# Patient Record
Sex: FEMALE | Race: WHITE | NOT HISPANIC OR LATINO | Employment: FULL TIME | URBAN - METROPOLITAN AREA
[De-identification: names, ages, dates, MRNs, and addresses within clinical notes are randomized per-mention and may not be internally consistent; named-entity substitution may affect disease eponyms.]

---

## 2018-09-28 ENCOUNTER — HOSPITAL ENCOUNTER (EMERGENCY)
Facility: HOSPITAL | Age: 63
Discharge: HOME | End: 2018-09-28
Attending: FAMILY MEDICINE
Payer: OTHER MISCELLANEOUS

## 2018-09-28 VITALS
OXYGEN SATURATION: 98 % | HEIGHT: 65 IN | HEART RATE: 90 BPM | BODY MASS INDEX: 31.32 KG/M2 | TEMPERATURE: 98 F | SYSTOLIC BLOOD PRESSURE: 158 MMHG | WEIGHT: 188 LBS | RESPIRATION RATE: 20 BRPM | DIASTOLIC BLOOD PRESSURE: 88 MMHG
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
## 2018-09-28 PROCEDURE — 70450 CT HEAD/BRAIN W/O DYE: CPT | Mod: TC

## 2018-09-28 PROCEDURE — 70450 CT HEAD/BRAIN W/O DYE: CPT | Mod: 26,,, | Performed by: RADIOLOGY

## 2018-09-28 PROCEDURE — 73590 X-RAY EXAM OF LOWER LEG: CPT | Mod: 26,LT,, | Performed by: RADIOLOGY

## 2018-09-28 PROCEDURE — 70486 CT MAXILLOFACIAL W/O DYE: CPT | Mod: TC

## 2018-09-28 PROCEDURE — 73590 X-RAY EXAM OF LOWER LEG: CPT | Mod: TC,FY,LT

## 2018-09-28 PROCEDURE — 99284 EMERGENCY DEPT VISIT MOD MDM: CPT | Mod: 25

## 2018-09-28 PROCEDURE — 70486 CT MAXILLOFACIAL W/O DYE: CPT | Mod: 26,,, | Performed by: RADIOLOGY

## 2018-09-28 RX ORDER — AMLODIPINE BESYLATE 5 MG/1
5 TABLET ORAL DAILY
COMMUNITY
End: 2020-10-23 | Stop reason: SDUPTHER

## 2018-09-28 RX ORDER — LOSARTAN POTASSIUM 50 MG/1
50 TABLET ORAL DAILY
COMMUNITY
End: 2020-10-23 | Stop reason: SDUPTHER

## 2018-09-28 RX ORDER — ATORVASTATIN CALCIUM 40 MG/1
40 TABLET, FILM COATED ORAL DAILY
COMMUNITY
End: 2020-10-23 | Stop reason: SDUPTHER

## 2018-09-29 NOTE — DISCHARGE INSTRUCTIONS
Home to rest, elevate extremities.  Return to ER if condition changes or worsens.  Ibuprofen as needed for pain or inflammation.  May use ice to sore areas for the 1st 24 hr then switch to heat as needed.  Follow up with the primary care provider in 2-3 days if no improvement in symptoms.

## 2018-09-29 NOTE — ED PROVIDER NOTES
Encounter Date: 2018       History     Chief Complaint   Patient presents with    Fall     fell at hotel in [facility] this AM. Both knees hurts and face     Pt c/o bilateral knee pain and facial pain after falling from a standing position.  Pt denies LOC          Review of patient's allergies indicates:  No Known Allergies  Past Medical History:   Diagnosis Date    Hyperlipidemia     Hypertension      Past Surgical History:   Procedure Laterality Date     SECTION       History reviewed. No pertinent family history.  Social History     Tobacco Use    Smoking status: Never Smoker   Substance Use Topics    Alcohol use: Yes     Comment: occ    Drug use: No     Review of Systems   Musculoskeletal:        Bilateral knee pain, facial pain   All other systems reviewed and are negative.      Physical Exam     Initial Vitals [18]   BP Pulse Resp Temp SpO2   (!) 158/88 90 20 98.3 °F (36.8 °C) 98 %      MAP       --         Physical Exam    Nursing note and vitals reviewed.  Constitutional: She appears well-developed and well-nourished.   HENT:   Head: Normocephalic.   Eyes: Conjunctivae and EOM are normal. Pupils are equal, round, and reactive to light.   Neck: Normal range of motion. Neck supple.   Cardiovascular: Normal rate.   Pulmonary/Chest: Breath sounds normal.   Abdominal: Soft. Bowel sounds are normal.   Musculoskeletal: Normal range of motion. She exhibits tenderness.   Tenderness noted to bilateral knees, chin and nose.  No echymosis or swelling noted on exam   Neurological: She is alert. She has normal strength and normal reflexes.   Skin: Skin is warm and dry. Capillary refill takes 2 to 3 seconds.   Small abrasion noted below the left knee   Psychiatric: She has a normal mood and affect. Her behavior is normal. Judgment and thought content normal.         ED Course   Procedures  Labs Reviewed - No data to display       Imaging Results          CT Head Without Contrast (In  process)                X-Ray Tibia Fibula 2 View Left (In process)                                    ED Course as of Sep 28 2127   Fri Sep 28, 2018   2121 CT head and facial bones normal read per radiology.   [MD]   2125 Pt declines need for pain meds. Results d/w her and significant other. All questions answered.   [MD]      ED Course User Index  [MD] Catia Fritz MD     Clinical Impression:   The encounter diagnosis was Fall.                             Catia Fritz MD  09/28/18 2127

## 2019-01-08 DIAGNOSIS — M25.561 PAIN IN BOTH KNEES, UNSPECIFIED CHRONICITY: Primary | ICD-10-CM

## 2019-01-08 DIAGNOSIS — M25.562 PAIN IN BOTH KNEES, UNSPECIFIED CHRONICITY: Primary | ICD-10-CM

## 2019-01-11 ENCOUNTER — HOSPITAL ENCOUNTER (OUTPATIENT)
Dept: RADIOLOGY | Facility: HOSPITAL | Age: 64
Discharge: HOME | End: 2019-01-11
Attending: ORTHOPAEDIC SURGERY
Payer: OTHER MISCELLANEOUS

## 2019-01-11 ENCOUNTER — OFFICE VISIT (OUTPATIENT)
Dept: ORTHOPEDICS | Facility: CLINIC | Age: 64
End: 2019-01-11
Payer: OTHER MISCELLANEOUS

## 2019-01-11 VITALS
WEIGHT: 188.06 LBS | HEART RATE: 89 BPM | SYSTOLIC BLOOD PRESSURE: 126 MMHG | DIASTOLIC BLOOD PRESSURE: 83 MMHG | HEIGHT: 65 IN | BODY MASS INDEX: 31.33 KG/M2

## 2019-01-11 DIAGNOSIS — M70.51 PES ANSERINUS BURSITIS OF BOTH KNEES: ICD-10-CM

## 2019-01-11 DIAGNOSIS — M22.2X2 PATELLOFEMORAL PAIN SYNDROME OF BOTH KNEES: ICD-10-CM

## 2019-01-11 DIAGNOSIS — M70.52 PES ANSERINUS BURSITIS OF BOTH KNEES: ICD-10-CM

## 2019-01-11 DIAGNOSIS — M25.561 PAIN IN BOTH KNEES, UNSPECIFIED CHRONICITY: ICD-10-CM

## 2019-01-11 DIAGNOSIS — M71.21 BAKER CYST, RIGHT: ICD-10-CM

## 2019-01-11 DIAGNOSIS — M22.2X1 PATELLOFEMORAL PAIN SYNDROME OF BOTH KNEES: ICD-10-CM

## 2019-01-11 DIAGNOSIS — M25.562 PAIN IN BOTH KNEES, UNSPECIFIED CHRONICITY: ICD-10-CM

## 2019-01-11 DIAGNOSIS — S83.242A ACUTE MEDIAL MENISCUS TEAR, LEFT, INITIAL ENCOUNTER: ICD-10-CM

## 2019-01-11 DIAGNOSIS — S83.419A SPRAIN OF MEDIAL COLLATERAL LIGAMENT OF KNEE, UNSPECIFIED LATERALITY, INITIAL ENCOUNTER: ICD-10-CM

## 2019-01-11 PROCEDURE — 73562 X-RAY EXAM OF KNEE 3: CPT | Mod: 26,50,, | Performed by: RADIOLOGY

## 2019-01-11 PROCEDURE — 73562 XR KNEE 3 VIEW BILATERAL: ICD-10-PCS | Mod: 26,50,, | Performed by: RADIOLOGY

## 2019-01-11 PROCEDURE — 99999 PR PBB SHADOW E&M-EST. PATIENT-LVL III: CPT | Mod: PBBFAC,,, | Performed by: ORTHOPAEDIC SURGERY

## 2019-01-11 PROCEDURE — 73562 X-RAY EXAM OF KNEE 3: CPT | Mod: 50,TC,PN

## 2019-01-11 PROCEDURE — 99999 PR PBB SHADOW E&M-EST. PATIENT-LVL III: ICD-10-PCS | Mod: PBBFAC,,, | Performed by: ORTHOPAEDIC SURGERY

## 2019-01-11 PROCEDURE — 99204 PR OFFICE/OUTPT VISIT, NEW, LEVL IV, 45-59 MIN: ICD-10-PCS | Mod: S$GLB,,, | Performed by: ORTHOPAEDIC SURGERY

## 2019-01-11 PROCEDURE — 99204 OFFICE O/P NEW MOD 45 MIN: CPT | Mod: S$GLB,,, | Performed by: ORTHOPAEDIC SURGERY

## 2019-01-11 NOTE — PROGRESS NOTES
Subjective:      Patient ID: Keesha Quezada is a 63 y.o. female.    Chief Complaint: Pain and Injury of the Right Knee and Pain and Injury of the Left Knee    Referring Provider: No address on file    HPI:  Ms. Quezada is a 63-year-old female who presented today for evaluation of over 3 months of bilateral knee pain left greater than right which was incurred after she slipped and fell on a floor in a hotel.  She was originally seen in the emergency room at [facility] on 2018.  Going from a seated to a standing position or standing to a seated position increases her symptoms. Once she is moving her pain improves.  Her left knee feels unstable.  She has swelling and giving way but denied locking.  She has taken NSAIDs with help.  She has not worn a brace, done physical therapy, nor had injections.    Past Medical History:   Diagnosis Date    Hyperlipidemia      *  *  *  *  * Hypertension  Diabetes  Asthma  Seasonal allergies  Depression  GERD      Past Surgical History:   Procedure Laterality Date     *  *  SECTION  TUBAL LIGATION  ORAL IMPLANTS         Review of patient's allergies indicates:  No Known Allergies    Social History     Occupational History    [employer] officer for [employer]     Tobacco Use    Smoking status: Never Smoker    Smokeless tobacco: Never Used   Substance and Sexual Activity    Alcohol use: Yes     Comment: occ    Drug use: No    Sexual activity: Yes      Family History   Problem Relation Age of Onset    ALS Mother     Cancer Father     Heart disease Father     Stroke Sister     No Known Problems Brother     No Known Problems Sister     No Known Problems Brother     No Known Problems Son     No Known Problems Daughter        Previous Hospitalizations:  Childbirth.    ROS:   Review of Systems   Constitution: Negative for chills and fever.   HENT: Negative for congestion.    Eyes: Negative for blurred vision.    Cardiovascular: Negative for chest pain.   Respiratory: Negative for cough.    Endocrine: Negative for polydipsia.   Hematologic/Lymphatic: Does not bruise/bleed easily.   Skin: Negative for itching.   Musculoskeletal: Positive for joint pain. Negative for gout.   Gastrointestinal: Positive for heartburn. Negative for constipation and diarrhea.   Genitourinary: Negative for nocturia.   Neurological: Negative for headaches and seizures.   Psychiatric/Behavioral: Negative for substance abuse.   Allergic/Immunologic: Positive for environmental allergies.         Objective:      Physical Exam:   General: AAOx3.  No acute distress  HEENT: Normocephalic, PEARLA EOMI, Good Dentition  Neck: Supple, No JVD  Chest: Symetric, equal excursion on inspiration  Abdomen: Soft NTND  Vascular:  Pulses intact and equal bilaterally.  Capillary refill less than 3 seconds and equal bilaterally  Neurologic:  Pinprick and soft touch intact and equal bilaterally  Integment:  No ecchymosis, no errythema  Extremity:  Knee:  Extension/flexion equal bilaterally 0/128 degrees. Mild effusion left knee. Baker cyst right knee. Mild crepitus with motion both knees.  Mild patellar load/compression both knees.  Negative patella apprehension/relocation both knees.  Increased excursion with valgus stressing both knees left greater than right.  Valgus stressing equal bilaterally with good endpoint.  Lachman's/drawer equal bilaterally with good endpoint.  Positive joint line tenderness both knees.  Indigo positive left knee. [place] positive both knees.  Tender at the anserine insertion both knees.  Swelling at the anserine insertion both knees.  Radiography:  Personally reviewed x-rays of both knees completed on 01/11/2019 which showed patellofemoral arthritis and a fabella bilaterally no fracture or dislocation.      Assessment:       Impression:      1. Sprain of medial collateral ligament, bilateral knee, initial encounter    2. Traumatic pes  anserinus bursitis of both knees    3. Patellofemoral pain syndrome of both knees    4. Acute medial meniscus tear, left, initial encounter    5. Hoang cyst, right          Plan:       1.  Discussed physical examination and radiographic findings with the patient. Keesha understands that she has traumatic pes anserine bursitis and MCL sprain of both of her knees.  She also appears to have a meniscus tear in her left knee. Since she has not completed any conservative management recommend she trial conservative care and then if she fails conservative care then consider surgical intervention.  2.  Offered a steroid injection to both knees and both anserine insertions she declined stating that she was allergic to cortisone.  3.  Hinged meniscus brace both knees.  One was fitted to each knee.  4.  Home exercises to include quadriceps and hamstring strengthening were shown discussed.  5.  Discussed possible referral to physical therapy will hold off for now if she does not improve may consider referral at next visit.  6.  May return to work with work restrictions to include no lifting/pushing/pulling greater than 10 lb.  No work on ladders or scaffolding.  7.  Voltaren 1% gel, apply to affected area twice daily, dispense 100 g, refill 5.  8.  Mobic 15 mg, 1 p.o. q.day, dispense 30, refill 0.  9.  Follow up in approximately 6 weeks for re-evaluation.  Expect to be able to return the patient to full unrestricted activities at that time.

## 2019-01-11 NOTE — LETTER
January 11, 2019      No Recipients           [facility]  [address] [place] [address]  Phone: [phone]  Fax: [phone]          Patient: Keesha Quezada   MR Number: [id number]   YOB: 1955   Date of Visit: 1/11/2019       Dear :    Thank you for referring Keesha Quezada to me for evaluation. Attached you will find relevant portions of my assessment and plan of care.    If you have questions, please do not hesitate to call me. I look forward to following Keesha Quezada along with you.    Sincerely,    Jayme Lucas, DO    Enclosure  CC:  No Recipients    If you would like to receive this communication electronically, please contact [email or web address] or [phone] to request more information on [facility] Link access.    For providers and/or their staff who would like to refer a patient to [facility], please contact us through our one-stop-shop provider referral line, [facility], at [phone].    If you feel you have received this communication in error or would no longer like to receive these types of communications, please e-mail [email or web address]

## 2019-02-15 ENCOUNTER — OFFICE VISIT (OUTPATIENT)
Dept: ORTHOPEDICS | Facility: CLINIC | Age: 64
End: 2019-02-15
Payer: COMMERCIAL

## 2019-02-15 VITALS — BODY MASS INDEX: 31.33 KG/M2 | HEIGHT: 65 IN | WEIGHT: 188.06 LBS

## 2019-02-15 DIAGNOSIS — M70.52 PES ANSERINUS BURSITIS OF BOTH KNEES: ICD-10-CM

## 2019-02-15 DIAGNOSIS — M22.2X2 PATELLOFEMORAL PAIN SYNDROME OF BOTH KNEES: ICD-10-CM

## 2019-02-15 DIAGNOSIS — M25.562 PATELLOFEMORAL JOINT PAIN, LEFT: ICD-10-CM

## 2019-02-15 DIAGNOSIS — S83.412D SPRAIN OF MEDIAL COLLATERAL LIGAMENT OF LEFT KNEE, SUBSEQUENT ENCOUNTER: Primary | ICD-10-CM

## 2019-02-15 DIAGNOSIS — M22.2X1 PATELLOFEMORAL PAIN SYNDROME OF BOTH KNEES: ICD-10-CM

## 2019-02-15 DIAGNOSIS — S83.419A SPRAIN OF MEDIAL COLLATERAL LIGAMENT OF KNEE, UNSPECIFIED LATERALITY, INITIAL ENCOUNTER: ICD-10-CM

## 2019-02-15 DIAGNOSIS — M71.21 BAKER CYST, RIGHT: ICD-10-CM

## 2019-02-15 DIAGNOSIS — M70.51 PES ANSERINUS BURSITIS OF BOTH KNEES: ICD-10-CM

## 2019-02-15 DIAGNOSIS — M17.11 PRIMARY OSTEOARTHRITIS OF RIGHT KNEE: Primary | ICD-10-CM

## 2019-02-15 DIAGNOSIS — M17.12 PRIMARY OSTEOARTHRITIS OF LEFT KNEE: ICD-10-CM

## 2019-02-15 DIAGNOSIS — M71.21 BAKER'S CYST OF KNEE, RIGHT: ICD-10-CM

## 2019-02-15 DIAGNOSIS — S83.242D ACUTE MEDIAL MENISCUS TEAR, LEFT, SUBSEQUENT ENCOUNTER: ICD-10-CM

## 2019-02-15 DIAGNOSIS — S83.419A SPRAIN OF MEDIAL COLLATERAL LIGAMENT OF KNEE, UNSPECIFIED LATERALITY, INITIAL ENCOUNTER: Primary | ICD-10-CM

## 2019-02-15 PROCEDURE — 99999 PR PBB SHADOW E&M-EST. PATIENT-LVL II: ICD-10-PCS | Mod: PBBFAC,,, | Performed by: ORTHOPAEDIC SURGERY

## 2019-02-15 PROCEDURE — 99999 PR PBB SHADOW E&M-EST. PATIENT-LVL II: CPT | Mod: PBBFAC,,, | Performed by: ORTHOPAEDIC SURGERY

## 2019-02-15 PROCEDURE — 99213 OFFICE O/P EST LOW 20 MIN: CPT | Mod: S$GLB,,, | Performed by: ORTHOPAEDIC SURGERY

## 2019-02-15 PROCEDURE — 99213 PR OFFICE/OUTPT VISIT, EST, LEVL III, 20-29 MIN: ICD-10-PCS | Mod: S$GLB,,, | Performed by: ORTHOPAEDIC SURGERY

## 2019-02-15 NOTE — PROGRESS NOTES
Subjective:      Patient ID: Keesha Quezada is a 63 y.o. female.    Chief Complaint: Pain and Injury of the Left Knee and Pain and Injury of the Right Knee    HPI:  Ms. Quezada returns today to re-evaluate both of her knees.  She stated her right knee is doing well, but her left knee is still painful.  She has been wearing the braces but states that they are somewhat loose and fall down her legs.  The patient was shown how to adjust her braces.  She also stated that she has been driving in her braces and they give her problems when she drives.  She stated that her place of employment requires her to wear then when she is driving.  Several days ago when she was driving for several hours she developed some heel tenderness.    ROS:  No new diagnosis/ surgery/ prescriptions since last office visit on  01/11/2019.  Constitution: Negative for chills and fever.   HENT: Negative for congestion.    Eyes: Negative for blurred vision.   Cardiovascular: Negative for chest pain.   Respiratory: Negative for cough.    Endocrine: Negative for polydipsia.   Hematologic/Lymphatic: Does not bruise/bleed easily.   Skin: Negative for itching.   Musculoskeletal: Positive for joint pain. Negative for gout.   Gastrointestinal: Positive for heartburn. Negative for constipation and diarrhea.   Genitourinary: Negative for nocturia.   Neurological: Negative for headaches and seizures.   Psychiatric/Behavioral: Negative for substance abuse.   Allergic/Immunologic: Positive for environmental allergies.       Objective:      Physical Exam:   General: AAOx3.  No acute distress  Vascular:  Pulses intact and equal bilaterally.  Capillary refill less than 3 seconds and equal bilaterally  Neurologic:  Pinprick and soft touch intact and equal bilaterally  Integment:  No ecchymosis, no errythema  Extremity:   Knee:  Extension/flexion equal bilaterally 0/128 degrees. Mild effusion left knee. Baker cyst right knee. Mild crepitus with motion both knees.   Mild patellar load/compression both knees.  Negative patella apprehension/relocation both knees.  Increased excursion with valgus stressing both knees left greater than right.  Valgus stressing equal bilaterally with good endpoint.  Lachman's/drawer equal bilaterally with good endpoint.  Positive joint line tenderness  Left knee.  Indigo positive left knee. [place] positive left knee,  Tender at the anserine insertion left knee.  Swelling at the anserine insertion left knee.  Radiography:    No new x-rays done today put        Assessment:       Impression:   1. MCL sprain left knee  2.  Resolved MCL sprain right knee.    3. Patellofemoral pain left knee.    4.Medial meniscus tear left knee.  5.  Resolved patellofemoral pain right knee.    6.  Baker cyst right knee.      Plan:       1.  Discussed physical examination with the patient.  She understands it is advantageous that she has resolved her right knee pain, but since her left knee pain has not resolved she needs to trial other modalities.  She understands she should continue with her knee braces but she does not need to wear when she is driving or seated.  Only when she is ambulating or exercises.  Since she cannot have steroid injections she can be referred for physical therapy she also may want to consider viscosupplementation.  Recommend she trial viscosupplementation to her knees since she has an allergy to cortisone.  She should continue her current home exercise program.  2. Voltaren 1% gel, apply to affected area twice daily, dispense 100 g, refill 5.  3.  Mobic 15 mg, 1 p.o. Q.day, dispense 30, refill 5.  4.  Referred to physical therapy.  5.  Continue with home exercises previously shown discussed.  6.  Continue with knee braces when ambulating or exercising, do not need to wear them when driving.  7.  Submit for preauthorization for viscosupplementation to her knees.  8. [facility] portal use was discussed with the patient and information was given.   The patient was encouraged to use the [facility] portal for all future encounters.    9.. Follow up in approximately 6 weeks.  Will get a x-ray of her left heel if she is still symptomatic at her next follow-up.

## 2019-03-07 ENCOUNTER — CLINICAL SUPPORT (OUTPATIENT)
Dept: REHABILITATION | Facility: HOSPITAL | Age: 64
End: 2019-03-07
Attending: ORTHOPAEDIC SURGERY
Payer: COMMERCIAL

## 2019-03-07 DIAGNOSIS — S83.242A ACUTE MEDIAL MENISCUS TEAR, LEFT, INITIAL ENCOUNTER: ICD-10-CM

## 2019-03-07 DIAGNOSIS — M22.2X1 PATELLOFEMORAL PAIN SYNDROME OF BOTH KNEES: Primary | ICD-10-CM

## 2019-03-07 DIAGNOSIS — M22.2X2 PATELLOFEMORAL PAIN SYNDROME OF BOTH KNEES: Primary | ICD-10-CM

## 2019-03-07 PROCEDURE — 97161 PT EVAL LOW COMPLEX 20 MIN: CPT | Mod: PN
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
## 2019-03-25 DIAGNOSIS — M79.672 LEFT FOOT PAIN: Primary | ICD-10-CM

## 2019-03-29 ENCOUNTER — OFFICE VISIT (OUTPATIENT)
Dept: ORTHOPEDICS | Facility: CLINIC | Age: 64
End: 2019-03-29
Payer: COMMERCIAL

## 2019-03-29 ENCOUNTER — HOSPITAL ENCOUNTER (OUTPATIENT)
Dept: RADIOLOGY | Facility: HOSPITAL | Age: 64
Discharge: HOME | End: 2019-03-29
Attending: ORTHOPAEDIC SURGERY
Payer: COMMERCIAL

## 2019-03-29 VITALS — BODY MASS INDEX: 31.33 KG/M2 | WEIGHT: 188.06 LBS | HEIGHT: 65 IN

## 2019-03-29 DIAGNOSIS — M76.62 TENDONITIS, ACHILLES, LEFT: ICD-10-CM

## 2019-03-29 DIAGNOSIS — M23.52 CHRONIC INSTABILITY OF LEFT KNEE: ICD-10-CM

## 2019-03-29 DIAGNOSIS — M71.20 BAKER CYST, UNSPECIFIED LATERALITY: ICD-10-CM

## 2019-03-29 DIAGNOSIS — S83.242D ACUTE TEAR MEDIAL MENISCUS, LEFT, SUBSEQUENT ENCOUNTER: Primary | ICD-10-CM

## 2019-03-29 DIAGNOSIS — M79.672 LEFT FOOT PAIN: ICD-10-CM

## 2019-03-29 DIAGNOSIS — S83.412D SPRAIN OF MEDIAL COLLATERAL LIGAMENT OF LEFT KNEE, SUBSEQUENT ENCOUNTER: ICD-10-CM

## 2019-03-29 DIAGNOSIS — M17.12 PRIMARY OSTEOARTHRITIS OF LEFT KNEE: Primary | ICD-10-CM

## 2019-03-29 PROCEDURE — 73630 X-RAY EXAM OF FOOT: CPT | Mod: 26,LT,, | Performed by: RADIOLOGY

## 2019-03-29 PROCEDURE — 73630 XR FOOT COMPLETE 3 VIEW LEFT: ICD-10-PCS | Mod: 26,LT,, | Performed by: RADIOLOGY

## 2019-03-29 PROCEDURE — 99999 PR PBB SHADOW E&M-EST. PATIENT-LVL II: ICD-10-PCS | Mod: PBBFAC,,, | Performed by: ORTHOPAEDIC SURGERY

## 2019-03-29 PROCEDURE — 99214 OFFICE O/P EST MOD 30 MIN: CPT | Mod: S$GLB,,, | Performed by: ORTHOPAEDIC SURGERY

## 2019-03-29 PROCEDURE — 99999 PR PBB SHADOW E&M-EST. PATIENT-LVL II: CPT | Mod: PBBFAC,,, | Performed by: ORTHOPAEDIC SURGERY

## 2019-03-29 PROCEDURE — 99214 PR OFFICE/OUTPT VISIT, EST, LEVL IV, 30-39 MIN: ICD-10-PCS | Mod: S$GLB,,, | Performed by: ORTHOPAEDIC SURGERY

## 2019-03-29 PROCEDURE — 73630 X-RAY EXAM OF FOOT: CPT | Mod: TC,PN,LT

## 2019-03-29 RX ORDER — MELOXICAM 7.5 MG/1
7.5 TABLET ORAL DAILY
COMMUNITY
End: 2020-10-23

## 2019-03-29 NOTE — PROGRESS NOTES
Subjective:      Patient ID: Keesha Quezada is a 63 y.o. female.    Chief Complaint: Pain of the Right Knee; Pain and Swelling of the Left Knee; and Pain of the Left Ankle      HPI: Ms. Quezada returns today for re-evaluation of both of her knees and pain in the posterior aspect of her left heel.  She stated that her right knee is now asymptomatic.  Her left knee is still giving her issues although it has improved is still causes her pain. She began physical therapy and the taped her leg which improved her symptoms especially when she was driving.  She stated that she has pain in her Achilles and some swelling at the posterolateral aspect of her left ankle. Her left knee still cause her pain with extended ambulation.  She has been wearing her braces.    ROS:  No new diagnosis/surgery/prescriptions since last office visit on 02/15/2019.  Constitution: Negative for chills and fever.   HENT: Negative for congestion.    Eyes: Negative for blurred vision.   Cardiovascular: Negative for chest pain.   Respiratory: Negative for cough.    Endocrine: Negative for polydipsia.   Hematologic/Lymphatic: Does not bruise/bleed easily.   Skin: Negative for itching.   Musculoskeletal: Positive for joint pain. Negative for gout.   Gastrointestinal: Positive for heartburn. Negative for constipation and diarrhea.   Genitourinary: Negative for nocturia.   Neurological: Negative for headaches and seizures.   Psychiatric/Behavioral: Negative for substance abuse.   Allergic/Immunologic: Positive for environmental allergies.       Objective:      Physical Exam:   General: AAOx3.  No acute distress  Vascular:  Pulses intact and equal bilaterally.  Capillary refill less than 3 seconds and equal bilaterally  Neurologic:  Pinprick and soft touch intact and equal bilaterally  Integment:  No ecchymosis, no errythema  Extremity:  Knee:  Extension/flexion equal bilaterally 0/123 degrees. Mild effusion left knee. Crepitus with motion both knees.  Baker  cyst both knees.  Negative patellar load/compression both knees.  Negative patella apprehension/relocation both knees.  Varus/valgus stressing equal bilaterally with endpoint.  Lachman's/drawer equal bilaterally with endpoint.  Mild tenderness over the MCL, left knee. Positive medial joint line tenderness left knee. Indigo negative both knees.  Joslyn mildly positive left knee. Nontender with palpation anserine insertion both knees.  No swelling at the anserine insertion both knees.                     Ankle:  Dorsiflexion/plantar flexion equal bilaterally 22/31 degrees. Patient states she has tenderness with motion left ankle. Mild swelling posterolateral left ankle.  Tender with palpation swelling left ankle post lateral swelling. Achilles tendon palpable throughout full distribution equal bilaterally.  Mild tenderness with palpation Achilles tendon left heel.  Nontender at the ATFL bilaterally. Nontender at the deltoid ligament bilaterally. Drawer with good endpoint equal bilaterally.  Radiography:  Personally reviewed x-rays of the left foot completed on 03/29/2019 which showed an inferior calcaneal heel spur no fracture dislocation.        Assessment:       Impression:   1.  Resolving MCL sprain left knee  2.  Resolved MCL sprain right knee.    3. Medial meniscus tear left knee.  4.  Resolved patellofemoral pain right knee.    5.  Baker cyst both knee.      Plan:       1.  Discussed physical examination and radiographic findings with the patient. Keesha understands that she has Achilles tendinitis of her left heel and her symptoms are resolving in her knee. Discussed with the patient she should continue with conservative management such as physical therapy.  Offered discussed with the patient that it would be advantageous for her to have viscosupplementation to her left knee to see if it will improve since she cannot use steroids.  2.  Submit for preauthorization for viscosupplementation to the left  knee.  3.  Continue with knee braces, but the patient can discontinue the right knee brace as tolerated.  She no longer needs to drive with the right knee brace in place.  4.  Continue with home exercises as previously discussed.  5.  Continue with physical therapy as ordered.  6.  Workman's comp restriction form was completed placing the patient at light duty with lifting no greater than 20 lb and no carrying greater than 10 lb.  She also can stop wearing the brace on her right knee.  7.  [facility] portal was discussed with the patient and information was given.  The patient was encouraged to use the portal for future encounters.  8..  Follow up in approximately 4-6 weeks for re-evaluation or when viscosupplementation is available for injection

## 2019-03-29 NOTE — LETTER
March 29, 2019      [facility] [place]  [address] [place] [address]  Phone: [phone]  Fax: [phone]       Patient: Keesha Quezada   YOB: 1955  Date of Visit: 03/29/2019    To Whom It May Concern:    Dionisio Quezada  was at [facility] on 03/29/2019.She may return to work on 04/01/2019. She is no longer required to wear the brace on her Right Knee. Please retain her current restrictions: no lifting of anything greater than 20 pounds. If you have any questions or concerns, or if I can be of further assistance, please do not hesitate to contact me.    Sincerely,    Rosalind Preciado MA

## 2019-04-29 ENCOUNTER — TELEPHONE (OUTPATIENT)
Dept: ORTHOPEDICS | Facility: CLINIC | Age: 64
End: 2019-04-29

## 2019-04-29 NOTE — TELEPHONE ENCOUNTER
Patient called to schedule an appointment with Dr Lucas   
Patient went to [place] office today to speak to staff while clinic was being completed in [place], [place]. Patient requested for Dr. Lucas's office to call her at phone numbers [phone] or [phone].     Returned call to patient. Appointment made and patient voiced understanding of appointment date, time, and location.   
36 yo  at 38w2d by LMP c/w first trim sonogram, CHTN on labetalol 100mg BID, with severe BPs, r/o superimposed preeclampsia, decreased fetal movement, breech presentation, h/o C/S x 2, for repeat C/S (#3)    - Admit to L&D  - Admission labs  - IV fluids  - Cross for 2U PRBC  - Cont toco and efm  - NPO  - Pain management PRN  - PELs  - VS [id number]      Dr. Batista aware

## 2019-04-29 NOTE — TELEPHONE ENCOUNTER
Patient called to schedule an appointment with Dr Lucas patient became upset because she said she was waiting on a call from the office and she didn't receive a call. Patient said she will call somewhere else.

## 2019-05-13 ENCOUNTER — OFFICE VISIT (OUTPATIENT)
Dept: ORTHOPEDICS | Facility: CLINIC | Age: 64
End: 2019-05-13
Payer: COMMERCIAL

## 2019-05-13 VITALS — WEIGHT: 188.06 LBS | BODY MASS INDEX: 31.33 KG/M2 | HEIGHT: 65 IN

## 2019-05-13 DIAGNOSIS — M70.52 PES ANSERINUS BURSITIS OF BOTH KNEES: ICD-10-CM

## 2019-05-13 DIAGNOSIS — M71.20 BAKER CYST, UNSPECIFIED LATERALITY: ICD-10-CM

## 2019-05-13 DIAGNOSIS — S83.242D ACUTE MEDIAL MENISCUS TEAR, LEFT, SUBSEQUENT ENCOUNTER: ICD-10-CM

## 2019-05-13 DIAGNOSIS — M67.972 ACHILLES TENDON DISORDER, LEFT: ICD-10-CM

## 2019-05-13 DIAGNOSIS — M22.42 CHONDROMALACIA OF LEFT PATELLOFEMORAL JOINT: ICD-10-CM

## 2019-05-13 DIAGNOSIS — M17.12 PRIMARY OSTEOARTHRITIS OF LEFT KNEE: ICD-10-CM

## 2019-05-13 DIAGNOSIS — M23.52 CHRONIC INSTABILITY OF LEFT KNEE: ICD-10-CM

## 2019-05-13 DIAGNOSIS — S83.412A SPRAIN OF MEDIAL COLLATERAL LIGAMENT OF LEFT KNEE, INITIAL ENCOUNTER: Primary | ICD-10-CM

## 2019-05-13 DIAGNOSIS — S83.242A ACUTE MEDIAL MENISCAL TEAR, LEFT, INITIAL ENCOUNTER: ICD-10-CM

## 2019-05-13 DIAGNOSIS — M70.51 PES ANSERINUS BURSITIS OF BOTH KNEES: ICD-10-CM

## 2019-05-13 DIAGNOSIS — M71.21 BAKER CYST, RIGHT: ICD-10-CM

## 2019-05-13 DIAGNOSIS — M76.62 ACHILLES TENDINITIS OF LEFT LOWER EXTREMITY: Primary | ICD-10-CM

## 2019-05-13 PROCEDURE — 99213 PR OFFICE/OUTPT VISIT, EST, LEVL III, 20-29 MIN: ICD-10-PCS | Mod: 25,S$GLB,, | Performed by: ORTHOPAEDIC SURGERY

## 2019-05-13 PROCEDURE — 20610 LARGE JOINT ASPIRATION/INJECTION: L KNEE: ICD-10-PCS | Mod: LT,S$GLB,, | Performed by: ORTHOPAEDIC SURGERY

## 2019-05-13 PROCEDURE — 20610 DRAIN/INJ JOINT/BURSA W/O US: CPT | Mod: LT,S$GLB,, | Performed by: ORTHOPAEDIC SURGERY

## 2019-05-13 PROCEDURE — 99999 PR PBB SHADOW E&M-EST. PATIENT-LVL II: ICD-10-PCS | Mod: PBBFAC,,, | Performed by: ORTHOPAEDIC SURGERY

## 2019-05-13 PROCEDURE — 99213 OFFICE O/P EST LOW 20 MIN: CPT | Mod: 25,S$GLB,, | Performed by: ORTHOPAEDIC SURGERY

## 2019-05-13 PROCEDURE — 99999 PR PBB SHADOW E&M-EST. PATIENT-LVL II: CPT | Mod: PBBFAC,,, | Performed by: ORTHOPAEDIC SURGERY

## 2019-05-13 RX ORDER — DICLOFENAC SODIUM 10 MG/G
GEL TOPICAL
Refills: 5 | COMMUNITY
Start: 2019-03-14

## 2019-05-13 NOTE — PROGRESS NOTES
Subjective:      Patient ID: Keesha Quezada is a 63 y.o. female.    Chief Complaint: Injections (Synvisc-One Left Knee)      HPI: Ms. Quezada returned today with complaints of persistent pain in her left heel and to proceed with the Synvisc One injection to her left knee. She has been wearing a brace on her knee but it will not stay up she states she needs a new brace as it helps her immensely but she is having problems keeping it on her knee. She is also having heel pain which is not improving she is notice a little swelling in her heel.  She is not wearing a brace on her heel.    ROS:  No new diagnosis/surgery/prescriptions since last office visit on 03/29/2019.  Constitution: Negative for chills and fever.   HENT: Negative for congestion.    Eyes: Negative for blurred vision.   Cardiovascular: Negative for chest pain.   Respiratory: Negative for cough.    Endocrine: Negative for polydipsia.   Hematologic/Lymphatic: Does not bruise/bleed easily.   Skin: Negative for itching.   Musculoskeletal: Positive for joint pain. Negative for gout.   Gastrointestinal: Positive for heartburn. Negative for constipation and diarrhea.   Genitourinary: Negative for nocturia.   Neurological: Negative for headaches and seizures.   Psychiatric/Behavioral: Negative for substance abuse.   Allergic/Immunologic: Positive for environmental allergies.       Objective:      Physical Exam:   General: AAOx3.  No acute distress  Vascular:  Pulses intact and equal bilaterally.  Capillary refill less than 3 seconds and equal bilaterally  Neurologic:  Pinprick and soft touch intact and equal bilaterally  Integment:  No ecchymosis, no errythema  Extremity: Knee:  Extension/flexion equal bilaterally 0/123 degrees. Mild effusion left knee. Crepitus with motion both knees.  Baker cyst both knees.  Negative patellar load/compression both knees.  Negative patella apprehension/relocation both knees.  Varus/valgus stressing equal bilaterally with endpoint.   Lachman's/drawer equal bilaterally with endpoint. Positive medial joint line tenderness left knee. Indigo negative both knees.  Joslyn mildly positive left knee. Nontender with palpation anserine insertion both knees.  No swelling at the anserine insertion both knees.                     Ankle:  Dorsiflexion/plantar flexion equal bilaterally 22/31 degrees. Patient states she has tenderness with motion left ankle. Mild swelling posterolateral left ankle.  Tender with palpation swelling left ankle post lateral swelling. Achilles tendon palpable throughout full distribution equal bilaterally.  Mild tenderness with palpation Achilles tendon left heel.  Nontender at the ATFL bilaterally. Nontender at the deltoid ligament bilaterally. Drawer with good endpoint equal bilaterally. Mild swelling at the Achilles tendon left foot.  Radiography:  No new x-rays done today.      Assessment:       Impression:   1.  Achilles tendinitis, left heel.  2.  Resolving medial meniscus tear, left knee.  3.  Patellofemoral chondromalacia, left knee.  4.  Baker cyst bilateral knees.      Plan:       1.  Discussed physical examination with the patient. Keesha understands that she still has meniscus issues and patellofemoral issues.  She also has Achilles tendinitis in order to help try to resolve some of her Achilles tendinitis she can be placed in a Cam walker.  She should also continue wearing her brace on her knee, but will arrange for her to get a new brace which hopefully fit better.  She should also do physical therapy on both her knee and her heel.  2.  Offered Synvisc One injection to the left knee, she elected to proceed..  3.  Hinged meniscus brace left knee the patient was given referral to have 1 fitted to her.  4.  Refer to physical therapy for knee and he will treatment.  5.  Patient's workman's comp form was completed putting her at sedentary work abilities.  6.  Home exercises were reinforced.  7.  Take NSAIDs as tolerated  allowed by PCM.  8.  [facility] portal was discussed with the patient and information was given.  The patient was encouraged to use the portal for future encounters.  9.  Follow up 6 weeks.

## 2019-05-13 NOTE — PROCEDURES
Large Joint Aspiration/Injection: L knee  Date/Time: 5/13/2019 8:51 AM  Performed by: Jayme Lucas DO  Authorized by: Jayme Lucas, DO     Consent Done?:  Yes (Verbal)  Indications:  Pain, joint swelling and diagnostic evaluation  Procedure site marked: Yes    Timeout: Prior to procedure the correct patient, procedure, and site was verified      Location:  Knee  Site:  L knee  Prep: Patient was prepped and draped in usual sterile fashion    Ultrasonic Guidance for needle placement: No  Needle size:  22 G  Approach:  Anterolateral  Medications:  48 mg hylan g-f 20 48 mg/6 mL  Patient tolerance:  Patient tolerated the procedure well with no immediate complications

## 2019-05-28 ENCOUNTER — CLINICAL SUPPORT (OUTPATIENT)
Dept: REHABILITATION | Facility: HOSPITAL | Age: 64
End: 2019-05-28
Attending: ORTHOPAEDIC SURGERY
Payer: COMMERCIAL

## 2019-05-28 DIAGNOSIS — M22.2X2 PATELLOFEMORAL PAIN SYNDROME OF BOTH KNEES: Primary | ICD-10-CM

## 2019-05-28 DIAGNOSIS — M22.2X1 PATELLOFEMORAL PAIN SYNDROME OF BOTH KNEES: Primary | ICD-10-CM

## 2019-05-28 PROCEDURE — 97110 THERAPEUTIC EXERCISES: CPT | Mod: PN

## 2019-05-28 NOTE — PROGRESS NOTES
Physical Therapy Daily Note     Name: Keesha Quezada  Clinic Number: [id number]  Diagnosis:   Encounter Diagnosis   Name Primary?    Patellofemoral pain syndrome of both knees Yes     Physician: Jayme Lucas,   Precautions: Standard  Visit #: 2 of 12  PTA Visit #: 1  Time In: 8:00  Time Out: 9:10    Subjective     Pt reports: Saw MD two weeks ago, issued walking boot for left foot, injected left knee with non-steroidal anti-inflammatory  Pain Scale: Keesha rates pain on a scale of 0-10 to be 8 currently.    Objective     Keesha received individual therapeutic exercises to develop strength, endurance and ROM for 45 minutes including:  Nustep level 3 x 15 mins  Toe Taps x 2 mins  Side Steps x 2 mins  Standing Hip Ext/Abd x 15 with Red TB  Achilles Stretch on wedge 3 x 1 min  HS Curls x 20 with blue TB  Bridges x 15  Ball Squeezes x 3 mins  QS x 3 mins  SLR 10 x 2  Hip Abd 10 x 2       Keesha received the following manual therapy techniques:  were applied to the:  for  minutes including:       The patient received the following direct contact modalities after being cleared for contraindications:     The patient received the following supervised modalities after being cleared for contradictions:     Written Home Exercises Provided:   Pt demo good understanding of the education provided. Keesha demonstrated good return demonstration of activities.     Education provided re:  Keesha verbalized good understanding of education provided.   No spiritual or educational barriers to learning provided    Assessment     Patient tolerated exercises well; no increased symptoms noted; progress as tolerated to improve strength and mobility and decrease pain  This is a 63 y.o. female referred to outpatient physical therapy and presents with a medical diagnosis of bilateral knee pain, left knee meniscal injury due to fall and demonstrates limitations as described in the problem list. Pt  prognosis is Good. Pt will continue to benefit from skilled outpatient physical therapy to address the deficits listed in the problem list, provide pt/family education and to maximize pt's level of independence in the home and community environment.     LONG TERM GOALS:  In 6 weeks, pt. Will:     1. Be independent with HEP and SX management   2. Demonstrate Full painfree AROM in bilateral knees and ankles  3. Demonstrate 5/5 strength in BLE's  4. Demonstrate normal gait pattern at community distances as well as up and down steps  5. Subjective pain no more than 2/10 in BLE's with return to full activity  6. LEFS improved from 32% limitation to <10% limitation.     Plan     Continue with established Plan of Care towards PT goals.    Therapist: Jonathan Favre, PTA  5/28/2019

## 2019-05-31 ENCOUNTER — CLINICAL SUPPORT (OUTPATIENT)
Dept: REHABILITATION | Facility: HOSPITAL | Age: 64
End: 2019-05-31
Attending: ORTHOPAEDIC SURGERY
Payer: COMMERCIAL

## 2019-05-31 DIAGNOSIS — M22.2X1 PATELLOFEMORAL PAIN SYNDROME OF BOTH KNEES: Primary | ICD-10-CM

## 2019-05-31 DIAGNOSIS — M22.2X2 PATELLOFEMORAL PAIN SYNDROME OF BOTH KNEES: Primary | ICD-10-CM

## 2019-05-31 PROCEDURE — 97110 THERAPEUTIC EXERCISES: CPT | Mod: PN

## 2019-05-31 PROCEDURE — 97140 MANUAL THERAPY 1/> REGIONS: CPT | Mod: PN

## 2019-05-31 NOTE — PROGRESS NOTES
Physical Therapy Daily Note     Name: Keesha Quezada  Clinic Number: [id number]  Diagnosis:   Encounter Diagnosis   Name Primary?    Patellofemoral pain syndrome of both knees Yes     Physician: Jayme Lucas,   Precautions: Standard  Visit #: 3 of 12  PTA Visit #: 2  Time In: 8:00 AM  Time Out: 9:20 AM    Subjective     Pt reports: Increased pain in left achilles today  Pain Scale: Keesha rates pain on a scale of 0-10 to be 8 left knee; left achilles 8 currently.    Objective     Keesha received individual therapeutic exercises to develop strength, endurance and ROM for 45 minutes including:  Nustep level 3 x 15 mins  Toe Taps x 2 mins  Side Steps x 2 mins  Standing Hip Ext/Abd x 15 with Red TB  Achilles Stretch on wedge 3 x 1 min  HS Curls x 20 with blue TB  Bridges x 15  Ball Squeezes x 3 mins  QS x 3 mins  SLR 10 x 2  Hip Abd 10 x 2       Keesha received the following manual therapy techniques: STM x 10 mins to left achilles; K Tape to left achilles       The patient received the following direct contact modalities after being cleared for contraindications:     The patient received the following supervised modalities after being cleared for contradictions:     Written Home Exercises Provided:   Pt demo good understanding of the education provided. Keesha demonstrated good return demonstration of activities.     Education provided re:  Keesha verbalized good understanding of education provided.   No spiritual or educational barriers to learning provided    Assessment     Patient tolerated exercises well; no increased symptoms noted; progress as tolerated to improve strength and mobility and decrease pain  This is a 63 y.o. female referred to outpatient physical therapy and presents with a medical diagnosis of bilateral knee pain, left knee meniscal injury due to fall and demonstrates limitations as described in the problem list. Pt prognosis is Good. Pt will continue  to benefit from skilled outpatient physical therapy to address the deficits listed in the problem list, provide pt/family education and to maximize pt's level of independence in the home and community environment.     LONG TERM GOALS:  In 6 weeks, pt. Will:     1. Be independent with HEP and SX management   2. Demonstrate Full painfree AROM in bilateral knees and ankles  3. Demonstrate 5/5 strength in BLE's  4. Demonstrate normal gait pattern at community distances as well as up and down steps  5. Subjective pain no more than 2/10 in BLE's with return to full activity  6. LEFS improved from 32% limitation to <10% limitation.     Plan     Continue with established Plan of Care towards PT goals.    Therapist: Jonathan Favre, PTA  5/31/2019

## 2019-06-03 ENCOUNTER — CLINICAL SUPPORT (OUTPATIENT)
Dept: REHABILITATION | Facility: HOSPITAL | Age: 64
End: 2019-06-03
Attending: ORTHOPAEDIC SURGERY
Payer: COMMERCIAL

## 2019-06-03 DIAGNOSIS — S83.242A ACUTE MEDIAL MENISCUS TEAR, LEFT, INITIAL ENCOUNTER: ICD-10-CM

## 2019-06-03 DIAGNOSIS — M76.62 TENDONITIS, ACHILLES, LEFT: Primary | ICD-10-CM

## 2019-06-03 PROCEDURE — 97140 MANUAL THERAPY 1/> REGIONS: CPT | Mod: PN

## 2019-06-03 PROCEDURE — 97110 THERAPEUTIC EXERCISES: CPT | Mod: PN

## 2019-06-03 NOTE — PROGRESS NOTES
Keesha was initially evaluated back in March 2019.  Delay in getting patient scheduled for regular treatments due to  authorization as well as patient's schedule.  During the 2 months, Keesha has had increased pain in her left achilles - went back to see Dr. Lucas - he put her in a boot in order to rest the achilles.  Keesha finds the boot is not really that helpful; She has tried to wear a higher shoe on the Right in order to compensate for the high boot - she stated that this has made her right heel hurt.  Keesha has also received a steroid injection in her left knee - she was also given a brace - soft-hinged brace that does not fit well at all.  She told me that MD ordered a custom brace but it is not in yet.     Re-Assessment:   Left ankle dorsiflexion has actually improved from neutral to +2 since eval in March; she continues to have tenderness distall achilles at attachment on calcaneous - medial and lateral aspects; also noted some heel pain.     ROM:  - updated 5/31/2019   Knee   Right     Left   Pain/Dysfunction with Movement     AROM PROM MMT AROM PROM MMT     Flexion  135 135 4+/5 130 131 4+/5  Pain at end range flexion left   Extension    0    0 5/5  0    0  5/5 Decreased eccentric control of Left quad noted.          PROM Right Left Comment   DF: +2 degrees To Neutral only  Improved to +2 Pain with stretch- achilles at calcaneous   PF: 60 degrees 60 degrees     Eversion: 14 degrees 13 degrees     Inversion: 18 degrees 18 degrees     1st MTP Ext: WFL degrees WFL degrees     1st MTP Flex: WFL degrees WFL degrees        Strength: Bilateral Hip Strength:  5/5;                    Bilateral Ankle/foot:  5/5    RX:  STM to left achilles and plantar fascia (right and left) K-taping to Left achilles - I-strip placed from plantar surface Met Heads along plantar fascia proximally to upper calf; Lift-off pieces applied to arch; above and below calcaneous attachment of achilles.   I-Strip to Right PF with left-off to  proximal and distal PF.     Continue with Skilled Physical Therapy Intervention 2x/week - LE strengthening, Stretching of plantar fascia and achilles; eccentric achilles strengthening.

## 2019-06-03 NOTE — PROGRESS NOTES
Physical Therapy Daily Note     Name: Keesha Quezada  Clinic Number: [id number]  Diagnosis:   Encounter Diagnoses   Name Primary?    Tendonitis, Achilles, left Yes    Acute medial meniscus tear, left, initial encounter      Physician: Jayme Lucas,   Precautions: Standard  Visit #: 4 of 12  PTA Visit #: 2  Time In: 8:00 AM  Time Out: 9: 20 AM   Certification period:  5/28/2019 to 8/31/2019     Subjective     Pt reports: Increased pain in left achilles today; Keesha stated that she really tried to rest over the weekend; when she left here on Friday, she put her boot on her Left  Foot to drive to work - stated that she had a lot of pain in her foot/ankle all the way to work. Got to work and it seemed to get better thru the day. Rested over the weekend and she stated her left ankle and knee felt ok.   Pain Scale: Keesha rates pain on a scale of 0-10 to be 8 left knee; left achilles 8 currently.    Objective     Keesha received individual therapeutic exercises to develop strength, endurance and ROM for 45 minutes including:  Nustep level 3 x 15 mins  Toe Taps x 2 mins  Side Steps x 2 mins  Standing Hip Ext/Abd x 15 with Red TB  Achilles Stretch on wedge 3 x 1 min  HS Curls x 20 with blue TB  Bridges x 15  Ball Squeezes x 3 mins  QS x 3 mins  SLR 10 x 2 - 1#   Hip Abd 10 x 2 - 1#   Clams/Reverse x 15       Keesha received the following manual therapy techniques: IASTM to left achilles into calf mm - extremely tender to tissue mobilization medial aspect of gastroc/soleus noted; ankle mobilization into dorsiflexion performed; with passive stretching;   K-tape to achilles on left with 3 lift-off pieces along achilles.        The patient received the following direct contact modalities after being cleared for contraindications:     The patient received the following supervised modalities after being cleared for contradictions:     Written Home Exercises Provided:   Neil jewell  good understanding of the education provided. Keesha demonstrated good return demonstration of activities.     Education provided re:  Keesha verbalized good understanding of education provided.   No spiritual or educational barriers to learning provided    Assessment     Patient tolerated exercises well; She demonstrates fair activation of distal quads - general decline in muscle tone noted from lack of exercise; She is still wearing a knee brace on her Left knee - it fits poorly and does not really offer much support.  She stated that she was to get a custom brace, but no one has called her yet. no increased symptoms noted; progress as tolerated to improve strength and mobility and decrease pain  This is a 63 y.o. female referred to outpatient physical therapy and presents with a medical diagnosis of bilateral knee pain, left knee meniscal injury due to fall and demonstrates limitations as described in the problem list. Pt prognosis is Good. Pt will continue to benefit from skilled outpatient physical therapy to address the deficits listed in the problem list, provide pt/family education and to maximize pt's level of independence in the home and community environment.     LONG TERM GOALS: :   All long term goals continue to remain appropriate as patient did not start her therapy until 5/28/2019.  Evaluation performed on 3/7/2019.    In 6 weeks, pt. Will:     1. Be independent with HEP and SX management   2. Demonstrate Full painfree AROM in bilateral knees and ankles  3. Demonstrate 5/5 strength in BLE's  4. Demonstrate normal gait pattern at community distances as well as up and down steps  5. Subjective pain no more than 2/10 in BLE's with return to full activity  6. LEFS improved from 32% limitation to <10% limitation.     Plan     Continue with established Plan of Care towards PT goals.    Therapist: Mone Quezada, PT  6/3/2019

## 2019-06-05 ENCOUNTER — CLINICAL SUPPORT (OUTPATIENT)
Dept: REHABILITATION | Facility: HOSPITAL | Age: 64
End: 2019-06-05
Attending: ORTHOPAEDIC SURGERY
Payer: COMMERCIAL

## 2019-06-05 DIAGNOSIS — M22.2X2 PATELLOFEMORAL PAIN SYNDROME OF BOTH KNEES: Primary | ICD-10-CM

## 2019-06-05 DIAGNOSIS — M22.2X1 PATELLOFEMORAL PAIN SYNDROME OF BOTH KNEES: Primary | ICD-10-CM

## 2019-06-05 PROCEDURE — 97110 THERAPEUTIC EXERCISES: CPT | Mod: PN

## 2019-06-05 NOTE — PROGRESS NOTES
Physical Therapy Daily Note     Name: Keesha Quezada  Clinic Number: [id number]  Diagnosis:   Encounter Diagnosis   Name Primary?    Patellofemoral pain syndrome of both knees Yes     Physician: Jayme Lucas,   Precautions: Standard  Visit #: 5 of 12  PTA Visit #: 1  Time In: 7:55 AM  Time Out: 9:05 AM   Certification period:  5/28/2019 to 8/31/2019     Subjective     Pt reports: Increased left knee and achilles pain today.  Pain Scale: Keesha rates pain on a scale of 0-10 to be 8 left knee; left achilles 8 currently.    Objective     Keesha received individual therapeutic exercises to develop strength, endurance and ROM for 45 minutes including:  Nustep level 3 x 15 mins  Toe Taps x 2 mins  Side Steps x 3 mins  Standing Hip Ext/Abd x 20 with Red TB  Achilles Stretch on wedge 3 x 1 min  HS Curls x 20 with blue TB  Bridges x 15  Ball Squeezes x 3 mins  QS x 3 mins  SLR 10 x 2 - 1#   Hip Abd 10 x 2 - 1#   Clams/Reverse x 15     DNP- patient declined secondary to increased tenderness  Keesha received the following manual therapy techniques: IASTM to left achilles into calf mm - extremely tender to tissue mobilization medial aspect of gastroc/soleus noted; ankle mobilization into dorsiflexion performed; with passive stretching;   K-tape to achilles on left with 3 lift-off pieces along achilles.        The patient received the following direct contact modalities after being cleared for contraindications:     The patient received the following supervised modalities after being cleared for contradictions:     Written Home Exercises Provided:   Pt demo good understanding of the education provided. Keesha demonstrated good return demonstration of activities.     Education provided re:  Keesha verbalized good understanding of education provided.   No spiritual or educational barriers to learning provided    Assessment     Patient tolerated exercises well; She demonstrates fair  activation of distal quads - general decline in muscle tone noted from lack of exercise; She is still wearing a knee brace on her Left knee - it fits poorly and does not really offer much support.  She stated that she was to get a custom brace, but no one has called her yet. no increased symptoms noted; progress as tolerated to improve strength and mobility and decrease pain  This is a 63 y.o. female referred to outpatient physical therapy and presents with a medical diagnosis of bilateral knee pain, left knee meniscal injury due to fall and demonstrates limitations as described in the problem list. Pt prognosis is Good. Pt will continue to benefit from skilled outpatient physical therapy to address the deficits listed in the problem list, provide pt/family education and to maximize pt's level of independence in the home and community environment.     LONG TERM GOALS: :   All long term goals continue to remain appropriate as patient did not start her therapy until 5/28/2019.  Evaluation performed on 3/7/2019.    In 6 weeks, pt. Will:     1. Be independent with HEP and SX management   2. Demonstrate Full painfree AROM in bilateral knees and ankles  3. Demonstrate 5/5 strength in BLE's  4. Demonstrate normal gait pattern at community distances as well as up and down steps  5. Subjective pain no more than 2/10 in BLE's with return to full activity  6. LEFS improved from 32% limitation to <10% limitation.     Plan     Continue with established Plan of Care towards PT goals.    Therapist: Jonathan Favre, PTA  6/5/2019

## 2019-06-11 ENCOUNTER — CLINICAL SUPPORT (OUTPATIENT)
Dept: REHABILITATION | Facility: HOSPITAL | Age: 64
End: 2019-06-11
Payer: COMMERCIAL

## 2019-06-11 DIAGNOSIS — M22.2X2 PATELLOFEMORAL PAIN SYNDROME OF BOTH KNEES: Primary | ICD-10-CM

## 2019-06-11 DIAGNOSIS — M22.2X1 PATELLOFEMORAL PAIN SYNDROME OF BOTH KNEES: Primary | ICD-10-CM

## 2019-06-11 PROCEDURE — 97110 THERAPEUTIC EXERCISES: CPT | Mod: PN

## 2019-06-11 NOTE — PROGRESS NOTES
"                                                    Physical Therapy Daily Note     Name: Keesha Quezada  Clinic Number: [id number]  Diagnosis:   Encounter Diagnosis   Name Primary?    Patellofemoral pain syndrome of both knees Yes     Physician: Jayme Lucas,   Precautions: Standard  Visit #: 6 of 12  PTA Visit #: 2  Time In: 7:55 AM  Time Out: 8:55 AM   Certification period:  5/28/2019 to 8/31/2019     Subjective     Pt reports: "I am hurting today. Those four days at the [employer] really got me. Went from the tournament to the shower to [employer] parties. Only time I got to rest was when I went to sleep. Somebody is going to meet me here to measure for my knee brace."   Pain Scale: Keesha rates pain on a scale of 0-10 to be 9 left knee; left achilles 6 currently.    Objective     Keesha received individual therapeutic exercises to develop strength, endurance and ROM for 45 minutes including:  Nustep level 3 x 15 mins  Toe Taps x 2 mins  Side Steps x 3 mins  Standing Hip Ext/Abd x 20 with Red TB  Achilles Stretch on wedge 3 x 1 min  HS Curls x 30 with blue TB  Bridges x 15  Ball Squeezes x 3 mins  QS x 3 mins  SLR 10 x 2 - 1#   Hip Abd 10 x 2 - 1#   Clams/Reverse x 15     DNP- patient declined secondary to increased tenderness  Keesha received the following manual therapy techniques: IASTM to left achilles into calf mm - extremely tender to tissue mobilization medial aspect of gastroc/soleus noted; ankle mobilization into dorsiflexion performed; with passive stretching;   K-tape to achilles on left with 3 lift-off pieces along achilles.        The patient received the following direct contact modalities after being cleared for contraindications:     The patient received the following supervised modalities after being cleared for contradictions:     Written Home Exercises Provided:   Pt demo good understanding of the education provided. Keesha demonstrated good return demonstration of activities.     Education " provided re:  Keesha verbalized good understanding of education provided.   No spiritual or educational barriers to learning provided    Assessment     Patient tolerated exercises well; She demonstrates fair activation of distal quads - general decline in muscle tone noted from lack of exercise; She is still wearing a knee brace on her Left knee - it fits poorly and does not really offer much support.  She stated that she was to get a custom brace, but no one has called her yet. no increased symptoms noted; progress as tolerated to improve strength and mobility and decrease pain  This is a 63 y.o. female referred to outpatient physical therapy and presents with a medical diagnosis of bilateral knee pain, left knee meniscal injury due to fall and demonstrates limitations as described in the problem list. Pt prognosis is Good. Pt will continue to benefit from skilled outpatient physical therapy to address the deficits listed in the problem list, provide pt/family education and to maximize pt's level of independence in the home and community environment.     LONG TERM GOALS: :   All long term goals continue to remain appropriate as patient did not start her therapy until 5/28/2019.  Evaluation performed on 3/7/2019.    In 6 weeks, pt. Will:     1. Be independent with HEP and SX management   2. Demonstrate Full painfree AROM in bilateral knees and ankles  3. Demonstrate 5/5 strength in BLE's  4. Demonstrate normal gait pattern at community distances as well as up and down steps  5. Subjective pain no more than 2/10 in BLE's with return to full activity  6. LEFS improved from 32% limitation to <10% limitation.     Plan     Continue with established Plan of Care towards PT goals.    Therapist: Jonathan Favre, PTA  6/11/2019

## 2019-06-14 ENCOUNTER — CLINICAL SUPPORT (OUTPATIENT)
Dept: REHABILITATION | Facility: HOSPITAL | Age: 64
End: 2019-06-14
Payer: COMMERCIAL

## 2019-06-14 DIAGNOSIS — M22.2X2 PATELLOFEMORAL PAIN SYNDROME OF BOTH KNEES: Primary | ICD-10-CM

## 2019-06-14 DIAGNOSIS — M22.2X1 PATELLOFEMORAL PAIN SYNDROME OF BOTH KNEES: Primary | ICD-10-CM

## 2019-06-14 PROCEDURE — 97110 THERAPEUTIC EXERCISES: CPT | Mod: PN

## 2019-06-14 NOTE — PROGRESS NOTES
"                                                    Physical Therapy Daily Note     Name: Keesha Quezada  Clinic Number: [id number]  Diagnosis:   Encounter Diagnosis   Name Primary?    Patellofemoral pain syndrome of both knees Yes     Physician: Jayme Lucas,   Precautions: Standard  Visit #: 7 of 12  PTA Visit #: 3  Time In: 7:55 AM  Time Out: 9:15 AM   Certification period:  5/28/2019 to 8/31/2019     Subjective     Pt reports: "My achilles is better, but my knee is not."    Pain Scale: Keesha rates pain on a scale of 0-10 to be 8 left knee; left achilles 6 currently.    Objective     Keesha received individual therapeutic exercises to develop strength, endurance and ROM for 45 minutes including:  Nustep level 3 x 15 mins  Toe Taps x 2 mins  Side Steps x 3 mins  Standing Hip Ext/Abd x 20 with Red TB  Achilles Stretch on wedge 3 x 1 min  HS Curls x 30 with blue TB  Bridges x 20  Ball Squeezes x 3 mins  QS x 3 mins  SLR 10 x 2 - 1#   Hip Abd 10 x 2 - 1#   Clams/Reverse x 15     DNP-   Keesha received the following manual therapy techniques: IASTM to left achilles into calf mm - extremely tender to tissue mobilization medial aspect of gastroc/soleus noted; ankle mobilization into dorsiflexion performed; with passive stretching;   K-tape to achilles on left with 3 lift-off pieces along achilles.        The patient received the following direct contact modalities after being cleared for contraindications:     The patient received the following supervised modalities after being cleared for contradictions:     Written Home Exercises Provided:   Pt demo good understanding of the education provided. Keesha demonstrated good return demonstration of activities.     Education provided re:  Keesha verbalized good understanding of education provided.   No spiritual or educational barriers to learning provided    Assessment     Patient tolerated exercises well; She demonstrates fair activation of distal quads - general decline in " muscle tone noted from lack of exercise; She is still wearing a knee brace on her Left knee - it fits poorly and does not really offer much support.  She stated that she was to get a custom brace, but no one has called her yet. no increased symptoms noted; progress as tolerated to improve strength and mobility and decrease pain  This is a 63 y.o. female referred to outpatient physical therapy and presents with a medical diagnosis of bilateral knee pain, left knee meniscal injury due to fall and demonstrates limitations as described in the problem list. Pt prognosis is Good. Pt will continue to benefit from skilled outpatient physical therapy to address the deficits listed in the problem list, provide pt/family education and to maximize pt's level of independence in the home and community environment.     LONG TERM GOALS: :   All long term goals continue to remain appropriate as patient did not start her therapy until 5/28/2019.  Evaluation performed on 3/7/2019.    In 6 weeks, pt. Will:     1. Be independent with HEP and SX management   2. Demonstrate Full painfree AROM in bilateral knees and ankles  3. Demonstrate 5/5 strength in BLE's  4. Demonstrate normal gait pattern at community distances as well as up and down steps  5. Subjective pain no more than 2/10 in BLE's with return to full activity  6. LEFS improved from 32% limitation to <10% limitation.     Plan     Continue with established Plan of Care towards PT goals.    Therapist: Jonathan Favre, PTA  6/14/2019

## 2019-06-17 ENCOUNTER — CLINICAL SUPPORT (OUTPATIENT)
Dept: REHABILITATION | Facility: HOSPITAL | Age: 64
End: 2019-06-17
Payer: COMMERCIAL

## 2019-06-17 DIAGNOSIS — M22.2X2 PATELLOFEMORAL PAIN SYNDROME OF BOTH KNEES: Primary | ICD-10-CM

## 2019-06-17 DIAGNOSIS — M22.2X1 PATELLOFEMORAL PAIN SYNDROME OF BOTH KNEES: Primary | ICD-10-CM

## 2019-06-17 PROCEDURE — 97110 THERAPEUTIC EXERCISES: CPT | Mod: PN

## 2019-06-17 NOTE — PROGRESS NOTES
"                                                    Physical Therapy Daily Note     Name: Keesha Quezada  Clinic Number: [id number]  Diagnosis:   Encounter Diagnosis   Name Primary?    Patellofemoral pain syndrome of both knees Yes     Physician: Jayme Lucas,   Precautions: Standard  Visit #: 8 of 12  PTA Visit #: 4  Time In: 7:55 AM  Time Out: 9:00 AM   Certification period:  5/28/2019 to 8/31/2019     Subjective     Pt reports: "My achilles is better, but my knee is not."    Pain Scale: Keesha rates pain on a scale of 0-10 to be 4 left knee; left achilles 3 currently.    Objective     Keesha received individual therapeutic exercises to develop strength, endurance and ROM for 45 minutes including:  Recumbent Bike level 3 x 20 mins  Toe Taps x 3 mins  Side Steps x 3 mins  Standing Hip Ext/Abd x 20 with Red TB  Achilles Stretch on wedge 3 x 1 min  HS Curls x 30 with blue TB  Bridges x 20  Ball Squeezes x 3 mins  QS x 3 mins  SLR 10 x 2 - 1#   Hip Abd 10 x 2 - 1#   Clams/Reverse x 15     DNP-   Keesha received the following manual therapy techniques: IASTM to left achilles into calf mm - extremely tender to tissue mobilization medial aspect of gastroc/soleus noted; ankle mobilization into dorsiflexion performed; with passive stretching;   K-tape to achilles on left with 3 lift-off pieces along achilles.        The patient received the following direct contact modalities after being cleared for contraindications:     The patient received the following supervised modalities after being cleared for contradictions:     Written Home Exercises Provided:   Pt demo good understanding of the education provided. Keesha demonstrated good return demonstration of activities.     Education provided re:  Keesha verbalized good understanding of education provided.   No spiritual or educational barriers to learning provided    Assessment     Patient tolerated exercises well; She demonstrates fair activation of distal quads - general " decline in muscle tone noted from lack of exercise; She is still wearing a knee brace on her Left knee - it fits poorly and does not really offer much support.  She stated that she was to get a custom brace, but no one has called her yet. no increased symptoms noted; progress as tolerated to improve strength and mobility and decrease pain  This is a 63 y.o. female referred to outpatient physical therapy and presents with a medical diagnosis of bilateral knee pain, left knee meniscal injury due to fall and demonstrates limitations as described in the problem list. Pt prognosis is Good. Pt will continue to benefit from skilled outpatient physical therapy to address the deficits listed in the problem list, provide pt/family education and to maximize pt's level of independence in the home and community environment.     LONG TERM GOALS: :   All long term goals continue to remain appropriate as patient did not start her therapy until 5/28/2019.  Evaluation performed on 3/7/2019.    In 6 weeks, pt. Will:     1. Be independent with HEP and SX management   2. Demonstrate Full painfree AROM in bilateral knees and ankles  3. Demonstrate 5/5 strength in BLE's  4. Demonstrate normal gait pattern at community distances as well as up and down steps  5. Subjective pain no more than 2/10 in BLE's with return to full activity  6. LEFS improved from 32% limitation to <10% limitation.     Plan     Continue with established Plan of Care towards PT goals.    Therapist: Jonathan Favre, PTA  6/17/2019

## 2019-06-20 ENCOUNTER — CLINICAL SUPPORT (OUTPATIENT)
Dept: REHABILITATION | Facility: HOSPITAL | Age: 64
End: 2019-06-20
Payer: COMMERCIAL

## 2019-06-20 DIAGNOSIS — M22.2X2 PATELLOFEMORAL PAIN SYNDROME OF BOTH KNEES: Primary | ICD-10-CM

## 2019-06-20 DIAGNOSIS — M22.2X1 PATELLOFEMORAL PAIN SYNDROME OF BOTH KNEES: Primary | ICD-10-CM

## 2019-06-20 PROCEDURE — 97110 THERAPEUTIC EXERCISES: CPT | Mod: PN

## 2019-06-20 NOTE — PROGRESS NOTES
"                                                    Physical Therapy Daily Note     Name: Keesha Quezada  Clinic Number: [id number]  Diagnosis:   Encounter Diagnosis   Name Primary?    Patellofemoral pain syndrome of both knees Yes     Physician: Jayme Lucas,   Precautions: Standard  Visit #: 9 of 12  PTA Visit #: 5  Time In: 7:55 AM  Time Out: 9:05 AM   Certification period:  5/28/2019 to 8/31/2019     Subjective     Pt reports: "My left knee is really hurting today. It is bruised from the new brace. I am headed to [place] for the golf tournament."   Pain Scale: Keesha rates pain on a scale of 0-10 to be 6 left knee; left achilles 6 currently.    Objective     Keesha received individual therapeutic exercises to develop strength, endurance and ROM for 45 minutes including:  Recumbent Bike level 3 x 16 mins  Toe Taps x 3 mins  Side Steps x 3 mins  Standing Hip Ext/Abd x 20 with Red TB  Achilles Stretch on wedge 3 x 1 min  HS Curls x 30 with blue TB  Bridges x 20  Ball Squeezes x 3 mins  QS x 3 mins  SLR 10 x 2 - 1#   Hip Abd 10 x 2 - 1#   Clams/Reverse x 15     DNP-   Keesha received the following manual therapy techniques: IASTM to left achilles into calf mm - extremely tender to tissue mobilization medial aspect of gastroc/soleus noted; ankle mobilization into dorsiflexion performed; with passive stretching;   K-tape to achilles on left with 3 lift-off pieces along achilles.        The patient received the following direct contact modalities after being cleared for contraindications:     The patient received the following supervised modalities after being cleared for contradictions:     Written Home Exercises Provided:   Pt demo good understanding of the education provided. Keesha demonstrated good return demonstration of activities.     Education provided re:  Keesha verbalized good understanding of education provided.   No spiritual or educational barriers to learning provided    Assessment     Patient tolerated " exercises well despite reporting increased pain in left knee and achilles; She demonstrates fair activation of distal quads - general decline in muscle tone noted from lack of exercise; She is wearing her custom knee brace on her left knee;  no increased symptoms noted; progress as tolerated to improve strength and mobility and decrease pain.  This is a 63 y.o. female referred to outpatient physical therapy and presents with a medical diagnosis of bilateral knee pain, left knee meniscal injury due to fall and demonstrates limitations as described in the problem list. Pt prognosis is Good. Pt will continue to benefit from skilled outpatient physical therapy to address the deficits listed in the problem list, provide pt/family education and to maximize pt's level of independence in the home and community environment.     LONG TERM GOALS: :   All long term goals continue to remain appropriate as patient did not start her therapy until 5/28/2019.  Evaluation performed on 3/7/2019.    In 6 weeks, pt. Will:     1. Be independent with HEP and SX management   2. Demonstrate Full painfree AROM in bilateral knees and ankles  3. Demonstrate 5/5 strength in BLE's  4. Demonstrate normal gait pattern at community distances as well as up and down steps  5. Subjective pain no more than 2/10 in BLE's with return to full activity  6. LEFS improved from 32% limitation to <10% limitation.     Plan     Continue with established Plan of Care towards PT goals.    Therapist: Jonathan Favre, PTA  6/20/2019

## 2019-06-26 ENCOUNTER — CLINICAL SUPPORT (OUTPATIENT)
Dept: REHABILITATION | Facility: HOSPITAL | Age: 64
End: 2019-06-26
Payer: COMMERCIAL

## 2019-06-26 DIAGNOSIS — M71.21 BAKER CYST, RIGHT: ICD-10-CM

## 2019-06-26 DIAGNOSIS — M76.62 TENDONITIS, ACHILLES, LEFT: ICD-10-CM

## 2019-06-26 DIAGNOSIS — M22.2X2 PATELLOFEMORAL PAIN SYNDROME OF BOTH KNEES: Primary | ICD-10-CM

## 2019-06-26 DIAGNOSIS — S83.419A SPRAIN OF MEDIAL COLLATERAL LIGAMENT OF KNEE, UNSPECIFIED LATERALITY, INITIAL ENCOUNTER: ICD-10-CM

## 2019-06-26 DIAGNOSIS — M22.2X1 PATELLOFEMORAL PAIN SYNDROME OF BOTH KNEES: Primary | ICD-10-CM

## 2019-06-26 PROCEDURE — 97110 THERAPEUTIC EXERCISES: CPT | Mod: PN

## 2019-06-26 NOTE — PROGRESS NOTES
"                                                    Physical Therapy Daily Note     Name: Keesha Quezada  Clinic Number: [id number]  Diagnosis:   Encounter Diagnoses   Name Primary?    Tendonitis, Achilles, left     Baker cyst, right     Sprain of medial collateral ligament of knee, unspecified laterality, initial encounter     Patellofemoral pain syndrome of both knees Yes     Physician: Jayme Lucas, DO  Precautions: Standard  Visit #: 10  of 12  PTA Visit #: 5  Time In: 7:30 AM   Time Out: 9:05 AM   Certification period:  5/28/2019 to 8/31/2019     Subjective     Pt reports: I did that golf tournament over the weekend, that was tough. Keesha did not wear her boot for achilles or knee brace over the weekend; stated that she had to walk a good bit - put the boot back on Monday because her calf was hurting a little bit; Today she is c/o "knife stabbing" pain in her left knee - shooting from medial to lateral aspects of knee - stated that she cannot wear the custom brace - digs into her upper thigh and is very uncomfortable.   Pain Scale: Keesha rates pain on a scale of 0-10 to be 6 left knee; left achilles 6 currently.    Objective     Keesha received individual therapeutic exercises to develop strength, endurance and ROM for 45 minutes including:  Recumbent Bike level 3 x 16 mins  Toe Taps x 3 mins  Side Steps x 3 mins  Standing Hip Ext/Abd x 20 with Red TB  Achilles Stretch on wedge 3 x 1 min  HS Curls x 30 with blue TB  Bridges x 20  Ball Squeezes x 3 mins  QS x 3 mins  SLR 10 x 2 - 1#   Hip Abd 10 x 2 - 1#   Clams/Reverse x 15     DNP-   Keesha received the following manual therapy techniques: IASTM to left achilles into calf mm - extremely tender to tissue mobilization medial aspect of gastroc/soleus noted; ankle mobilization into dorsiflexion performed; with passive stretching;   K-tape to achilles on left with 3 lift-off pieces along achilles.        The patient received the following direct contact modalities " after being cleared for contraindications:     The patient received the following supervised modalities after being cleared for contradictions:     Written Home Exercises Provided:   Pt demo good understanding of the education provided. Keesha demonstrated good return demonstration of activities.     Education provided re:  Keesha verbalized good understanding of education provided.   No spiritual or educational barriers to learning provided    Assessment     Patient tolerated exercises well despite reporting increased pain in left knee and achilles; She demonstrates fair activation of distal quads - general decline in muscle tone noted from lack of exercise; She is wearing her custom knee brace on her left knee;  no increased symptoms noted; progress as tolerated to improve strength and mobility and decrease pain.  This is a 63 y.o. female referred to outpatient physical therapy and presents with a medical diagnosis of bilateral knee pain, left knee meniscal injury due to fall and demonstrates limitations as described in the problem list. Pt prognosis is Good. Pt will continue to benefit from skilled outpatient physical therapy to address the deficits listed in the problem list, provide pt/family education and to maximize pt's level of independence in the home and community environment.     LONG TERM GOALS: :   All long term goals continue to remain appropriate as patient did not start her therapy until 5/28/2019.  Evaluation performed on 3/7/2019.    In 6 weeks, pt. Will:     1. Be independent with HEP and SX management   2. Demonstrate Full painfree AROM in bilateral knees and ankles  3. Demonstrate 5/5 strength in BLE's  4. Demonstrate normal gait pattern at community distances as well as up and down steps  5. Subjective pain no more than 2/10 in BLE's with return to full activity  6. LEFS improved from 32% limitation to <10% limitation.     Plan     Continue with established Plan of Care towards PT  goals.    Therapist: Mone Quezada, PT  6/26/2019

## 2019-06-28 ENCOUNTER — OFFICE VISIT (OUTPATIENT)
Dept: ORTHOPEDICS | Facility: CLINIC | Age: 64
End: 2019-06-28
Payer: COMMERCIAL

## 2019-06-28 VITALS — WEIGHT: 188 LBS | BODY MASS INDEX: 31.32 KG/M2 | HEIGHT: 65 IN | RESPIRATION RATE: 18 BRPM

## 2019-06-28 DIAGNOSIS — M25.562 LEFT KNEE PAIN, UNSPECIFIED CHRONICITY: Primary | ICD-10-CM

## 2019-06-28 DIAGNOSIS — M71.20 BAKER CYST, UNSPECIFIED LATERALITY: ICD-10-CM

## 2019-06-28 DIAGNOSIS — M67.972 DISORDER OF LEFT ACHILLES TENDON: ICD-10-CM

## 2019-06-28 DIAGNOSIS — M94.262 CHONDROMALACIA OF KNEE, LEFT: ICD-10-CM

## 2019-06-28 DIAGNOSIS — M23.332 OTHER MENISCUS DERANGEMENTS, OTHER MEDIAL MENISCUS, LEFT KNEE: Primary | ICD-10-CM

## 2019-06-28 PROCEDURE — 99999 PR PBB SHADOW E&M-EST. PATIENT-LVL III: CPT | Mod: PBBFAC,,, | Performed by: ORTHOPAEDIC SURGERY

## 2019-06-28 PROCEDURE — 99999 PR PBB SHADOW E&M-EST. PATIENT-LVL III: ICD-10-PCS | Mod: PBBFAC,,, | Performed by: ORTHOPAEDIC SURGERY

## 2019-06-28 PROCEDURE — 99213 PR OFFICE/OUTPT VISIT, EST, LEVL III, 20-29 MIN: ICD-10-PCS | Mod: S$GLB,,, | Performed by: ORTHOPAEDIC SURGERY

## 2019-06-28 PROCEDURE — 99213 OFFICE O/P EST LOW 20 MIN: CPT | Mod: S$GLB,,, | Performed by: ORTHOPAEDIC SURGERY

## 2019-06-28 NOTE — PROGRESS NOTES
Subjective:      Patient ID: Keesha Quezada is a 63 y.o. female.    Chief Complaint: Pain of the Left Knee and Pain of the Left Foot      HPI:  Ms. Quezada returns today with complaints of persistent pain in her heel and knee. She stated she was doing well untill these past couple weeks where she had to represent golf tournaments.  She stated she has been doing a lot of driving and standing for extended periods which has exacerbated her knee and ankle pain. She has been wearing a Cam walker.  She has also been wearing a knee brace.  When she rests her symptoms improve until she has to go all on extended drives or walking.  She stated she has been going to physical therapy and has improved.    ROS:  No new diagnosis/surgery/prescriptions since last office visit on 05/13/2019.  Constitution: Negative for chills and fever.   HENT: Negative for congestion.    Eyes: Negative for blurred vision.   Cardiovascular: Negative for chest pain.   Respiratory: Negative for cough.    Endocrine: Negative for polydipsia.   Hematologic/Lymphatic: Does not bruise/bleed easily.   Skin: Negative for itching.   Musculoskeletal: Positive for joint pain. Negative for gout.   Gastrointestinal: Positive for heartburn. Negative for constipation and diarrhea.   Genitourinary: Negative for nocturia.   Neurological: Negative for headaches and seizures.   Psychiatric/Behavioral: Negative for substance abuse.   Allergic/Immunologic: Positive for environmental allergies.       Objective:      Physical Exam:   General: AAOx3.  No acute distress  Vascular:  Pulses intact and equal bilaterally.  Capillary refill less than 3 seconds and equal bilaterally  Neurologic:  Pinprick and soft touch intact and equal bilaterally  Integment:  No ecchymosis, no errythema  Extremity: Knee:  Extension/flexion equal bilaterally 0/123 degrees. Mild effusion left knee. Crepitus with motion both knees.  Baker cyst both knees.  Negative patellar load/compression both  knees.  Negative patella apprehension/relocation both knees.  Varus/valgus stressing equal bilaterally with endpoint.  Lachman's/drawer equal bilaterally with endpoint. Positive medial joint line tenderness left knee. Indigo negative both knees.  [place] mildly positive left knee. Nontender with palpation anserine insertion both knees.  No swelling at the anserine insertion both knees.                     Ankle:  Dorsiflexion/plantar flexion equal bilaterally 22/31 degrees. Patient states she has tenderness with motion left ankle. Mild swelling posterolateral left ankle.  Tender with palpation swelling left ankle post lateral swelling. Achilles tendon palpable throughout full distribution equal bilaterally.  Mild tenderness with palpation Achilles tendon left heel.  Nontender at the ATFL bilaterally. Nontender at the deltoid ligament bilaterally. Drawer with good endpoint equal bilaterally. Mild swelling at the Achilles tendon left foot.  Radiography:  No x-ray done today.      Assessment:       Impression:   1.  Medial meniscus tear left knee.  2.  Chondromalacia left knee.  3.  Baker cyst bilateral knees.  4.  Achilles tendinitis left heel.      Plan:       1.  Discussed physical examination and radiographic findings with the patient. Keesha understands that she has been treated for approximately 6 months and is not improving.  At this point she should consider surgical intervention of her knee, but before surgery can be performed she will need an MRI.  She also needs to continue wearing her Cam walker and possibly be placed in a cast.    2.  Refer for an MRI of the left knee.  3.  Final recommendations after MRI is completed.  4.  Offered to place the patient in a cast, she declined.  5.  Continue with knee brace.  6.  Continue with physical therapy.  7.  Mobic 15 mg, 1 p.o. Q day, dispense 30, refill 5.  8.  Continue current work restrictions with sedentary work.  The patient is workman's comp form was  completed.  9.  [facility] portal was discussed with the patient and information was given.  The patient was encouraged to use the portal for future encounters.  10.  Follow up after MRI is completed.  With respect to the patient's Achilles tendon may consider referral to foot and ankle surgery to evaluate the patient and make recommendations in the future.

## 2019-07-01 ENCOUNTER — CLINICAL SUPPORT (OUTPATIENT)
Dept: REHABILITATION | Facility: HOSPITAL | Age: 64
End: 2019-07-01
Payer: COMMERCIAL

## 2019-07-01 DIAGNOSIS — M22.2X2 PATELLOFEMORAL PAIN SYNDROME OF BOTH KNEES: Primary | ICD-10-CM

## 2019-07-01 DIAGNOSIS — M22.2X1 PATELLOFEMORAL PAIN SYNDROME OF BOTH KNEES: Primary | ICD-10-CM

## 2019-07-01 PROCEDURE — 97110 THERAPEUTIC EXERCISES: CPT | Mod: PN

## 2019-07-01 NOTE — PROGRESS NOTES
Physical Therapy Daily Note     Name: Keesha Quezada  Clinic Number: [id number]  Diagnosis:   Encounter Diagnosis   Name Primary?    Patellofemoral pain syndrome of both knees Yes     Physician: Jayme Lucas,   Precautions: Standard  Visit #: 11 of 12  PTA Visit #: 1  Time In: 8:00 AM   Time Out: 9:10 AM   Certification period:  5/28/2019 to 8/31/2019     Subjective     Pt reports: Saw MD Friday, MRI tomorrow and will follow up with him next week. Probably going to do a knee scope.  Pain Scale: Keesha rates pain on a scale of 0-10 to be 3 left knee; left achilles 4 currently.    Objective     Keesha received individual therapeutic exercises to develop strength, endurance and ROM for 45 minutes including:  Nustep level 5 x 15 mins  Toe Taps x 3 mins  Side Steps x 3 mins  Standing Hip Ext/Abd x 20 with Red TB  Achilles Stretch on wedge 3 x 1 min  HS Curls x 30 with blue TB  Fitter: x 20 with 2 blue  Bridges x 20  Ball Squeezes x 3 mins  QS x 3 mins  SLR 10 x 2 - 1#   Hip Abd 10 x 2 - 1#   Clams/Reverse x 15     DNP-   Keesha received the following manual therapy techniques: IASTM to left achilles into calf mm - extremely tender to tissue mobilization medial aspect of gastroc/soleus noted; ankle mobilization into dorsiflexion performed; with passive stretching;   K-tape to achilles on left with 3 lift-off pieces along achilles.        The patient received the following direct contact modalities after being cleared for contraindications:     The patient received the following supervised modalities after being cleared for contradictions:     Written Home Exercises Provided:   Pt demo good understanding of the education provided. Keesha demonstrated good return demonstration of activities.     Education provided re:  Keesha verbalized good understanding of education provided.   No spiritual or educational barriers to learning provided    Assessment     Patient tolerated  exercises well despite reporting increased pain in left knee and achilles; She demonstrates fair activation of distal quads - general decline in muscle tone noted from lack of exercise; She is wearing her custom knee brace on her left knee;  no increased symptoms noted; progress as tolerated to improve strength and mobility and decrease pain.  This is a 63 y.o. female referred to outpatient physical therapy and presents with a medical diagnosis of bilateral knee pain, left knee meniscal injury due to fall and demonstrates limitations as described in the problem list. Pt prognosis is Good. Pt will continue to benefit from skilled outpatient physical therapy to address the deficits listed in the problem list, provide pt/family education and to maximize pt's level of independence in the home and community environment.     LONG TERM GOALS: :   All long term goals continue to remain appropriate as patient did not start her therapy until 5/28/2019.  Evaluation performed on 3/7/2019.    In 6 weeks, pt. Will:     1. Be independent with HEP and SX management   2. Demonstrate Full painfree AROM in bilateral knees and ankles  3. Demonstrate 5/5 strength in BLE's  4. Demonstrate normal gait pattern at community distances as well as up and down steps  5. Subjective pain no more than 2/10 in BLE's with return to full activity  6. LEFS improved from 32% limitation to <10% limitation.     Plan     Continue with established Plan of Care towards PT goals.    Therapist: Jonathan Favre, PTA  7/1/2019

## 2019-07-02 ENCOUNTER — HOSPITAL ENCOUNTER (OUTPATIENT)
Dept: RADIOLOGY | Facility: HOSPITAL | Age: 64
Discharge: HOME | End: 2019-07-02
Attending: ORTHOPAEDIC SURGERY
Payer: COMMERCIAL

## 2019-07-02 DIAGNOSIS — M25.562 LEFT KNEE PAIN, UNSPECIFIED CHRONICITY: ICD-10-CM

## 2019-07-02 PROCEDURE — 73721 MRI JNT OF LWR EXTRE W/O DYE: CPT | Mod: TC,LT

## 2019-07-02 PROCEDURE — 73721 MRI KNEE WITHOUT CONTRAST LEFT: ICD-10-PCS | Mod: 26,LT,, | Performed by: RADIOLOGY

## 2019-07-02 PROCEDURE — 73721 MRI JNT OF LWR EXTRE W/O DYE: CPT | Mod: 26,LT,, | Performed by: RADIOLOGY

## 2019-07-03 ENCOUNTER — CLINICAL SUPPORT (OUTPATIENT)
Dept: REHABILITATION | Facility: HOSPITAL | Age: 64
End: 2019-07-03
Payer: COMMERCIAL

## 2019-07-03 DIAGNOSIS — M22.2X2 PATELLOFEMORAL PAIN SYNDROME OF BOTH KNEES: Primary | ICD-10-CM

## 2019-07-03 DIAGNOSIS — M22.2X1 PATELLOFEMORAL PAIN SYNDROME OF BOTH KNEES: Primary | ICD-10-CM

## 2019-07-03 PROCEDURE — 97110 THERAPEUTIC EXERCISES: CPT | Mod: PN

## 2019-07-12 ENCOUNTER — OFFICE VISIT (OUTPATIENT)
Dept: ORTHOPEDICS | Facility: CLINIC | Age: 64
End: 2019-07-12
Payer: COMMERCIAL

## 2019-07-12 VITALS
HEART RATE: 86 BPM | SYSTOLIC BLOOD PRESSURE: 163 MMHG | WEIGHT: 188.06 LBS | HEIGHT: 65 IN | BODY MASS INDEX: 31.33 KG/M2 | DIASTOLIC BLOOD PRESSURE: 72 MMHG

## 2019-07-12 DIAGNOSIS — M71.22 BAKER CYST, LEFT: ICD-10-CM

## 2019-07-12 DIAGNOSIS — S83.242D OTHER TEAR OF MEDIAL MENISCUS OF LEFT KNEE AS CURRENT INJURY, SUBSEQUENT ENCOUNTER: Primary | ICD-10-CM

## 2019-07-12 DIAGNOSIS — M22.42 CHONDROMALACIA, PATELLA, LEFT: ICD-10-CM

## 2019-07-12 DIAGNOSIS — M76.62 ACHILLES TENDINITIS, LEFT LEG: ICD-10-CM

## 2019-07-12 DIAGNOSIS — M93.262 OSTEOCHONDRITIS DISSECANS OF LEFT KNEE: ICD-10-CM

## 2019-07-12 PROCEDURE — 99213 PR OFFICE/OUTPT VISIT, EST, LEVL III, 20-29 MIN: ICD-10-PCS | Mod: S$GLB,,, | Performed by: ORTHOPAEDIC SURGERY

## 2019-07-12 PROCEDURE — 99213 OFFICE O/P EST LOW 20 MIN: CPT | Mod: S$GLB,,, | Performed by: ORTHOPAEDIC SURGERY

## 2019-07-12 PROCEDURE — 99999 PR PBB SHADOW E&M-EST. PATIENT-LVL III: ICD-10-PCS | Mod: PBBFAC,,, | Performed by: ORTHOPAEDIC SURGERY

## 2019-07-12 PROCEDURE — 99999 PR PBB SHADOW E&M-EST. PATIENT-LVL III: CPT | Mod: PBBFAC,,, | Performed by: ORTHOPAEDIC SURGERY

## 2019-07-12 NOTE — PROGRESS NOTES
Subjective:      Patient ID: Keesha Quezada is a 63 y.o. female.    Chief Complaint: Results (MRI Left Knee Results)      HPI: Ms. Quezada returns today to review the results of an MRI of her left knee. She stated she is still having pain in her knee. She stated she has intermittently wearing her brace.  She stopped wearing her CAM walker because she could not wear it and the brace at the same time. She still has persistent pain in her left heel.    ROS:  No new diagnosis/surgery/prescriptions since last office visit on  Constitution: Negative for chills and fever.   HENT: Negative for congestion.    Eyes: Negative for blurred vision.   Cardiovascular: Negative for chest pain.   Respiratory: Negative for cough.    Endocrine: Negative for polydipsia.   Hematologic/Lymphatic: Does not bruise/bleed easily.   Skin: Negative for itching.   Musculoskeletal: Positive for joint pain. Negative for gout.   Gastrointestinal: Positive for heartburn. Negative for constipation and diarrhea.   Genitourinary: Negative for nocturia.   Neurological: Negative for headaches and seizures.   Psychiatric/Behavioral: Negative for substance abuse.   Allergic/Immunologic: Positive for environmental allergies.       Objective:      Physical Exam:   General: AAOx3.  No acute distress  Vascular:  Pulses intact and equal bilaterally.  Capillary refill less than 3 seconds and equal bilaterally  Neurologic:  Pinprick and soft touch intact and equal bilaterally  Integment:  No ecchymosis, no errythema  Extremity: Knee:  Extension/flexion equal bilaterally 0/123 degrees. Mild effusion left knee. Crepitus with motion both knees.  Baker cyst both knees.  Negative patellar load/compression both knees.  Negative patella apprehension/relocation both knees.  Varus/valgus stressing equal bilaterally with endpoint.  Lachman's/drawer equal bilaterally with endpoint. Positive medial joint line tenderness left knee. Indigo negative both knees.  [place]  mildly positive left knee. Nontender with palpation anserine insertion both knees.  No swelling at the anserine insertion both knees.                     Ankle:  Dorsiflexion/plantar flexion equal bilaterally 22/31 degrees. Patient states she has tenderness with motion left ankle. Mild swelling posterolateral left ankle.  Tender with palpation swelling left ankle post lateral swelling. Achilles tendon palpable throughout full distribution equal bilaterally.  Mild tenderness with palpation Achilles tendon left heel.  Nontender at the ATFL bilaterally. Nontender at the deltoid ligament bilaterally. Drawer with good endpoint equal bilaterally. Mild swelling at the Achilles tendon left foot.  Radiography:  MRI of the left knee completed on 07/02/2019 showed a medial femoral condyle OCD, medial meniscus tear and chondromalacia patella.      Assessment:       Impression:   1.  Medial meniscus tear, left knee.  2.  Medial femoral condyle OCD, left knee.  3.  Baker cyst left knee.  4.  Chondromalacia patella, left knee.  5.  Chronic Achilles tendinitis, left heel.      Plan:       1.  Discussed physical examination and radiographic findings with the patient. Keesha understands that she has a medial meniscus tear in OCD of her left knee. She also understands she has chronic Achilles tendinitis.  Discussed with the patient in detail at this point she has been treated conservatively and she is not improving and recommend she proceed with surgery on her knee to see if she can resolve her pain. She was unsure if she want to proceed with surgery. She also questioned why her heel continues to hurt explained to the patient that she is not wearing her Cam Walker an order to try to ensure that she improves it is recommended she be placed in a cast.  2.  Offered surgery to the patient she stated that workman's comp called her and wanted a 2nd opinion because she is not progressing.  Discussed with the patient that feel it is a good idea  for her to get a 2nd opinion as at this point is recommend she proceed with surgery and if she does not want to proceed with surgery then she can discuss it with a 2nd opinion surgeon.  3.  Offered to place the patient in a cast she declined.  Discussed with the patient that if she were placed in a cast she should improve as her ankle will be immobilized to give her heel a chance to heal she did not want a cast.  4.  Continue with NSAIDs as tolerated allowed by PCM.  5.  At this point since the patient is not improving and is not a medial bowl to proceeding with surgery or cast immobilization recommend she get a 2nd opinion and be referred for 2nd opinion.  A referral was made.  6.  Continue with knee brace.  7.  Since the patient will not have a cast recommend she wear her Cam walker.  8.  Continue with current workman's comp restrictions sedentary work.  9.  [facility] portal was discussed with the patient and information was given.  The patient was encouraged to use the portal for future encounters.  10.  Follow up after 2nd opinion or if the patient decides to proceed with surgery at this point since she does not want surgery and does not want cast immobilization there is nothing further that can be done as she could be return to full work until she is ready to proceed with surgery or casting.

## 2019-08-09 ENCOUNTER — OFFICE VISIT (OUTPATIENT)
Dept: ORTHOPEDICS | Facility: CLINIC | Age: 64
End: 2019-08-09
Payer: COMMERCIAL

## 2019-08-09 VITALS
HEIGHT: 65 IN | BODY MASS INDEX: 31.33 KG/M2 | SYSTOLIC BLOOD PRESSURE: 136 MMHG | RESPIRATION RATE: 19 BRPM | WEIGHT: 188.06 LBS | DIASTOLIC BLOOD PRESSURE: 86 MMHG | HEART RATE: 98 BPM

## 2019-08-09 DIAGNOSIS — M67.972 DISORDER OF LEFT ACHILLES TENDON: ICD-10-CM

## 2019-08-09 DIAGNOSIS — M94.262 CHONDROMALACIA OF KNEE, LEFT: ICD-10-CM

## 2019-08-09 DIAGNOSIS — S83.249A MEDIAL MENISCUS TEAR: ICD-10-CM

## 2019-08-09 DIAGNOSIS — M70.52 PES ANSERINUS BURSITIS OF LEFT KNEE: ICD-10-CM

## 2019-08-09 DIAGNOSIS — M22.2X2 PATELLOFEMORAL PAIN SYNDROME OF LEFT KNEE: ICD-10-CM

## 2019-08-09 DIAGNOSIS — M22.2X1 PATELLOFEMORAL PAIN SYNDROME OF BOTH KNEES: ICD-10-CM

## 2019-08-09 DIAGNOSIS — M71.21 BAKER'S CYST OF KNEE, RIGHT: ICD-10-CM

## 2019-08-09 DIAGNOSIS — S83.242A ACUTE MEDIAL MENISCUS TEAR OF LEFT KNEE, INITIAL ENCOUNTER: Primary | ICD-10-CM

## 2019-08-09 DIAGNOSIS — M93.262 OSTEOCHONDRITIS DISSECANS OF LEFT KNEE: ICD-10-CM

## 2019-08-09 DIAGNOSIS — D16.9 OSTEOCHONDROMA: ICD-10-CM

## 2019-08-09 DIAGNOSIS — Z01.818 PRE-OP EXAM: Primary | ICD-10-CM

## 2019-08-09 DIAGNOSIS — S83.412A SPRAIN OF MEDIAL COLLATERAL LIGAMENT OF LEFT KNEE, INITIAL ENCOUNTER: ICD-10-CM

## 2019-08-09 DIAGNOSIS — Z01.818 PRE-OP EXAM: ICD-10-CM

## 2019-08-09 DIAGNOSIS — M71.21 BAKER CYST, RIGHT: ICD-10-CM

## 2019-08-09 DIAGNOSIS — M70.52 PES ANSERINUS BURSITIS OF BOTH KNEES: ICD-10-CM

## 2019-08-09 DIAGNOSIS — M22.2X2 PATELLOFEMORAL PAIN SYNDROME OF BOTH KNEES: ICD-10-CM

## 2019-08-09 DIAGNOSIS — M71.22 BAKER CYST, LEFT: ICD-10-CM

## 2019-08-09 DIAGNOSIS — M70.51 PES ANSERINUS BURSITIS OF BOTH KNEES: ICD-10-CM

## 2019-08-09 DIAGNOSIS — S83.242D OTHER TEAR OF MEDIAL MENISCUS OF LEFT KNEE AS CURRENT INJURY, SUBSEQUENT ENCOUNTER: Primary | ICD-10-CM

## 2019-08-09 PROCEDURE — 99214 OFFICE O/P EST MOD 30 MIN: CPT | Mod: 57,S$GLB,, | Performed by: ORTHOPAEDIC SURGERY

## 2019-08-09 PROCEDURE — 99999 PR PBB SHADOW E&M-EST. PATIENT-LVL III: ICD-10-PCS | Mod: PBBFAC,,, | Performed by: ORTHOPAEDIC SURGERY

## 2019-08-09 PROCEDURE — 99214 PR OFFICE/OUTPT VISIT, EST, LEVL IV, 30-39 MIN: ICD-10-PCS | Mod: 57,S$GLB,, | Performed by: ORTHOPAEDIC SURGERY

## 2019-08-09 PROCEDURE — 99999 PR PBB SHADOW E&M-EST. PATIENT-LVL III: CPT | Mod: PBBFAC,,, | Performed by: ORTHOPAEDIC SURGERY

## 2019-08-09 RX ORDER — SODIUM CHLORIDE 9 MG/ML
INJECTION, SOLUTION INTRAVENOUS CONTINUOUS
Status: CANCELLED | OUTPATIENT
Start: 2019-08-09

## 2019-08-09 RX ORDER — MUPIROCIN 20 MG/G
OINTMENT TOPICAL
Status: CANCELLED | OUTPATIENT
Start: 2019-08-09

## 2019-08-09 NOTE — PROGRESS NOTES
Chief Complaint: Pain and Injury of the Right Knee and Pain and Injury of the Left Knee     Referring Provider: No address on file     HPI:  Ms. Quezada is a 64-year-old female who returned today for re-evaluation of both her knees and her left heel.  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 It was discussed with the patient at that time proceeding with surgery, but before proceeding with surgery her workman's comp carrier required a 2nd opinion/ISSA.  The patient has had her ISSA and is back for re-evaluation.  She was originally seen on 2019 for approximately 3 months of bilateral knee pain left greater than right which began after she slipped and fell on a floor in a hotel.  She has completed conservative care which included NSAIDs, bracing, physical therapy, and injections and although her right knee resolved with conservative management her left knee has continued to give her issues.   Going from a seated to a standing position or standing to a seated position still increases her symptoms. She feels her left knee is unstable.  She has swelling and giving way but denied locking.   She is still having pain in her left Achilles.  She is still being treated in a Cam walker with her left Achilles and gets intermittent resolution.           Past Medical History:   Diagnosis Date    Hyperlipidemia       *  *  *  *  * Hypertension  Diabetes  Asthma  Seasonal allergies  Depression  GERD              Past Surgical History:   Procedure Laterality Date     *  *  SECTION  TUBAL LIGATION  ORAL IMPLANTS             Review of patient's allergies indicates:  No Known Allergies     Social History      Occupational History    [employer] officer for [employer]      Tobacco Use    Smoking status: Never Smoker    Smokeless tobacco: Never Used   Substance and Sexual Activity    Alcohol use:  Yes       Comment: occ    Drug use: No    Sexual activity: Yes            Family History   Problem Relation Age of Onset    ALS Mother      Cancer Father      Heart disease Father      Stroke Sister      No Known Problems Brother      No Known Problems Sister      No Known Problems Brother      No Known Problems Son      No Known Problems Daughter           Previous Hospitalizations:  Childbirth.     ROS:  no new diagnosis/surgery/prescriptions since last office visit on 07/12/2019.  Constitution: Negative for chills and fever.   HENT: Negative for congestion.    Eyes: Negative for blurred vision.   Cardiovascular: Negative for chest pain.   Respiratory: Negative for cough.    Endocrine: Negative for polydipsia.   Hematologic/Lymphatic: Does not bruise/bleed easily.   Skin: Negative for itching.   Musculoskeletal: Positive for joint pain. Negative for gout.   Gastrointestinal: Positive for heartburn. Negative for constipation and diarrhea.   Genitourinary: Negative for nocturia.   Neurological: Negative for headaches and seizures.   Psychiatric/Behavioral: Negative for substance abuse.   Allergic/Immunologic: Positive for environmental allergies.          Objective:   Physical Exam:   General: AAOx3.  No acute distress  HEENT: Normocephalic, PEARLA EOMI, Good Dentition  Neck: Supple, No JVD  Chest: Symetric, equal excursion on inspiration  Abdomen: Soft NTND  Vascular:  Pulses intact and equal bilaterally.  Capillary refill less than 3 seconds and equal bilaterally  Neurologic:  Pinprick and soft touch intact and equal bilaterally  Integment:  No ecchymosis, no errythema  Extremity: Knee:  Extension/flexion equal bilaterally 0/123 degrees. Mild effusion left knee. Crepitus with motion both knees.  Baker cyst both knees.  Negative patellar load/compression both knees.  Negative patella apprehension/relocation both knees.  Varus/valgus stressing equal bilaterally with endpoint.  Lachman's/drawer equal  bilaterally with endpoint. Positive medial joint line tenderness left knee. Indigo negative both knees.  [place] mildly positive left knee. Nontender with palpation anserine insertion both knees.  No swelling at the anserine insertion both knees.                     Ankle:  Dorsiflexion/plantar flexion equal bilaterally 22/31 degrees. Patient states she has tenderness with motion left ankle. Mild swelling posterolateral left ankle.  Tender with palpation swelling left ankle post lateral swelling. Achilles tendon palpable throughout full distribution equal bilaterally.  Mild tenderness with palpation Achilles tendon left heel.  Nontender at the ATFL bilaterally. Nontender at the deltoid ligament bilaterally. Drawer with good endpoint equal bilaterally. Mild swelling at the Achilles tendon left foot.  Radiography:  Previous MRI of the left knee completed on 07/02/2019 showed a medial femoral condyle OCD, medial meniscus tear and chondromalacia patella.   Previous x-rays of both knees completed on 01/11/2019 which showed patellofemoral arthritis and a fabella bilaterally no fracture or dislocation.      Assessment:       Impression:       1. Medial meniscus tear, left knee.   2. Medial femoral condyle OCD, left knee.   3. Patellofemoral chondromalacia, left knee.   4. Baker cyst, left knee.   5. Chronic Achilles tendinitis, left ankle.          Plan:       1.  Discussed physical examination and radiographic findings with the patient. Keesha understands that she has a medial meniscus tear with a medial femoral condyle OCD.  After review of her ISSA it appears that the reviewing  has assumed that Ms. Quezada has undergone arthroscopy on her knee as he has recommended no further surgery and only treating her with viscosupplementation.  Since Ms. Quezada has not undergone arthroscopy her knee she may improve with surgical intervention and at this point recommend she proceed with arthroscopy on her left knee as she has  failed all conservative management which includes NSAIDs, bracing, physical therapy, and injections to include viscosupplementation.  She cannot take cortisone.  The patient stated she would like to proceed with surgery on her knee. No intervention is needed for her right knee as it has resolved all symptoms. She can continue to treat her left ankle with a Cam walker.  2.  Possible complications of surgery to include bleeding, infection, scarring, nerve/blood vessel/tendon damage, need for further surgery, failed surgery, failure to improve, possible persistent pain, possible recurrence, possible arthritis, and possible arthrofibrosis were discussed with the patient. The patient was permitted to ask questions and all concerns were addressed to her satisfaction.  3.  Consent for arthroscopy of the left knee.  4.  Tentatively schedule surgery for 09/03/2019.  5.  Continue with NSAIDs as tolerated allowed by PCM.  6.  Continue with home exercises as previously discussed.  7.  Discontinue physical therapy until postop.  8.  Continue with brace on left knee and CAM walker on left ankle.  9.  Continue with current work restrictions:  Sedentary work  10.  All postoperative medications will be prescribed on the date of surgery.  11.  [facility] portal was discussed with the patient and information was given.  The patient was encouraged to use the portal for future encounters.  12.  Follow up 10-12 days postoperatively.

## 2019-08-09 NOTE — H&P
Chief Complaint: Pain and Injury of the Right Knee and Pain and Injury of the Left Knee      HPI:  Ms. Quezada is a 64-year-old female who returned today for re-evaluation of both her knees and her left heel.  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 It was discussed with the patient at that time proceeding with surgery, but before proceeding with surgery her workman's comp carrier required a 2nd opinion/ISSA.  The patient has had her ISSA and is back for re-evaluation.  She was originally seen on 2019 for approximately 3 months of bilateral knee pain left greater than right which began after she slipped and fell on a floor in a hotel.  She has completed conservative care which included NSAIDs, bracing, physical therapy, and injections and although her right knee resolved with conservative management her left knee has continued to give her issues.   Going from a seated to a standing position or standing to a seated position still increases her symptoms. She feels her left knee is unstable.  She has swelling and giving way but denied locking.   She is still having pain in her left Achilles.  She is still being treated in a Cam walker with her left Achilles and gets intermittent resolution.           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     ROS:  no new diagnosis/surgery/prescriptions since last office visit on 07/12/2019.  Constitution: Negative for chills and fever.   HENT: Negative for congestion.    Eyes: Negative for blurred vision.   Cardiovascular: Negative for chest pain.   Respiratory: Negative for cough.    Endocrine: Negative for polydipsia.   Hematologic/Lymphatic: Does not bruise/bleed easily.   Skin: Negative for itching.   Musculoskeletal: Positive for joint pain. Negative for gout.   Gastrointestinal: Positive for heartburn. Negative for constipation and diarrhea.   Genitourinary: Negative for nocturia.   Neurological: Negative for headaches and seizures.   Psychiatric/Behavioral: Negative for substance abuse.   Allergic/Immunologic: Positive for environmental allergies.          Objective:   Physical Exam:   General: AAOx3.  No acute distress  HEENT: Normocephalic, PEARLA EOMI, Good Dentition  Neck: Supple, No JVD  Chest: Symetric, equal excursion on inspiration  Abdomen: Soft NTND  Vascular:  Pulses intact and equal bilaterally.  Capillary refill less than 3 seconds and equal bilaterally  Neurologic:  Pinprick and soft touch intact and equal bilaterally  Integment:  No ecchymosis, no errythema  Extremity: Knee:  Extension/flexion equal bilaterally 0/123 degrees. Mild effusion left knee. Crepitus with motion both knees.  Baker cyst both knees.  Negative patellar load/compression both knees.  Negative patella apprehension/relocation both knees.  Varus/valgus stressing equal bilaterally with endpoint.  Lachman's/drawer equal bilaterally with endpoint. Positive medial  joint line tenderness left knee. Indigo negative both knees.  [place] mildly positive left knee. Nontender with palpation anserine insertion both knees.  No swelling at the anserine insertion both knees.                     Ankle:  Dorsiflexion/plantar flexion equal bilaterally 22/31 degrees. Patient states she has tenderness with motion left ankle. Mild swelling posterolateral left ankle.  Tender with palpation swelling left ankle post lateral swelling. Achilles tendon palpable throughout full distribution equal bilaterally.  Mild tenderness with palpation Achilles tendon left heel.  Nontender at the ATFL bilaterally. Nontender at the deltoid ligament bilaterally. Drawer with good endpoint equal bilaterally. Mild swelling at the Achilles tendon left foot.  Radiography:  Previous MRI of the left knee completed on 07/02/2019 showed a medial femoral condyle OCD, medial meniscus tear and chondromalacia patella.   Previous x-rays of both knees completed on 01/11/2019 which showed patellofemoral arthritis and a fabella bilaterally no fracture or dislocation.      Assessment:       Impression:       1. Medial meniscus tear, left knee.   2. Medial femoral condyle OCD, left knee.   3. Patellofemoral chondromalacia, left knee.   4. Baker cyst, left knee.   5. Chronic Achilles tendinitis, left ankle.          Plan:       1.  Discussed physical examination and radiographic findings with the patient. Keesha understands that she has a medial meniscus tear with a medial femoral condyle OCD.  After review of her ISSA it appears that the reviewing  has assumed that Ms. Quezada has undergone arthroscopy on her knee as he has recommended no further surgery and only treating her with viscosupplementation.  Since Ms. Quezada has not undergone arthroscopy her knee she may improve with surgical intervention and at this point recommend she proceed with arthroscopy on her left knee as she has failed all conservative management which  includes NSAIDs, bracing, physical therapy, and injections to include viscosupplementation.  She cannot take cortisone.  The patient stated she would like to proceed with surgery on her knee. No intervention is needed for her right knee as it has resolved all symptoms. She can continue to treat her left ankle with a Cam walker.  2.  Possible complications of surgery to include bleeding, infection, scarring, nerve/blood vessel/tendon damage, need for further surgery, failed surgery, failure to improve, possible persistent pain, possible recurrence, possible arthritis, and possible arthrofibrosis were discussed with the patient. The patient was permitted to ask questions and all concerns were addressed to her satisfaction.  3.  Consent for arthroscopy of the left knee.  4.  Tentatively schedule surgery for 09/03/2019.  5.  Continue with NSAIDs as tolerated allowed by PCM.  6.  Continue with home exercises as previously discussed.  7.  Discontinue physical therapy until postop.  8.  Continue with brace on left knee and CAM walker on left ankle.  9.  Continue with current work restrictions:  Sedentary work  10.  All postoperative medications will be prescribed on the date of surgery.  11.  [facility] portal was discussed with the patient and information was given.  The patient was encouraged to use the portal for future encounters.  12.  Follow up 10-12 days postoperatively.

## 2019-09-15 ENCOUNTER — PATIENT MESSAGE (OUTPATIENT)
Dept: ORTHOPEDICS | Facility: CLINIC | Age: 64
End: 2019-09-15

## 2019-10-02 ENCOUNTER — OFFICE VISIT (OUTPATIENT)
Dept: ORTHOPEDICS | Facility: CLINIC | Age: 64
End: 2019-10-02
Payer: COMMERCIAL

## 2019-10-02 VITALS
HEIGHT: 65 IN | DIASTOLIC BLOOD PRESSURE: 94 MMHG | BODY MASS INDEX: 31.33 KG/M2 | SYSTOLIC BLOOD PRESSURE: 156 MMHG | WEIGHT: 188.06 LBS | RESPIRATION RATE: 18 BRPM

## 2019-10-02 DIAGNOSIS — S83.242D TEAR OF MEDIAL MENISCUS OF LEFT KNEE, CURRENT, UNSPECIFIED TEAR TYPE, SUBSEQUENT ENCOUNTER: Primary | ICD-10-CM

## 2019-10-02 DIAGNOSIS — M76.62 LEFT ACHILLES TENDINITIS: ICD-10-CM

## 2019-10-02 DIAGNOSIS — M22.42 CHONDROMALACIA, PATELLA, LEFT: ICD-10-CM

## 2019-10-02 PROCEDURE — 99999 PR PBB SHADOW E&M-EST. PATIENT-LVL III: CPT | Mod: PBBFAC,,, | Performed by: ORTHOPAEDIC SURGERY

## 2019-10-02 PROCEDURE — 99213 PR OFFICE/OUTPT VISIT, EST, LEVL III, 20-29 MIN: ICD-10-PCS | Mod: S$GLB,,, | Performed by: ORTHOPAEDIC SURGERY

## 2019-10-02 PROCEDURE — 99999 PR PBB SHADOW E&M-EST. PATIENT-LVL III: ICD-10-PCS | Mod: PBBFAC,,, | Performed by: ORTHOPAEDIC SURGERY

## 2019-10-02 PROCEDURE — 99213 OFFICE O/P EST LOW 20 MIN: CPT | Mod: S$GLB,,, | Performed by: ORTHOPAEDIC SURGERY

## 2019-10-02 NOTE — PROGRESS NOTES
Subjective:      Patient ID: Keesha Quezada is a 64 y.o. female.    Chief Complaint: Pain of the Left Knee      HPI: Ms. Quezada returns today for re-evaluation of her left knee. At her last visit on 08/09/2019 she was scheduled for surgery on her left knee because she had failed all conservative care and was still symptomatic.  Her surgery was scheduled for 09/03/2019 but it was denied by her workman's comp carrier.  She originally injured her left knee in October 2018 after she slipped and fell on the floor. She has been treated with NSAIDs, injections, physical therapy, and bracing. She stated she still has pain in her knee. She still takes NSAIDs and wears a brace which helps.  She arrived today with shoes on, she removed her boot  prior to the appointment so her heel could be evaluated..  From what can be ascertained it appears that Ms. Quezada has enlisted the help of an  to help her with her workman's comp claim.    ROS:  No new diagnosis/surgery/prescriptions since last office visit on 08/09/2019.  Constitution: Negative for chills and fever.   HENT: Negative for congestion.    Eyes: Negative for blurred vision.   Cardiovascular: Negative for chest pain.   Respiratory: Negative for cough.    Endocrine: Negative for polydipsia.   Hematologic/Lymphatic: Does not bruise/bleed easily.   Skin: Negative for itching.   Musculoskeletal: Positive for joint pain. Negative for gout.   Gastrointestinal: Positive for heartburn. Negative for constipation and diarrhea.   Genitourinary: Negative for nocturia.   Neurological: Negative for headaches and seizures.   Psychiatric/Behavioral: Negative for substance abuse.   Allergic/Immunologic: Positive for environmental allergies.       Objective:      Physical Exam:   General: AAOx3.  No acute distress  Vascular:  Pulses intact and equal bilaterally.  Capillary refill less than 3 seconds and equal bilaterally  Neurologic:  Pinprick and soft touch intact and equal  bilaterally  Integment:  No ecchymosis, no errythema  Extremity: Knee:  Extension/flexion equal bilaterally 0/123 degrees. No effusion either knee.. Crepitus with motion both knees.  Baker cyst both knees.  Mildly positive patellar load/compression both knees.  Negative patella apprehension/relocation both knees.  Varus/valgus stressing equal bilaterally with endpoint.  Lachman's/drawer equal bilaterally with endpoint.  Mild medial joint line tenderness left knee. Indigo negative both knees.  [employer] mildly positive left knee. Nontender with palpation anserine insertion both knees.  No swelling at the anserine insertion both knees.                     Ankle:  Dorsiflexion/plantar flexion equal bilaterally 22/31 degrees. Nontender with bilateral ankle motion. No swelling either ankle..  Nontender with palpation Achilles tendon. Achilles palpable throughout full distribution.  No swelling of the Achilles tendon.   Radiography:  No x-rays done today.      Assessment:       Impression:   1. Medial meniscus tear, left knee.   2. Medial femoral condyle OCD, left knee.   3. Patellofemoral chondromalacia, left knee.   4. Baker cyst, left knee.   5. Resolved Achilles tendinitis, left ankle.         Plan:       1.  Discussed physical examination with the patient. Keesha understands that she still appears to have a symptomatic medial meniscus tear with the femoral condyle OCD and patellofemoral chondromalacia.  Her Achilles tendinitis has resolved.  Discussed in detail with the patient that at this point her options are to continue with conservative management or proceed with surgery.  She stated that she wanted to have workman's comp address approve the surgery before she proceeds.  Discussed with the patient at this point until the surgery is approved the only option for treatment is conservative management of which she is already doing with bracing in NSAIDs.  2.  Continue with brace.  3.  Continue with NSAIDs as  needed.  4.  Continue with home exercises as previously shown.  5.  Would not recommend referring back to physical therapy as she is already completed physical therapy.  6.  The patient is workman's comp form was completed placing her at light work.  7.  Discussed with the patient possibly getting a 3rd opinion and if the 3rd opinion agrees with surgery it may strengthen her case with her workman's comp carrier to approve surgery.  8.  [facility] portal was discussed with the patient and information was given.  The patient was encouraged to use the portal for future encounters.  9.  Follow up p.r.n..

## 2019-10-07 ENCOUNTER — PATIENT MESSAGE (OUTPATIENT)
Dept: ORTHOPEDICS | Facility: CLINIC | Age: 64
End: 2019-10-07

## 2019-10-18 ENCOUNTER — TELEPHONE (OUTPATIENT)
Dept: ORTHOPEDICS | Facility: CLINIC | Age: 64
End: 2019-10-18

## 2019-10-18 NOTE — TELEPHONE ENCOUNTER
Informed today by Nelson at [facility] that patient's workman's comp denied the request for a 3rd medical opinion. Notified Dr. Lucas and he voiced that we can do another appeal for patient's surgery.    Notified [facility].

## 2019-11-12 DIAGNOSIS — Z98.890 S/P KNEE SURGERY: Primary | ICD-10-CM

## 2019-11-15 ENCOUNTER — OFFICE VISIT (OUTPATIENT)
Dept: ORTHOPEDICS | Facility: CLINIC | Age: 64
End: 2019-11-15
Payer: COMMERCIAL

## 2019-11-15 VITALS
WEIGHT: 188.06 LBS | HEART RATE: 95 BPM | SYSTOLIC BLOOD PRESSURE: 123 MMHG | BODY MASS INDEX: 31.33 KG/M2 | DIASTOLIC BLOOD PRESSURE: 77 MMHG | HEIGHT: 65 IN

## 2019-11-15 DIAGNOSIS — M71.22 BAKER'S CYST OF KNEE, LEFT: ICD-10-CM

## 2019-11-15 DIAGNOSIS — M94.20 CHONDROMALACIA: ICD-10-CM

## 2019-11-15 DIAGNOSIS — M25.562 LEFT KNEE PAIN, UNSPECIFIED CHRONICITY: ICD-10-CM

## 2019-11-15 DIAGNOSIS — M70.52 PES ANSERINUS BURSITIS OF LEFT KNEE: ICD-10-CM

## 2019-11-15 DIAGNOSIS — S83.249A MEDIAL MENISCUS TEAR: ICD-10-CM

## 2019-11-15 DIAGNOSIS — S83.242A ACUTE MEDIAL MENISCUS TEAR OF LEFT KNEE, INITIAL ENCOUNTER: ICD-10-CM

## 2019-11-15 DIAGNOSIS — S83.242D TEAR OF MEDIAL MENISCUS OF LEFT KNEE, CURRENT, UNSPECIFIED TEAR TYPE, SUBSEQUENT ENCOUNTER: ICD-10-CM

## 2019-11-15 DIAGNOSIS — M95.8 OSTEOCHONDRAL DEFECT OF FEMORAL CONDYLE: ICD-10-CM

## 2019-11-15 DIAGNOSIS — Z01.818 PRE-OP EXAM: Primary | ICD-10-CM

## 2019-11-15 DIAGNOSIS — M22.42 CHONDROMALACIA, PATELLA, LEFT: ICD-10-CM

## 2019-11-15 DIAGNOSIS — Z51.89 ENCOUNTER FOR WALKER TRAINING: Primary | ICD-10-CM

## 2019-11-15 DIAGNOSIS — M22.2X2 PATELLOFEMORAL PAIN SYNDROME OF LEFT KNEE: ICD-10-CM

## 2019-11-15 PROCEDURE — 99213 OFFICE O/P EST LOW 20 MIN: CPT | Mod: S$GLB,,, | Performed by: ORTHOPAEDIC SURGERY

## 2019-11-15 PROCEDURE — 99999 PR PBB SHADOW E&M-EST. PATIENT-LVL III: CPT | Mod: PBBFAC,,, | Performed by: ORTHOPAEDIC SURGERY

## 2019-11-15 PROCEDURE — 99999 PR PBB SHADOW E&M-EST. PATIENT-LVL III: ICD-10-PCS | Mod: PBBFAC,,, | Performed by: ORTHOPAEDIC SURGERY

## 2019-11-15 PROCEDURE — 99213 PR OFFICE/OUTPT VISIT, EST, LEVL III, 20-29 MIN: ICD-10-PCS | Mod: S$GLB,,, | Performed by: ORTHOPAEDIC SURGERY

## 2019-11-15 RX ORDER — MUPIROCIN 20 MG/G
OINTMENT TOPICAL
Status: CANCELLED | OUTPATIENT
Start: 2019-11-15

## 2019-11-15 RX ORDER — SODIUM CHLORIDE 9 MG/ML
INJECTION, SOLUTION INTRAVENOUS CONTINUOUS
Status: CANCELLED | OUTPATIENT
Start: 2019-11-15

## 2019-11-15 NOTE — H&P (VIEW-ONLY)
Chief Complaint: Pain and Injury of the Right Knee and Pain and Injury of the Left Knee      HPI:  Ms. Quezada is a 64-year-old female who returned today to [facility] scheduling surgery on her left knee. She was originally scheduled for arthroscopy of her left knee on 2019 but her surgery was canceled due to a conflict with her workman's comp carrier.  She has subsequently been approved for proceeding with her surgery. She still has pain in her knee. She has been wearing a brace.  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  She was originally seen on 2019 for approximately 3 months of bilateral knee pain left greater than right which began after she slipped and fell on a floor in a hotel.  She has completed conservative care which included NSAIDs, bracing, physical therapy, and injections and although her right knee resolved with conservative management her left knee has continued to give her issues.   Going from a seated to a standing position or standing to a seated position still increases her symptoms. She feels her left knee is unstable.  She has swelling and giving way but denied locking.                 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     ROS:  No new diagnosis/surgery/prescriptions since last office visit on 10/02/2019.  Constitution: Negative for chills and fever.   HENT: Negative for congestion.    Eyes: Negative for blurred vision.   Cardiovascular: Negative for chest pain.   Respiratory: Negative for cough.    Endocrine: Negative for polydipsia.   Hematologic/Lymphatic: Does not bruise/bleed easily.   Skin: Negative for itching.   Musculoskeletal: Positive for joint pain. Negative for gout.   Gastrointestinal: Positive for heartburn. Negative for constipation and diarrhea.   Genitourinary: Negative for nocturia.   Neurological: Negative for headaches and seizures.   Psychiatric/Behavioral: Negative for substance abuse.   Allergic/Immunologic: Positive for environmental allergies.         Objective:   Physical Exam:   General: AAOx3.  No acute distress  HEENT: Normocephalic, PEARLA EOMI, Good Dentition  Neck: Supple, No JVD  Chest: Symetric, equal excursion on inspiration  Abdomen: Soft NTND  Vascular:  Pulses intact and equal bilaterally.  Capillary refill less than 3 seconds and equal bilaterally  Neurologic:  Pinprick and soft touch intact and equal bilaterally  Integment:  No ecchymosis, no errythema  Extremity: Knee:  Extension/flexion equal bilaterally 0/123 degrees. Mild effusion left knee. Crepitus with motion both knees.  Baker cyst both knees.  Negative patellar load/compression both knees.  Negative patella apprehension/relocation both knees.  Varus/valgus stressing equal bilaterally with endpoint.  Lachman's/drawer equal bilaterally with endpoint. Positive medial joint line tenderness left knee. Indigo negative both knees.  [place] mildly positive left knee.  Nontender with palpation anserine insertion both knees.  No swelling at the anserine insertion both knees.  Radiography:  Previous MRI of the left knee completed on 07/02/2019 showed a medial femoral condyle OCD, medial meniscus tear and chondromalacia patella.   Previous x-rays of both knees completed on 01/11/2019showed patellofemoral arthritis and a fabella bilaterally no fracture or dislocation.     Assessment:      Impression:       1. Medial meniscus tear, left knee.   2. Medial femoral condyle OCD, left knee.   3. Patellofemoral chondromalacia, left knee.   4. Baker cyst, left knee.             Plan:      1.  Discussed physical examination with the patient. Keesha understands that she still has a medial meniscus tear with a medial femoral condyle OCD.  Since Ms. Quezada has has failed all conservative management which includes NSAIDs, bracing, physical therapy, and injections to include viscosupplementation she would like to reschedule surgery.    2.  Possible complications of surgery to include bleeding, infection, scarring, nerve/blood vessel/tendon damage, need for further surgery, failed surgery, failure to improve, possible persistent pain, possible recurrence, possible arthritis, and possible arthrofibrosis were discussed with the patient. The patient was permitted to ask questions and all concerns were addressed to her satisfaction.  3.  Consent for arthroscopy of the left knee.  4.  Tentatively schedule surgery for 12/03/2019  5.  Continue with NSAIDs as tolerated allowed by PCM.  6.  Continue with home exercises as previously discussed.  7.  Will possibly refer her back to physical therapy postoperatively depending on operative findings  8.  Continue with brace on left knee.  9.  Continue with current work restrictions:  Sedentary work workman comp form was completed.  Postoperatively she will be non weight-bearing on her left lower extremity will not be able to drive  10.  All postoperative  medications will be prescribed on the date of surgery.  11.  A prescription for the patient to be able to obtain a walker for postop use was given to her.  12.  Continue with home exercises to include quadriceps and hamstring strengthening.  13.  [facility] portal was discussed with the patient and information was given.  The patient was encouraged to use the portal for future encounters.  14.  Follow up 10-12 days postoperatively.

## 2019-11-15 NOTE — PROGRESS NOTES
Chief Complaint: Pain and Injury of the Right Knee and Pain and Injury of the Left Knee      HPI:  Ms. Quezada is a 64-year-old female who returned today to [facility] scheduling surgery on her left knee. She was originally scheduled for arthroscopy of her left knee on 2019 but her surgery was canceled due to a conflict with her workman's comp carrier.  She has subsequently been approved for proceeding with her surgery. She still has pain in her knee. She has been wearing a brace.  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  She was originally seen on 2019 for approximately 3 months of bilateral knee pain left greater than right which began after she slipped and fell on a floor in a hotel.  She has completed conservative care which included NSAIDs, bracing, physical therapy, and injections and although her right knee resolved with conservative management her left knee has continued to give her issues.   Going from a seated to a standing position or standing to a seated position still increases her symptoms. She feels her left knee is unstable.  She has swelling and giving way but denied locking.                 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     ROS:  No new diagnosis/surgery/prescriptions since last office visit on 10/02/2019.  Constitution: Negative for chills and fever.   HENT: Negative for congestion.    Eyes: Negative for blurred vision.   Cardiovascular: Negative for chest pain.   Respiratory: Negative for cough.    Endocrine: Negative for polydipsia.   Hematologic/Lymphatic: Does not bruise/bleed easily.   Skin: Negative for itching.   Musculoskeletal: Positive for joint pain. Negative for gout.   Gastrointestinal: Positive for heartburn. Negative for constipation and diarrhea.   Genitourinary: Negative for nocturia.   Neurological: Negative for headaches and seizures.   Psychiatric/Behavioral: Negative for substance abuse.   Allergic/Immunologic: Positive for environmental allergies.         Objective:   Physical Exam:   General: AAOx3.  No acute distress  HEENT: Normocephalic, PEARLA EOMI, Good Dentition  Neck: Supple, No JVD  Chest: Symetric, equal excursion on inspiration  Abdomen: Soft NTND  Vascular:  Pulses intact and equal bilaterally.  Capillary refill less than 3 seconds and equal bilaterally  Neurologic:  Pinprick and soft touch intact and equal bilaterally  Integment:  No ecchymosis, no errythema  Extremity: Knee:  Extension/flexion equal bilaterally 0/123 degrees. Mild effusion left knee. Crepitus with motion both knees.  Baker cyst both knees.  Negative patellar load/compression both knees.  Negative patella apprehension/relocation both knees.  Varus/valgus stressing equal bilaterally with endpoint.  Lachman's/drawer equal bilaterally with endpoint. Positive medial joint line tenderness left knee. Indigo negative both knees.  Joslyn mildly positive left knee.  Nontender with palpation anserine insertion both knees.  No swelling at the anserine insertion both knees.  Radiography:  Previous MRI of the left knee completed on 07/02/2019 showed a medial femoral condyle OCD, medial meniscus tear and chondromalacia patella.   Previous x-rays of both knees completed on 01/11/2019showed patellofemoral arthritis and a fabella bilaterally no fracture or dislocation.     Assessment:      Impression:       1. Medial meniscus tear, left knee.   2. Medial femoral condyle OCD, left knee.   3. Patellofemoral chondromalacia, left knee.   4. Baker cyst, left knee.             Plan:      1.  Discussed physical examination with the patient. Keesha understands that she still has a medial meniscus tear with a medial femoral condyle OCD.  Since Ms. Quezada has has failed all conservative management which includes NSAIDs, bracing, physical therapy, and injections to include viscosupplementation she would like to reschedule surgery.    2.  Possible complications of surgery to include bleeding, infection, scarring, nerve/blood vessel/tendon damage, need for further surgery, failed surgery, failure to improve, possible persistent pain, possible recurrence, possible arthritis, and possible arthrofibrosis were discussed with the patient. The patient was permitted to ask questions and all concerns were addressed to her satisfaction.  3.  Consent for arthroscopy of the left knee.  4.  Tentatively schedule surgery for 12/03/2019  5.  Continue with NSAIDs as tolerated allowed by PCM.  6.  Continue with home exercises as previously discussed.  7.  Will possibly refer her back to physical therapy postoperatively depending on operative findings  8.  Continue with brace on left knee.  9.  Continue with current work restrictions:  Sedentary work workman comp form was completed.  Postoperatively she will be non weight-bearing on her left lower extremity will not be able to drive  10.  All postoperative  medications will be prescribed on the date of surgery.  11.  A prescription for the patient to be able to obtain a walker for postop use was given to her.  12.  Continue with home exercises to include quadriceps and hamstring strengthening.  13.  [facility] portal was discussed with the patient and information was given.  The patient was encouraged to use the portal for future encounters.  14.  Follow up 10-12 days postoperatively.

## 2019-11-15 NOTE — H&P
Chief Complaint: Pain and Injury of the Right Knee and Pain and Injury of the Left Knee      HPI:  Ms. Quezada is a 64-year-old female who returned today to [facility] scheduling surgery on her left knee. She was originally scheduled for arthroscopy of her left knee on 2019 but her surgery was canceled due to a conflict with her workman's comp carrier.  She has subsequently been approved for proceeding with her surgery. She still has pain in her knee. She has been wearing a brace.  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  She was originally seen on 2019 for approximately 3 months of bilateral knee pain left greater than right which began after she slipped and fell on a floor in a hotel.  She has completed conservative care which included NSAIDs, bracing, physical therapy, and injections and although her right knee resolved with conservative management her left knee has continued to give her issues.   Going from a seated to a standing position or standing to a seated position still increases her symptoms. She feels her left knee is unstable.  She has swelling and giving way but denied locking.                 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     ROS:  No new diagnosis/surgery/prescriptions since last office visit on 10/02/2019.  Constitution: Negative for chills and fever.   HENT: Negative for congestion.    Eyes: Negative for blurred vision.   Cardiovascular: Negative for chest pain.   Respiratory: Negative for cough.    Endocrine: Negative for polydipsia.   Hematologic/Lymphatic: Does not bruise/bleed easily.   Skin: Negative for itching.   Musculoskeletal: Positive for joint pain. Negative for gout.   Gastrointestinal: Positive for heartburn. Negative for constipation and diarrhea.   Genitourinary: Negative for nocturia.   Neurological: Negative for headaches and seizures.   Psychiatric/Behavioral: Negative for substance abuse.   Allergic/Immunologic: Positive for environmental allergies.         Objective:   Physical Exam:   General: AAOx3.  No acute distress  HEENT: Normocephalic, PEARLA EOMI, Good Dentition  Neck: Supple, No JVD  Chest: Symetric, equal excursion on inspiration  Abdomen: Soft NTND  Vascular:  Pulses intact and equal bilaterally.  Capillary refill less than 3 seconds and equal bilaterally  Neurologic:  Pinprick and soft touch intact and equal bilaterally  Integment:  No ecchymosis, no errythema  Extremity: Knee:  Extension/flexion equal bilaterally 0/123 degrees. Mild effusion left knee. Crepitus with motion both knees.  Baker cyst both knees.  Negative patellar load/compression both knees.  Negative patella apprehension/relocation both knees.  Varus/valgus stressing equal bilaterally with endpoint.  Lachman's/drawer equal bilaterally with endpoint. Positive medial joint line tenderness left knee. Indigo negative both knees.  Joslyn mildly positive left knee.  Nontender with palpation anserine insertion both knees.  No swelling at the anserine insertion both knees.  Radiography:  Previous MRI of the left knee completed on 07/02/2019 showed a medial femoral condyle OCD, medial meniscus tear and chondromalacia patella.   Previous x-rays of both knees completed on 01/11/2019showed patellofemoral arthritis and a fabella bilaterally no fracture or dislocation.     Assessment:      Impression:       1. Medial meniscus tear, left knee.   2. Medial femoral condyle OCD, left knee.   3. Patellofemoral chondromalacia, left knee.   4. Baker cyst, left knee.             Plan:      1.  Discussed physical examination with the patient. Keesha understands that she still has a medial meniscus tear with a medial femoral condyle OCD.  Since Ms. Quezada has has failed all conservative management which includes NSAIDs, bracing, physical therapy, and injections to include viscosupplementation she would like to reschedule surgery.    2.  Possible complications of surgery to include bleeding, infection, scarring, nerve/blood vessel/tendon damage, need for further surgery, failed surgery, failure to improve, possible persistent pain, possible recurrence, possible arthritis, and possible arthrofibrosis were discussed with the patient. The patient was permitted to ask questions and all concerns were addressed to her satisfaction.  3.  Consent for arthroscopy of the left knee.  4.  Tentatively schedule surgery for 12/03/2019  5.  Continue with NSAIDs as tolerated allowed by PCM.  6.  Continue with home exercises as previously discussed.  7.  Will possibly refer her back to physical therapy postoperatively depending on operative findings  8.  Continue with brace on left knee.  9.  Continue with current work restrictions:  Sedentary work workman comp form was completed.  Postoperatively she will be non weight-bearing on her left lower extremity will not be able to drive  10.  All postoperative  medications will be prescribed on the date of surgery.  11.  A prescription for the patient to be able to obtain a walker for postop use was given to her.  12.  Continue with home exercises to include quadriceps and hamstring strengthening.  13.  [facility] portal was discussed with the patient and information was given.  The patient was encouraged to use the portal for future encounters.  14.  Follow up 10-12 days postoperatively.

## 2019-11-20 ENCOUNTER — TELEPHONE (OUTPATIENT)
Dept: ORTHOPEDICS | Facility: CLINIC | Age: 64
End: 2019-11-20

## 2019-11-20 NOTE — TELEPHONE ENCOUNTER
Called patient to inform her the her FMLA paperwork has been completed and is ready for  at the [place] . Patient notified and voiced understanding.

## 2019-11-22 ENCOUNTER — TELEPHONE (OUTPATIENT)
Dept: ORTHOPEDICS | Facility: CLINIC | Age: 64
End: 2019-11-22

## 2019-11-22 NOTE — TELEPHONE ENCOUNTER
----- Message from Garrett Fam sent at 11/22/2019  1:51 PM CST -----  Contact: Susana Espino ,[phone]  employer needs clarification on work restrictions, info release on record, please call, Susana Espino ,[phone]

## 2019-11-26 ENCOUNTER — ANESTHESIA EVENT (OUTPATIENT)
Dept: SURGERY | Facility: HOSPITAL | Age: 64
End: 2019-11-26
Payer: COMMERCIAL

## 2019-11-26 ENCOUNTER — HOSPITAL ENCOUNTER (OUTPATIENT)
Dept: PREADMISSION TESTING | Facility: HOSPITAL | Age: 64
Discharge: HOME | End: 2019-11-26
Attending: ORTHOPAEDIC SURGERY
Payer: COMMERCIAL

## 2019-11-26 RX ORDER — GLIMEPIRIDE 2 MG/1
2 TABLET ORAL
COMMUNITY
End: 2020-10-23 | Stop reason: SDUPTHER

## 2019-11-26 NOTE — ANESTHESIA PREPROCEDURE EVALUATION
11/26/2019  Keesha Quezada is a 64 y.o., female.    Pre-op Assessment    I have reviewed the Patient Summary Reports.    I have reviewed the Nursing Notes.   I have reviewed the Medications.     Review of Systems  Anesthesia Hx:  No problems with previous Anesthesia  Neg history of prior surgery. Denies Family Hx of Anesthesia complications.   Denies Personal Hx of Anesthesia complications.   Social:  Non-Smoker    Hematology/Oncology:  Hematology Normal   Oncology Normal     EENT/Dental:EENT/Dental Normal   Cardiovascular:   Hypertension, well controlled    Pulmonary:  Pulmonary Normal    Renal/:  Renal/ Normal     Hepatic/GI:  Hepatic/GI Normal    Musculoskeletal:  Musculoskeletal Normal    Neurological:  Neurology Normal    Endocrine:   Diabetes, well controlled, type 2    Dermatological:  Skin Normal    Psych:  Psychiatric Normal           Physical Exam  General:  Well nourished    Airway/Jaw/Neck:  Airway Findings: Mouth Opening: Normal Tongue: Normal  General Airway Assessment: Adult  Mallampati: III  TM Distance: 4 - 6 cm        Eyes/Ears/Nose:  EYES/EARS/NOSE FINDINGS: Normal   Dental:  DENTAL FINDINGS: Normal   Chest/Lungs:  Chest/Lungs Clear    Heart/Vascular:  Heart Findings: Normal Heart murmur: negative Vascular Findings: Normal    Abdomen:  Abdomen Findings: Normal    Musculoskeletal:  Musculoskeletal Findings: Normal   Skin:  Skin Findings: Normal    Mental Status:  Mental Status Findings: Normal        Anesthesia Plan  Type of Anesthesia, risks & benefits discussed:  Anesthesia Type:  general  Patient's Preference:   Intra-op Monitoring Plan: standard ASA monitors  Intra-op Monitoring Plan Comments:   Post Op Pain Control Plan:   Post Op Pain Control Plan Comments:   Induction:   IV  Beta Blocker:  Patient is not currently on a Beta-Blocker (No further documentation required).       Informed  Consent: Patient understands risks and agrees with Anesthesia plan.  Questions answered. Anesthesia consent signed with patient.  ASA Score: 2     Day of Surgery Review of History & Physical:    H&P update referred to the provider.

## 2019-12-03 ENCOUNTER — HOSPITAL ENCOUNTER (OUTPATIENT)
Facility: HOSPITAL | Age: 64
Discharge: HOME | End: 2019-12-03
Attending: ORTHOPAEDIC SURGERY | Admitting: ORTHOPAEDIC SURGERY
Payer: COMMERCIAL

## 2019-12-03 ENCOUNTER — ANESTHESIA (OUTPATIENT)
Dept: SURGERY | Facility: HOSPITAL | Age: 64
End: 2019-12-03
Payer: COMMERCIAL

## 2019-12-03 DIAGNOSIS — M71.22 BAKER'S CYST OF KNEE, LEFT: ICD-10-CM

## 2019-12-03 DIAGNOSIS — M94.20 CHONDROMALACIA: ICD-10-CM

## 2019-12-03 DIAGNOSIS — S83.242A ACUTE MEDIAL MENISCUS TEAR OF LEFT KNEE, INITIAL ENCOUNTER: ICD-10-CM

## 2019-12-03 DIAGNOSIS — M95.8 OSTEOCHONDRAL DEFECT OF FEMORAL CONDYLE: ICD-10-CM

## 2019-12-03 DIAGNOSIS — S83.249A MEDIAL MENISCUS TEAR: Primary | ICD-10-CM

## 2019-12-03 DIAGNOSIS — M22.2X2 PATELLOFEMORAL PAIN SYNDROME OF LEFT KNEE: ICD-10-CM

## 2019-12-03 DIAGNOSIS — Z01.818 PRE-OP EXAM: ICD-10-CM

## 2019-12-03 DIAGNOSIS — M70.52 PES ANSERINUS BURSITIS OF LEFT KNEE: ICD-10-CM

## 2019-12-03 LAB — POCT GLUCOSE: 163 MG/DL (ref 70–110)

## 2019-12-03 PROCEDURE — 25000003 PHARM REV CODE 250: Performed by: ORTHOPAEDIC SURGERY

## 2019-12-03 PROCEDURE — 27201423 OPTIME MED/SURG SUP & DEVICES STERILE SUPPLY: Performed by: ORTHOPAEDIC SURGERY

## 2019-12-03 PROCEDURE — 29880 ARTHRS KNE SRG MNISECTMY M&L: CPT | Mod: LT,,, | Performed by: ORTHOPAEDIC SURGERY

## 2019-12-03 PROCEDURE — 25000003 PHARM REV CODE 250: Performed by: NURSE ANESTHETIST, CERTIFIED REGISTERED

## 2019-12-03 PROCEDURE — 71000033 HC RECOVERY, INTIAL HOUR: Performed by: ORTHOPAEDIC SURGERY

## 2019-12-03 PROCEDURE — 36000711: Performed by: ORTHOPAEDIC SURGERY

## 2019-12-03 PROCEDURE — 63600175 PHARM REV CODE 636 W HCPCS: Performed by: ORTHOPAEDIC SURGERY

## 2019-12-03 PROCEDURE — 63600175 PHARM REV CODE 636 W HCPCS: Performed by: NURSE ANESTHETIST, CERTIFIED REGISTERED

## 2019-12-03 PROCEDURE — D9220A PRA ANESTHESIA: Mod: ,,, | Performed by: ANESTHESIOLOGY

## 2019-12-03 PROCEDURE — D9220A PRA ANESTHESIA: ICD-10-PCS | Mod: ,,, | Performed by: ANESTHESIOLOGY

## 2019-12-03 PROCEDURE — S0028 INJECTION, FAMOTIDINE, 20 MG: HCPCS

## 2019-12-03 PROCEDURE — 29880 PR KNEE SCOPE MED/LAT MENISCECTOMY: ICD-10-PCS | Mod: LT,,, | Performed by: ORTHOPAEDIC SURGERY

## 2019-12-03 PROCEDURE — 63600175 PHARM REV CODE 636 W HCPCS: Performed by: ANESTHESIOLOGY

## 2019-12-03 PROCEDURE — 71000015 HC POSTOP RECOV 1ST HR: Performed by: ORTHOPAEDIC SURGERY

## 2019-12-03 PROCEDURE — 93005 ELECTROCARDIOGRAM TRACING: CPT

## 2019-12-03 PROCEDURE — 36000710: Performed by: ORTHOPAEDIC SURGERY

## 2019-12-03 PROCEDURE — 37000008 HC ANESTHESIA 1ST 15 MINUTES: Performed by: ORTHOPAEDIC SURGERY

## 2019-12-03 PROCEDURE — 25000003 PHARM REV CODE 250

## 2019-12-03 PROCEDURE — 37000009 HC ANESTHESIA EA ADD 15 MINS: Performed by: ORTHOPAEDIC SURGERY

## 2019-12-03 PROCEDURE — 25000003 PHARM REV CODE 250: Performed by: ANESTHESIOLOGY

## 2019-12-03 RX ORDER — FAMOTIDINE 10 MG/ML
20 INJECTION INTRAVENOUS 2 TIMES DAILY
Status: DISCONTINUED | OUTPATIENT
Start: 2019-12-03 | End: 2019-12-03 | Stop reason: HOSPADM

## 2019-12-03 RX ORDER — DIPHENHYDRAMINE HYDROCHLORIDE 50 MG/ML
12.5 INJECTION INTRAMUSCULAR; INTRAVENOUS
Status: DISCONTINUED | OUTPATIENT
Start: 2019-12-03 | End: 2019-12-03 | Stop reason: HOSPADM

## 2019-12-03 RX ORDER — SODIUM CHLORIDE 9 MG/ML
INJECTION, SOLUTION INTRAVENOUS CONTINUOUS
Status: DISCONTINUED | OUTPATIENT
Start: 2019-12-03 | End: 2019-12-03 | Stop reason: HOSPADM

## 2019-12-03 RX ORDER — ONDANSETRON 2 MG/ML
4 INJECTION INTRAMUSCULAR; INTRAVENOUS DAILY PRN
Status: DISCONTINUED | OUTPATIENT
Start: 2019-12-03 | End: 2019-12-03 | Stop reason: HOSPADM

## 2019-12-03 RX ORDER — EPINEPHRINE 1 MG/ML
INJECTION INTRAMUSCULAR; INTRAVENOUS; SUBCUTANEOUS
Status: DISCONTINUED | OUTPATIENT
Start: 2019-12-03 | End: 2019-12-03 | Stop reason: HOSPADM

## 2019-12-03 RX ORDER — SODIUM CHLORIDE, SODIUM LACTATE, POTASSIUM CHLORIDE, CALCIUM CHLORIDE 600; 310; 30; 20 MG/100ML; MG/100ML; MG/100ML; MG/100ML
INJECTION, SOLUTION INTRAVENOUS CONTINUOUS
Status: DISCONTINUED | OUTPATIENT
Start: 2019-12-03 | End: 2019-12-03 | Stop reason: HOSPADM

## 2019-12-03 RX ORDER — SODIUM CHLORIDE, SODIUM LACTATE, POTASSIUM CHLORIDE, CALCIUM CHLORIDE 600; 310; 30; 20 MG/100ML; MG/100ML; MG/100ML; MG/100ML
125 INJECTION, SOLUTION INTRAVENOUS CONTINUOUS
Status: DISCONTINUED | OUTPATIENT
Start: 2019-12-03 | End: 2019-12-03 | Stop reason: HOSPADM

## 2019-12-03 RX ORDER — ONDANSETRON 4 MG/1
TABLET, ORALLY DISINTEGRATING ORAL
Status: DISCONTINUED
Start: 2019-12-03 | End: 2019-12-03 | Stop reason: HOSPADM

## 2019-12-03 RX ORDER — CEFAZOLIN SODIUM 2 G/50ML
2 SOLUTION INTRAVENOUS
Status: COMPLETED | OUTPATIENT
Start: 2019-12-03 | End: 2019-12-03

## 2019-12-03 RX ORDER — FAMOTIDINE 10 MG/ML
INJECTION INTRAVENOUS
Status: COMPLETED
Start: 2019-12-03 | End: 2019-12-03

## 2019-12-03 RX ORDER — MORPHINE SULFATE 4 MG/ML
2 INJECTION, SOLUTION INTRAMUSCULAR; INTRAVENOUS EVERY 5 MIN PRN
Status: DISCONTINUED | OUTPATIENT
Start: 2019-12-03 | End: 2019-12-03 | Stop reason: HOSPADM

## 2019-12-03 RX ORDER — CEFAZOLIN SODIUM 2 G/50ML
SOLUTION INTRAVENOUS
Status: COMPLETED
Start: 2019-12-03 | End: 2019-12-03

## 2019-12-03 RX ORDER — ONDANSETRON 2 MG/ML
INJECTION INTRAMUSCULAR; INTRAVENOUS
Status: DISCONTINUED | OUTPATIENT
Start: 2019-12-03 | End: 2019-12-03

## 2019-12-03 RX ORDER — MIDAZOLAM HYDROCHLORIDE 1 MG/ML
INJECTION, SOLUTION INTRAMUSCULAR; INTRAVENOUS
Status: DISCONTINUED | OUTPATIENT
Start: 2019-12-03 | End: 2019-12-03

## 2019-12-03 RX ORDER — GLYCOPYRROLATE 0.2 MG/ML
INJECTION INTRAMUSCULAR; INTRAVENOUS
Status: DISCONTINUED | OUTPATIENT
Start: 2019-12-03 | End: 2019-12-03

## 2019-12-03 RX ORDER — BUPIVACAINE HYDROCHLORIDE AND EPINEPHRINE 2.5; 5 MG/ML; UG/ML
INJECTION, SOLUTION EPIDURAL; INFILTRATION; INTRACAUDAL; PERINEURAL
Status: DISCONTINUED | OUTPATIENT
Start: 2019-12-03 | End: 2019-12-03 | Stop reason: HOSPADM

## 2019-12-03 RX ORDER — MUPIROCIN 20 MG/G
OINTMENT TOPICAL
Status: DISCONTINUED | OUTPATIENT
Start: 2019-12-03 | End: 2019-12-03 | Stop reason: HOSPADM

## 2019-12-03 RX ORDER — PROPOFOL 10 MG/ML
VIAL (ML) INTRAVENOUS
Status: DISCONTINUED | OUTPATIENT
Start: 2019-12-03 | End: 2019-12-03

## 2019-12-03 RX ORDER — LIDOCAINE HYDROCHLORIDE 10 MG/ML
1 INJECTION, SOLUTION EPIDURAL; INFILTRATION; INTRACAUDAL; PERINEURAL ONCE
Status: CANCELLED | OUTPATIENT
Start: 2019-12-03 | End: 2019-12-03

## 2019-12-03 RX ORDER — LIDOCAINE HYDROCHLORIDE AND EPINEPHRINE 10; 10 MG/ML; UG/ML
INJECTION, SOLUTION INFILTRATION; PERINEURAL
Status: DISCONTINUED | OUTPATIENT
Start: 2019-12-03 | End: 2019-12-03 | Stop reason: HOSPADM

## 2019-12-03 RX ORDER — MEPERIDINE HYDROCHLORIDE 50 MG/ML
INJECTION INTRAMUSCULAR; INTRAVENOUS; SUBCUTANEOUS
Status: DISCONTINUED | OUTPATIENT
Start: 2019-12-03 | End: 2019-12-03

## 2019-12-03 RX ORDER — ONDANSETRON 4 MG/1
4 TABLET, ORALLY DISINTEGRATING ORAL ONCE
Status: COMPLETED | OUTPATIENT
Start: 2019-12-03 | End: 2019-12-03

## 2019-12-03 RX ADMIN — ONDANSETRON 4 MG: 2 INJECTION INTRAMUSCULAR; INTRAVENOUS at 08:12

## 2019-12-03 RX ADMIN — FAMOTIDINE 20 MG: 10 INJECTION INTRAVENOUS at 07:12

## 2019-12-03 RX ADMIN — MIDAZOLAM HYDROCHLORIDE 2 MG: 1 INJECTION, SOLUTION INTRAMUSCULAR; INTRAVENOUS at 07:12

## 2019-12-03 RX ADMIN — GLYCOPYRROLATE 0.4 MG: 0.2 INJECTION INTRAMUSCULAR; INTRAVENOUS at 07:12

## 2019-12-03 RX ADMIN — CEFAZOLIN SODIUM 2 G: 2 SOLUTION INTRAVENOUS at 07:12

## 2019-12-03 RX ADMIN — PROPOFOL 200 MG: 10 INJECTION, EMULSION INTRAVENOUS at 07:12

## 2019-12-03 RX ADMIN — MEPERIDINE HYDROCHLORIDE 50 MG: 50 INJECTION INTRAMUSCULAR; INTRAVENOUS; SUBCUTANEOUS at 07:12

## 2019-12-03 RX ADMIN — SODIUM CHLORIDE, POTASSIUM CHLORIDE, SODIUM LACTATE AND CALCIUM CHLORIDE: 600; 310; 30; 20 INJECTION, SOLUTION INTRAVENOUS at 07:12

## 2019-12-03 RX ADMIN — ONDANSETRON 4 MG: 4 TABLET, ORALLY DISINTEGRATING ORAL at 09:12

## 2019-12-03 RX ADMIN — MUPIROCIN: 20 OINTMENT TOPICAL at 07:12

## 2019-12-03 NOTE — PLAN OF CARE
"Awake and alert. Resp even and unlabored. O2 dc'd. Visitor to bedside. Offered PO fluids. "I'm okay right now.  "

## 2019-12-03 NOTE — TRANSFER OF CARE
Anesthesia Transfer of Care Note    Patient: Keesha Quezada    Procedure(s) Performed: Procedure(s) (LRB):  EXCISION, PLICA, KNEE, ARTHROSCOPIC (Left)  ARTHROSCOPY, KNEE, WITH CHONDROPLASTY (Left)    Patient location: PACU    Anesthesia Type: general    Transport from OR: Transported from OR on room air with adequate spontaneous ventilation    Post pain: adequate analgesia    Post assessment: no apparent anesthetic complications and tolerated procedure well    Post vital signs: stable    Level of consciousness: awake, alert and oriented    Nausea/Vomiting: no nausea/vomiting    Complications: none    Transfer of care protocol was followed      Last vitals:   Visit Vitals  BP (!) 145/76   Breastfeeding? No

## 2019-12-03 NOTE — DISCHARGE SUMMARY
Ms. Quezada was admitted through same-day surgery and brought to the operating room where an arthroscopy of her left knee was completed.  For full account of surgery please see the operative report.  Postoperatively the patient was transferred from the operating room to the recovery room and when alert awake and oriented was discharged home with instructions to follow up in 10-12 days for suture removal.

## 2019-12-03 NOTE — DISCHARGE INSTRUCTIONS
[facility]   [phone]  [facility]      [phone]    Managing nausea    Some people have an upset stomach after surgery. This is often because of anesthesia, pain, or pain medicine, or the stress of surgery. These tips will help you handle nausea and eat healthy foods as you get better. If you were on a special food plan before surgery, ask your healthcare provider if you should follow it while you get better. These tips may help:  · Do not push yourself to eat. Your body will tell you when to eat and how much.  · Start off with clear liquids and soup. They are easier to digest.  · Next try semi-solid foods, such as mashed potatoes, applesauce, and gelatin, as you feel ready.  · Slowly move to solid foods. Dont eat fatty, rich, or spicy foods at first.  · Do not force yourself to have 3 large meals a day. Instead eat smaller amounts more often.  · Take pain medicines with a small amount of solid food, such as crackers or toast, to avoid nausea.

## 2019-12-03 NOTE — ANESTHESIA POSTPROCEDURE EVALUATION
Anesthesia Post Evaluation    Patient: Keesha Quezada    Procedure(s) Performed: Procedure(s) (LRB):  EXCISION, PLICA, KNEE, ARTHROSCOPIC (Left)  ARTHROSCOPY, KNEE, WITH CHONDROPLASTY (Left)    Final Anesthesia Type: general    Patient location during evaluation: PACU  Patient participation: Yes- Able to Participate  Level of consciousness: awake and alert  Post-procedure vital signs: reviewed and stable  Pain management: adequate  Airway patency: patent    PONV status at discharge: No PONV  Anesthetic complications: no      Cardiovascular status: blood pressure returned to baseline  Respiratory status: unassisted  Hydration status: euvolemic  Follow-up not needed.          Vitals Value Taken Time   /78 12/3/2019  9:01 AM   Temp 36.7 °C (98 °F) 12/3/2019  9:15 AM   Pulse 105 12/3/2019  9:15 AM   Resp 7 12/3/2019  9:10 AM   SpO2 96 % 12/3/2019  9:15 AM   Vitals shown include unvalidated device data.      Event Time     Out of Recovery 09:14:53          Pain/Guadalupe Score: Guadalupe Score: 9 (12/3/2019  9:00 AM)  Modified Guadalupe Score: 19 (12/3/2019  9:30 AM)

## 2019-12-03 NOTE — OP NOTE
[facility]  Orthopedic Surgery    12/3/2019    Keesha Quezada  [id number]      PREOPERATIVE DIAGNOSIS:   1.  Acute medial meniscus tear of left knee, initial encounter [S83.242A]  2.  Osteochondral defect of femoral condyle [M95.8]  3.  Chondromalacia [M94.20]  4.  Baker's cyst of knee, left [M71.22]  5.  Patellofemoral pain syndrome of left knee [M22.2X2]    POSTOPERATIVE DIAGNOSIS:    1.  Split tear, lateral meniscus, left knee.  2.  Degenerative tear medial meniscus, left knee.  3.  Grade 4 chondromalacia patella, left knee.  4.  Grade 3 chondromalacia medial compartment, left knee.  5.  Plica, left knee.    PROCEDURE:  1.  Arthroscopic partial lateral meniscectomy, left knee.  2.  Arthroscopic partial medial meniscectomy, left knee.  3.  Arthroscopic chondroplasty patella, left knee.  4.  Arthroscopic chondroplasty medial compartment, left knee.  5.  Arthroscopic plica excision, left knee.    SURGEON: Jayme Lucas D.O.    ASSISTANT: Orton Grinnell, CFA.    ANESTHESIA:  General.    BLOOD LOSS:  Less than 5 cc per    TOURNIQUET: N/A.    DRAINS:  None.    PATHOLOGY:  Shavings.    COMPLICATION:  None.    INDICATIONS FOR PROCEDURE:   Ms. Quezada is a 64-year-old female who has had left knee pain since around November 2018 which began after she slipped and fell on a floor in a hotel.  She has completed conservative care which included NSAIDs, bracing, physical therapy, and injections and although her right knee resolved with conservative management her left knee has continued to give her issues.   Going from a seated to a standing position or standing to a seated position still increases her symptoms. She feels her left knee is unstable.  She has swelling and giving way but denied locking.      She elected to proceed with surgery after she failed conservative management and complications to include bleeding, infection, scarring, nerve/blood vessel/tendon damage, need for further surgery, failed surgery,  failure to improve, stiffness, arthritis, and recurrence were discussed.  She signed a consent.    PROCEDURE IN DETAIL:  The patient was brought to the operating room and was transferred to the operating bed where all bony prominences were well padded. General anesthesia was then administered by the Anesthesiology Department.  After general anesthesia was administered a tourniquet was applied to the upper part of the patient's operative extremity, this was not inflated throughout the procedure. The patient's left lower extremity was then prepped with chlorhexidine solution and draped in the normal sterile fashion.  After prepping and draping bony and soft tissue landmarks were palpated and incision sites were drawn on the patient's left knee. The incision sites were then anesthetized with a 50/50 mixture of lidocaine and Marcaine.  The patient's knee was then insufflated with irrigant solution.       Sharp incision was then made at the inferolateral portal site with a #11 followed by introduction of a blunt trocar with cannula.  The trocar was removed and the camera was placed with inflow and outflow.  The patient's knee was then inspected and probed in the normal fashion. The superior pouch was inspected and probed and the patient had a large plica at the superior pouch. The patellofemoral joint was then inspected the patient had grade 4 chondromalacia of the medial patella facet.  Patella tracking was inspected the patient had some minor medial tilt.  The medial shoulder was inspected and no major abnormalities were noted.  The medial gutter was entered and no major abnormalities were noted.       The medial compartment was then entered and a medial portal was established in normal fashion with localization with a 18 gauge spinal needle followed by incision with a #11 blade. A blunt trocar was then utilized to expand the incision.  A probe was placed in the patient's knee and the medial compartment was inspected  and probed.  The medial meniscus had degenerative tearing which was debrided with a full-radius shaver.  The medial meniscus was then probed and did not have  excursion.  The chondral surfaces were inspected and the patient had grade 3 chondromalacia of the femur and tibial plateau.  A chondroplasty was then completed with a full-radius shaver.       The intracondylar notch was then entered and the ACL and PCL were inspected and probed, no major abnormalities were noted.       The lateral compartment was then entered and the lateral meniscus was inspected and probed and there was a split tear of the lateral meniscus.  This was debrided to stable meniscus with a full radius shaver.  The chondral surface were probed and no major abnormalities were noted.       The lateral gutter was then entered and no major abnormalities were noted.  The lateral shoulder was inspected and no major abnormalities were noted.  A superolateral portal was then established in normal fashion with localization with an 18 gauge spinal needle, sharp incision with a #11 blade followed by expansion with a blunt trocar.  A shaver was placed in the superolateral portal and a chondroplasty was completed on the patella.  Following the chondroplasty the plica was excised with the full-radius shaver.  The patient's knee was then copiously irrigated and then evacuated with suction.       The incisions were then closed with nylon suture in a simple interrupted fashion. The patient's incisions were then dressed with Adaptic, sterile gauze, and Ace wrap. The patient was then awakened by anesthesia and transferred from the operating room to the recovery room in stable condition.  She tolerated the procedure well without complication.

## 2019-12-03 NOTE — INTERVAL H&P NOTE
The patient has been examined and the H&P has been reviewed:  Operative extremity signed at 7:10 a.m..  H&P updated at 7:20 a.m..  Patient ready to roll to operating room at 7:20 a.m.    I concur with the findings and no changes have occurred since H&P was written.    Anesthesia/Surgery risks, benefits and alternative options discussed and understood by patient/family.          Active Hospital Problems    Diagnosis  POA    Medial meniscus tear [S83.666A]  Yes      Resolved Hospital Problems   No resolved problems to display.

## 2019-12-03 NOTE — PLAN OF CARE
Has met unit/department guidelines for discharge from each phase of the post procedure continuum. Leaving floor per w/c with RN and . AAO x3. Resp even and unlabored room air. No distress noted. All personal belongings returned to pt.

## 2019-12-04 ENCOUNTER — TELEPHONE (OUTPATIENT)
Dept: ORTHOPEDICS | Facility: CLINIC | Age: 64
End: 2019-12-04

## 2019-12-04 LAB — POCT GLUCOSE: 152 MG/DL (ref 70–110)

## 2019-12-04 NOTE — TELEPHONE ENCOUNTER
Called patient to ask how she is doing after surgery with Dr. Lucas on yesterday and to verify her post op appointment.     Patient voiced that she is doing well after surgery. Post op appointment verified. Encouraged patient to call office if she has any questions or concerns.

## 2019-12-09 VITALS
HEART RATE: 105 BPM | TEMPERATURE: 98 F | SYSTOLIC BLOOD PRESSURE: 123 MMHG | DIASTOLIC BLOOD PRESSURE: 78 MMHG | OXYGEN SATURATION: 96 % | RESPIRATION RATE: 12 BRPM

## 2019-12-13 ENCOUNTER — TELEPHONE (OUTPATIENT)
Dept: ORTHOPEDICS | Facility: CLINIC | Age: 64
End: 2019-12-13

## 2019-12-13 ENCOUNTER — OFFICE VISIT (OUTPATIENT)
Dept: ORTHOPEDICS | Facility: CLINIC | Age: 64
End: 2019-12-13
Payer: COMMERCIAL

## 2019-12-13 VITALS
BODY MASS INDEX: 31.33 KG/M2 | HEIGHT: 65 IN | DIASTOLIC BLOOD PRESSURE: 79 MMHG | HEART RATE: 91 BPM | WEIGHT: 188.06 LBS | SYSTOLIC BLOOD PRESSURE: 128 MMHG

## 2019-12-13 DIAGNOSIS — Z51.89 AFTER CARE: Primary | ICD-10-CM

## 2019-12-13 PROCEDURE — 99024 POSTOP FOLLOW-UP VISIT: CPT | Mod: S$GLB,,, | Performed by: ORTHOPAEDIC SURGERY

## 2019-12-13 PROCEDURE — 99024 PR POST-OP FOLLOW-UP VISIT: ICD-10-PCS | Mod: S$GLB,,, | Performed by: ORTHOPAEDIC SURGERY

## 2019-12-13 PROCEDURE — 99999 PR PBB SHADOW E&M-EST. PATIENT-LVL III: ICD-10-PCS | Mod: PBBFAC,,, | Performed by: ORTHOPAEDIC SURGERY

## 2019-12-13 PROCEDURE — 99999 PR PBB SHADOW E&M-EST. PATIENT-LVL III: CPT | Mod: PBBFAC,,, | Performed by: ORTHOPAEDIC SURGERY

## 2019-12-13 RX ORDER — HYDROCODONE BITARTRATE AND ACETAMINOPHEN 7.5; 325 MG/1; MG/1
1 TABLET ORAL
Refills: 0 | COMMUNITY
Start: 2019-12-03 | End: 2020-10-23

## 2019-12-13 NOTE — TELEPHONE ENCOUNTER
Faxed completed [facility] paperwork to Susana Rivera (human resources) as requested by patient.     Fax numbers: [phone] and [phone].

## 2019-12-13 NOTE — PROGRESS NOTES
Subjective:      Patient ID: Keesha Quezada is a 64 y.o. female.    Chief Complaint: Post-op Evaluation of the Left Knee      HPI: Ms. Quezada returns today for her 1st postop visit on arthroscopy of her left knee. She stated she is relatively pain-free and has already started physical therapy.  Her date of surgery was 12/03/2019 she is now approximately 11 days postop.  She is ambulating well with relatively no pain.  She has not been wearing a brace.    ROS:  New diagnosis/surgery/prescriptions since last office visit on 11/15/2019:  Arthroscopy, left knee.      Objective:      Physical Exam:   General: AAOx3.  No acute distress  Vascular:  Pulses intact and equal bilaterally.  Capillary refill less than 3 seconds and equal bilaterally  Neurologic:  Pinprick and soft touch intact and equal bilaterally  Integment:  No ecchymosis, no errythema.  Incisions well approximated and healing well with sutures in place.  Extremity:  Knee:  Extension/flexion equal bilaterally. Relatively no effusion both knees.  Relatively nontender with knee motion.  Varus/valgus stressing equal bilaterally with endpoint.  Lachman's/drawer equal bilaterally with endpoint.  Relatively no joint line tenderness bilaterally.  Indigo negative both knees.  Joslyn relatively negative both knees.  Radiography:  No x-rays done today.      Assessment:       Impression:  Arthroscopy, left knee.      Plan:       1.  Discussed physical examination and arthroscopic pictures with the patient. Keesha understands that she had a split tear of the lateral meniscus and degenerative fraying of her medial meniscus.  She also understands she had arthritis of her patellofemoral joint medial compartment.  She had partial knee medial and lateral meniscectomies with chondroplasties of the medial compartment and patellofemoral joint.  2.  Remove sutures and place Steri-Strips.  3.  When ambulating she should wear her brace.  4.  Continue with physical therapy.  5.   Home exercises to include quadriceps and hamstring strengthening were discussed.  6.  Any pain can be treated with over-the-counter medications dosed per box instructions.  7.  May return to work with with light duty.  8.  [facility] portal was discussed with the patient and information was given.  The patient was encouraged to use the portal for future encounters.  9.  Follow up in 1 month.  Expect the patient be at maximal medical improvement at next follow-up.  We will refer the patient for an FCE and disability rating to be done just prior to her next follow-up.

## 2020-01-15 ENCOUNTER — OFFICE VISIT (OUTPATIENT)
Dept: ORTHOPEDICS | Facility: CLINIC | Age: 65
End: 2020-01-15
Payer: COMMERCIAL

## 2020-01-15 VITALS
SYSTOLIC BLOOD PRESSURE: 134 MMHG | HEIGHT: 65 IN | DIASTOLIC BLOOD PRESSURE: 83 MMHG | BODY MASS INDEX: 31.33 KG/M2 | HEART RATE: 82 BPM | WEIGHT: 188.06 LBS

## 2020-01-15 DIAGNOSIS — Z98.890 STATUS POST ARTHROSCOPY OF KNEE: Primary | ICD-10-CM

## 2020-01-15 DIAGNOSIS — Z98.890 S/P KNEE SURGERY: Primary | ICD-10-CM

## 2020-01-15 PROCEDURE — 99999 PR PBB SHADOW E&M-EST. PATIENT-LVL III: CPT | Mod: PBBFAC,,, | Performed by: ORTHOPAEDIC SURGERY

## 2020-01-15 PROCEDURE — 99999 PR PBB SHADOW E&M-EST. PATIENT-LVL III: ICD-10-PCS | Mod: PBBFAC,,, | Performed by: ORTHOPAEDIC SURGERY

## 2020-01-15 PROCEDURE — 99024 PR POST-OP FOLLOW-UP VISIT: ICD-10-PCS | Mod: S$GLB,,, | Performed by: ORTHOPAEDIC SURGERY

## 2020-01-15 PROCEDURE — 99024 POSTOP FOLLOW-UP VISIT: CPT | Mod: S$GLB,,, | Performed by: ORTHOPAEDIC SURGERY

## 2020-01-27 ENCOUNTER — CLINICAL SUPPORT (OUTPATIENT)
Dept: REHABILITATION | Facility: HOSPITAL | Age: 65
End: 2020-01-27
Attending: ORTHOPAEDIC SURGERY
Payer: COMMERCIAL

## 2020-01-27 DIAGNOSIS — S83.242A ACUTE MEDIAL MENISCUS TEAR, LEFT, INITIAL ENCOUNTER: Primary | ICD-10-CM

## 2020-01-27 PROCEDURE — 97161 PT EVAL LOW COMPLEX 20 MIN: CPT | Mod: PN

## 2020-01-27 NOTE — PLAN OF CARE
Physical Therapy Evaluation/Plan of Care    Name: Keesha Quezada  Clinic Number: [id number]    Therapy Diagnosis:   Encounter Diagnosis   Name Primary?    Acute medial meniscus tear, left, initial encounter Yes     Physician: Jayme Lucas DO    Physician Orders: PT Eval and Treat   Medical Diagnosis: Left Knee medial/lateral meniscal tear; S/P ATS surgery   Evaluation Date: 2020  Authorization period Expiration: 2020  Plan of Care Certification Period: 2020     Visit #: 1/ Visits authorized: 12  Time In: 3:00 PM   Time Out: 4:10 PM   Total Billable Time: 60 minutes    Precautions: Standard      Subjective   Date of onset: 2018   Date of Surgery: 12/3/2019      PREOPERATIVE DIAGNOSIS:   1.  Acute medial meniscus tear of left knee, initial encounter [S83.242A]  2.  Osteochondral defect of femoral condyle [M95.8]  3.  Chondromalacia [M94.20]  4.  Baker's cyst of knee, left [M71.22]  5.  Patellofemoral pain syndrome of left knee [M22.2X2]     POSTOPERATIVE DIAGNOSIS:    1.  Split tear, lateral meniscus, left knee.  2.  Degenerative tear medial meniscus, left knee.  3.  Grade 4 chondromalacia patella, left knee.  4.  Grade 3 chondromalacia medial compartment, left knee.  5.  Plica, left knee.     PROCEDURE:  1.  Arthroscopic partial lateral meniscectomy, left knee.  2.  Arthroscopic partial medial meniscectomy, left knee.  3.  Arthroscopic chondroplasty patella, left knee.  4.  Arthroscopic chondroplasty medial compartment, left knee.  5.  Arthroscopic plica excision, left knee.         Past Medical History:   Diagnosis Date    Diabetes mellitus     Hyperlipidemia     Hypertension      Keesha Quezada  has a past surgical history that includes  section; Knee arthroscopy w/ plica excision (Left, 12/3/2019); Arthroscopic chondroplasty of knee joint (Left, 12/3/2019); and Knee arthroscopy w/ meniscectomy (Left, 12/3/2019).    Keesha has a current medication list which includes the following  prescription(s): amlodipine, atorvastatin, diclofenac sodium, fluticasone/vilanterol, glimepiride, hydrocodone-acetaminophen, losartan, and meloxicam.    Review of patient's allergies indicates:   Allergen Reactions    Cortisone      Hearing Loss          Prior Therapy: Keesha has already had 12 sessions of Physical Therapy at another PT clinic since her surgery; She is here to complete the reamining 12 visits.   Social History: Patient lives in a single level home with 1 steps to enter   Occupation: Member Outreach Officer for [employer]   Prior Level of Function: Independent prior to injury   Current Level of Function: Back to work fulltime - light duty: no lifting more than 10 lbs; no push-pull more than 20 lbs; Drives to [place] for her work; manages her work load by pulling a rolling cart.     Pain:  Current 1/10, worst 3/10, best 0/10   Location: knee  Left - also notes some achilles pain at insertion on calcaneous - on left   Description: Aching  Aggravating Factors: Sitting, Standing and Getting out of bed/chair  Easing Factors: ice, rest and moving around makes her knee feel better      Onset/EVANS: sudden - slipped and fell in hotel lobby while     Primary concern/ Chief complaints:  History of current condition -Patient states she fell in a Hotel while working in [place] in September 28, 2019. Drove herself back home and went to ER.  Returned to work the following Monday,  Went To PCP - told her to keep doing what she was doing, No improvement in pain in knees - obtained referral to Dr. Lucas.  Saw him on January 11, 2019. She is allergic to cortisone - so no injections given.  She was given bilateral braces at this time.  She saw MD again in February 15 - reported much improvement in Right knee pain, but continued with Left knee pain as well as onset of left heel/achilles pain.  Referred to P.ANNIEand seen for Evaluation on 3/7/2019.  Took over 2 months for approval from W/C for  P.T. Visits - seen again on 5/28/2019 for 12 visits - last one on 7/3/2019.  Keesha continued to have pain, was initially scheduled for surgery on 8/8/2019 - worker's comp did not approve the surgery; finally able to have surgery on her knee on 12/3/2019.  Following surgery she was approved fro 12 physical therapy visits at [facility] which she finished last week.  She was approved for an additional 12 visits at our facility - not sure why this happened this way.    Keesha has returned to work fulltime, but on light duty as noted above. She stated that she wears a brace on her left knee while at work or walking outside and longer distances; does not wear it at home.     Pts goals: To regain full function/strength in my knee so that I can return to my PLOF.     Objective     Observation: Keesha ambulated into the clinic without use of any AD; Observed a normal gait pattern, good pace; Keesha stated that she still had some discomfort in her knee at times, but was doing much better since surgery and some therapy.  Still noting some left achilles pain at times.         Edema: see measurements below; edema unremarkable     Girth Measurement Joint line 5 cm below 10 cm above   Left 41 cm 36 cm 47.5 cm   Right 40.5 cm 36 cm 46.5 cm     Range of Motion:   Knee Left active Left Passive Right Active R passive   Flexion 125 125 125 125   Extension 0 0 0 0       Lower Extremity Strength  Right LE  Left LE    Knee extension: 5/5 Knee extension: 4+/5   Knee flexion: 4+/5 Knee flexion: 4+/5   Hip flexion: 5/5 Hip flexion: 5/5   Hip extension:  5/5 Hip extension: 5/5   Hip abduction: 4+/5 Hip abduction: 4+/5   Hip adduction: 4+/5 Hip adduction 4+/5   Ankle dorsiflexion: 5/5 Ankle dorsiflexion: 5/5   Ankle plantarflexion: 5/5 Ankle plantarflexion: 4/5         Step down test: Unable to perform for Left/LE quad - painful in knee and poor eccentric control of quad; Improved ability to perform on  Right/LE Quad      Function:    - SLS R: x 15 secs   - SLS L: x 10 secs   - Squat: extreme difficulty performing a correct squat - unable to hip hinge to keep knees behind her toes.    - Sit <--> Stand: 7 reps in 30 secs from chair - arms crossed at chest      Joint Mobility: Patellar unrestricted,   Tibiofemoral unrestricted,     Palpation: moderate tenderness to palpation at medial aspect of left and right knee       Sensation: intact to light touch.     Flexibility:    90/90 SLR = R no restriction, L minimal restriction   Ely's test: R no restriction, L minimal restriction   Gely's test: R no restriction, L no restriction   Jr test: R no restriction, L no restriction    PT Evaluation Completed: Yes  Discussed Plan of Care with patient: Yes      TREATMENT     Home Exercises and Patient Education Provided    Education provided re:   - progress towards goals   - role of therapy in multi - disciplinary team, goals for therapy  Pt educated on condition, POC, and expectations in therapy.  No spiritual or educational barriers to learning provided    Home exercises:  Pt will be provided HEP during course of treatment with progressions as appropriate. Pt was advised to perform these exercises free of pain, and to stop performing them if pain occurs.   Keesha demonstrated good  understanding of the education provided.       Functional Limitations Reports - G Codes  Category: Mobility, Body position, Carrying, Self care, Other  Tool: LEFS   Score: 27% limitation     Assessment   Keesha is a 64 y.o. female referred to outpatient physical therapy and presents to PT with s/p left knee ATS for meniscal tear . Patient demonstrates limitations as described in the problem list. Pt will benefit from physcial therapy services in order to maximize pain free and/or functional use of left LE. The following goals were discussed with the patient and patient is in agreement with them as to be addressed in the treatment plan.   Pt prognosis is Good.   Pt  will benefit from skilled outpatient Physical Therapy to address the deficits stated above and in the chart below, provide pt/family education, and to maximize pt's level of independence.     Plan of care discussed with patient: Yes  Pt's spiritual, cultural and educational needs considered and pt agreeable to plan of care and goals as stated below:     Anticipated Barriers for therapy: none    Medical necessity is demonstrated by the following IMPAIRMENTS/PROBLEM LIST:    weakness, impaired functional mobility, impaired balance, decreased lower extremity function and pain    GOALS:   1  Long Term Goals: 6 weeks  Pain: Decrease pain to 1/10 to allow for improved ability to perform daily and recreational activities   Strength: Improve strength in left knee to 5/5 for improved LE stability  ROM: Maintain ROM to 100%  of normal limits   Functional scale: Improve score on LEFS  to <20% limitation   Lifting: Lift 20 lbs to waist level, and carry for 25 feet without pain or compensation  Walking: Increase walking distance/duration to 1 mile without pain  Postures: Increase sitting and/or standing duration to 60 mins without pain   Transfers: Perform all transfers without increased pain or limitation  Exercise: demonstrate independence with home exercise program to maintain gains made in therapy.          Plan   Certification Period: 1/27/2020 to 4/30/2020.    Outpatient physical therapy 2 times weekly to include: Neuromuscular Re-ed, Patient Education and Therapeutic Exercise. Cont PT for 6 weeks.   Pt may be seen by PTA as part of the rehabilitation team.     I certify the need for these services furnished under this plan of treatment and while under my care.    Mone Quezada, PT          Attestation:   I have seen the patient, reviewed the therapist's plan of care, and I agree with the plan of care.   I certify the need for these services furnished under this plan of treatment and while under my care.          _______________            ________                                               _____________________  Physician/Referring Practitioner                                                            Date of Signature

## 2020-01-31 ENCOUNTER — CLINICAL SUPPORT (OUTPATIENT)
Dept: REHABILITATION | Facility: HOSPITAL | Age: 65
End: 2020-01-31
Payer: COMMERCIAL

## 2020-01-31 DIAGNOSIS — S83.242D ACUTE MEDIAL MENISCUS TEAR, LEFT, SUBSEQUENT ENCOUNTER: ICD-10-CM

## 2020-01-31 PROCEDURE — 97110 THERAPEUTIC EXERCISES: CPT | Mod: PN,CQ

## 2020-01-31 NOTE — PROGRESS NOTES
Physical Therapy Daily Note     Name: Keesha Quezada  Clinic Number: [id number]  Diagnosis:   Encounter Diagnosis   Name Primary?    Acute medial meniscus tear, left, subsequent encounter      Physician: Jayme Lucas DO  Precautions: Standard; L Knee ATS  Visit #: 2 of 12  PTA Visit #: 1  Time In: 2:00 PM  Time Out: 3:00 PM     Subjective     Pt reports: No new c/o's.   Pain Scale: Keesha rates pain on a scale of 0-10 to be 2 currently.    Objective     Keesha received individual therapeutic exercises to develop strength and ROM for 45 minutes including:    Recumbent Bike BC level 5 x 20 mins  Heel Raises x 30  Step-Ups(For/Lat) x 30  Side Steps x 3 mins with Yellow TB  Standing Hip Ext/Abd x 20 with Green TB  H/S Curls x 30 with Black TB  Bridges x 20  Ball Squeezes x 3 mins  QS x 3 mins  SLR 10 x 2   SL Hip Abd 10 x 2  SAQ's x 3 mins with small rodríguez roll        Keesha received the following manual therapy techniques:  were applied to the:  for  minutes including:       The patient received the following direct contact modalities after being cleared for contraindications:     The patient received the following supervised modalities after being cleared for contradictions:     Written Home Exercises Provided:   Pt demo good understanding of the education provided. Keesha demonstrated good return demonstration of activities.     Education provided re:  Keesha verbalized good understanding of education provided.   No spiritual or educational barriers to learning provided    Assessment     Patient tolerated exercise pretty well; a little fatigued today; has been on her first work travel trip since her surgery; will continue to progress as tolerated to improve strength and mobility.  This is a 64 y.o. female referred to outpatient physical therapy and presents with a medical diagnosis of s/p left knee ATS for meniscal tear  and demonstrates limitations as described in the  problem list. Pt prognosis is Good. Pt will continue to benefit from skilled outpatient physical therapy to address the deficits listed in the problem list, provide pt/family education and to maximize pt's level of independence in the home and community environment.     LONG TERM GOALS:  6 weeks  Pain: Decrease pain to 1/10 to allow for improved ability to perform daily and recreational activities   Strength: Improve strength in left knee to 5/5 for improved LE stability  ROM: Maintain ROM to 100%  of normal limits   Functional scale: Improve score on LEFS  to <20% limitation   Lifting: Lift 20 lbs to waist level, and carry for 25 feet without pain or compensation  Walking: Increase walking distance/duration to 1 mile without pain  Postures: Increase sitting and/or standing duration to 60 mins without pain   Transfers: Perform all transfers without increased pain or limitation  Exercise: demonstrate independence with home exercise program to maintain gains made in therapy.       Plan     Continue with established Plan of Care towards PT goals.    Therapist: Jonathan Favre, PTA  1/31/2020

## 2020-02-03 ENCOUNTER — CLINICAL SUPPORT (OUTPATIENT)
Dept: REHABILITATION | Facility: HOSPITAL | Age: 65
End: 2020-02-03
Payer: COMMERCIAL

## 2020-02-03 DIAGNOSIS — S83.242D ACUTE MEDIAL MENISCUS TEAR, LEFT, SUBSEQUENT ENCOUNTER: Primary | ICD-10-CM

## 2020-02-03 PROCEDURE — 97110 THERAPEUTIC EXERCISES: CPT | Mod: PN,CQ

## 2020-02-03 NOTE — PROGRESS NOTES
Physical Therapy Daily Note     Name: Keesha Quezada  Clinic Number: [id number]  Diagnosis:   Encounter Diagnosis   Name Primary?    Acute medial meniscus tear, left, subsequent encounter Yes     Physician: Jayme Lucas DO  Precautions: Standard; L Knee ATS  Visit #: 3 of 12  PTA Visit #: 2  Time In: 8:00 AM  Time Out: 9:10 AM     Subjective     Pt reports: No new c/o's.   Pain Scale: Keesha rates pain on a scale of 0-10 to be 2 currently.    Objective     Keesha received individual therapeutic exercises to develop strength and ROM for 45 minutes including:    Recumbent Bike BC level 5 x 10 mins  Heel Raises x 30  Step-Ups(For/Lat) x 30  Side Steps x 3 mins with Yellow TB  Standing Hip Ext/Abd x 20 with Green TB  H/S Curls x 30 with Black TB  Bridges x 20  Wall Squats x 15  Ball Squeezes x 3 mins  QS x 3 mins  SLR 10 x 2   SL Hip Abd 10 x 2  SAQ's x 3 mins with small gray roll  Elliptical level 1 x 5 mins        Keesha received the following manual therapy techniques:  were applied to the:  for  minutes including:       The patient received the following direct contact modalities after being cleared for contraindications:     The patient received the following supervised modalities after being cleared for contradictions:     Written Home Exercises Provided:   Pt demo good understanding of the education provided. Keesha demonstrated good return demonstration of activities.     Education provided re:  Keesha verbalized good understanding of education provided.   No spiritual or educational barriers to learning provided    Assessment     Patient tolerated exercise pretty well; a little fatigued today; has been on her first work travel trip since her surgery; will continue to progress as tolerated to improve strength and mobility.  This is a 64 y.o. female referred to outpatient physical therapy and presents with a medical diagnosis of s/p left knee ATS for meniscal tear  and  demonstrates limitations as described in the problem list. Pt prognosis is Good. Pt will continue to benefit from skilled outpatient physical therapy to address the deficits listed in the problem list, provide pt/family education and to maximize pt's level of independence in the home and community environment.     LONG TERM GOALS:  6 weeks  Pain: Decrease pain to 1/10 to allow for improved ability to perform daily and recreational activities   Strength: Improve strength in left knee to 5/5 for improved LE stability  ROM: Maintain ROM to 100%  of normal limits   Functional scale: Improve score on LEFS  to <20% limitation   Lifting: Lift 20 lbs to waist level, and carry for 25 feet without pain or compensation  Walking: Increase walking distance/duration to 1 mile without pain  Postures: Increase sitting and/or standing duration to 60 mins without pain   Transfers: Perform all transfers without increased pain or limitation  Exercise: demonstrate independence with home exercise program to maintain gains made in therapy.       Plan     Continue with established Plan of Care towards PT goals.    Therapist: Jonathan Favre, PTA  2/3/2020

## 2020-02-06 ENCOUNTER — CLINICAL SUPPORT (OUTPATIENT)
Dept: REHABILITATION | Facility: HOSPITAL | Age: 65
End: 2020-02-06
Payer: COMMERCIAL

## 2020-02-06 DIAGNOSIS — S83.242D ACUTE MEDIAL MENISCUS TEAR, LEFT, SUBSEQUENT ENCOUNTER: Primary | ICD-10-CM

## 2020-02-06 PROCEDURE — 97110 THERAPEUTIC EXERCISES: CPT | Mod: PN,CQ

## 2020-02-06 NOTE — PROGRESS NOTES
Physical Therapy Daily Note     Name: Keesha Quezada  Clinic Number: [id number]  Diagnosis:   Encounter Diagnosis   Name Primary?    Acute medial meniscus tear, left, subsequent encounter Yes     Physician: Jayme Lucas,   Precautions: Standard; L Knee ATS  Visit #: 4 of 12  PTA Visit #: 3  Time In: 8:00 AM  Time Out: 9:05 AM     Subjective     Pt reports: No new c/o's.   Pain Scale: Keesha rates pain on a scale of 0-10 to be 2-3 currently.    Objective     Keesha received individual therapeutic exercises to develop strength and ROM for 45 minutes including:    Recumbent Bike BC level 5 x 15 mins  Heel Raises x 30  Step-Ups(For/Lat) x 30  Side Steps x 3 mins with Yellow TB  Standing Hip Ext/Abd x 20 with Green TB  H/S Curls x 30 with Black TB  Bridges x 20  Wall Squats x 20  Ball Squeezes x 3 mins  QS x 3 mins  SLR 10 x 2   SL Hip Abd 10 x 2  SAQ's x 3 mins with small gray roll  Elliptical level 1 x 5 mins        Keesha received the following manual therapy techniques:  were applied to the:  for  minutes including:       The patient received the following direct contact modalities after being cleared for contraindications:     The patient received the following supervised modalities after being cleared for contradictions:     Written Home Exercises Provided:   Pt demo good understanding of the education provided. Keesha demonstrated good return demonstration of activities.     Education provided re:  Keesha verbalized good understanding of education provided.   No spiritual or educational barriers to learning provided    Assessment     Patient did well with exercises; no increased knee symptoms noted will continue to progress as tolerated to improve strength and mobility.  This is a 64 y.o. female referred to outpatient physical therapy and presents with a medical diagnosis of s/p left knee ATS for meniscal tear  and demonstrates limitations as described in the problem  list. Pt prognosis is Good. Pt will continue to benefit from skilled outpatient physical therapy to address the deficits listed in the problem list, provide pt/family education and to maximize pt's level of independence in the home and community environment.     LONG TERM GOALS:  6 weeks  Pain: Decrease pain to 1/10 to allow for improved ability to perform daily and recreational activities   Strength: Improve strength in left knee to 5/5 for improved LE stability  ROM: Maintain ROM to 100%  of normal limits   Functional scale: Improve score on LEFS  to <20% limitation   Lifting: Lift 20 lbs to waist level, and carry for 25 feet without pain or compensation  Walking: Increase walking distance/duration to 1 mile without pain  Postures: Increase sitting and/or standing duration to 60 mins without pain   Transfers: Perform all transfers without increased pain or limitation  Exercise: demonstrate independence with home exercise program to maintain gains made in therapy.       Plan     Continue with established Plan of Care towards PT goals.    Therapist: Jonathan Favre, PTA  2/6/2020

## 2020-02-10 ENCOUNTER — CLINICAL SUPPORT (OUTPATIENT)
Dept: REHABILITATION | Facility: HOSPITAL | Age: 65
End: 2020-02-10
Payer: COMMERCIAL

## 2020-02-10 DIAGNOSIS — S83.242D ACUTE MEDIAL MENISCUS TEAR, LEFT, SUBSEQUENT ENCOUNTER: Primary | ICD-10-CM

## 2020-02-10 PROCEDURE — 97110 THERAPEUTIC EXERCISES: CPT | Mod: PN,CQ

## 2020-02-10 NOTE — PROGRESS NOTES
Physical Therapy Daily Note     Name: Keesha Quezada  Clinic Number: [id number]  Diagnosis:   Encounter Diagnosis   Name Primary?    Acute medial meniscus tear, left, subsequent encounter Yes     Physician: Jayme Lucas DO  Precautions: Standard; L Knee ATS  Visit #: 5 of 12  PTA Visit #: 4  Time In: 7:55 AM  Time Out: 9:10 AM     Subjective     Pt reports: No new c/o's.   Pain Scale: Keesha rates pain on a scale of 0-10 to be 3 currently.    Objective     Keesha received individual therapeutic exercises to develop strength and ROM for 45 minutes including:    Recumbent Bike BC level 5 x 20 mins  Heel Raises x 30  Step-Ups(For/Lat) x 30  Side Steps x 3 mins with Yellow TB  Standing Hip Ext/Abd x 20 with Green TB  H/S Curls x 30 with Black TB  Bridges x 20  Wall Squats x 20  Ball Squeezes x 3 mins  QS x 3 mins  SLR 10 x 2 with 1.5#  SL Hip Abd 10 x 2 with 1.5#  SAQ's x 3 mins with 1.5#  Elliptical level 1 x 5 mins        Keesha received the following manual therapy techniques:  were applied to the:  for  minutes including:       The patient received the following direct contact modalities after being cleared for contraindications:     The patient received the following supervised modalities after being cleared for contradictions:     Written Home Exercises Provided:   Pt demo good understanding of the education provided. Keesha demonstrated good return demonstration of activities.     Education provided re:  Keesha verbalized good understanding of education provided.   No spiritual or educational barriers to learning provided    Assessment     Patient did well with exercises; no increased knee symptoms noted will continue to progress as tolerated to improve strength and mobility.  This is a 64 y.o. female referred to outpatient physical therapy and presents with a medical diagnosis of s/p left knee ATS for meniscal tear  and demonstrates limitations as described in the  problem list. Pt prognosis is Good. Pt will continue to benefit from skilled outpatient physical therapy to address the deficits listed in the problem list, provide pt/family education and to maximize pt's level of independence in the home and community environment.     LONG TERM GOALS:  6 weeks  Pain: Decrease pain to 1/10 to allow for improved ability to perform daily and recreational activities   Strength: Improve strength in left knee to 5/5 for improved LE stability  ROM: Maintain ROM to 100%  of normal limits   Functional scale: Improve score on LEFS  to <20% limitation   Lifting: Lift 20 lbs to waist level, and carry for 25 feet without pain or compensation  Walking: Increase walking distance/duration to 1 mile without pain  Postures: Increase sitting and/or standing duration to 60 mins without pain   Transfers: Perform all transfers without increased pain or limitation  Exercise: demonstrate independence with home exercise program to maintain gains made in therapy.       Plan     Continue with established Plan of Care towards PT goals.    Therapist: Jonathan Favre, PTA  2/10/2020

## 2020-02-13 ENCOUNTER — CLINICAL SUPPORT (OUTPATIENT)
Dept: REHABILITATION | Facility: HOSPITAL | Age: 65
End: 2020-02-13
Payer: COMMERCIAL

## 2020-02-13 DIAGNOSIS — S83.242D ACUTE MEDIAL MENISCUS TEAR, LEFT, SUBSEQUENT ENCOUNTER: Primary | ICD-10-CM

## 2020-02-13 PROCEDURE — 97110 THERAPEUTIC EXERCISES: CPT | Mod: PN,CQ

## 2020-02-13 NOTE — PROGRESS NOTES
Physical Therapy Daily Note     Name: Keesha Quezada  Clinic Number: [id number]  Diagnosis:   Encounter Diagnosis   Name Primary?    Acute medial meniscus tear, left, subsequent encounter Yes     Physician: Jayme Lucas DO  Precautions: Standard; L Knee ATS  Visit #: 6 of 12  PTA Visit #: 5  Time In: 7:55 AM  Time Out: 9:10 AM     Subjective     Pt reports: No new c/o's.   Pain Scale: Keesha rates pain on a scale of 0-10 to be 2 currently.    Objective     Keesha received individual therapeutic exercises to develop strength and ROM for 45 minutes including:    Recumbent Bike BC level 5 x 20 mins  Heel Raises x 30  Step-Ups(For/Lat) x 30  Side Steps x 3 mins with Yellow TB  Standing Hip Ext/Abd x 20 with Green TB  H/S Curls x 30 with Black TB  Bridges x 20  Wall Squats x 20  Ball Squeezes x 3 mins  QS x 3 mins  SLR 10 x 2 with 1.5#  SL Hip Abd 10 x 2 with 1.5#  SAQ's x 3 mins with 1.5#  Elliptical level 1 x 4 mins        Keesha received the following manual therapy techniques:  were applied to the:  for  minutes including:       The patient received the following direct contact modalities after being cleared for contraindications:     The patient received the following supervised modalities after being cleared for contradictions:     Written Home Exercises Provided:   Pt demo good understanding of the education provided. Keesha demonstrated good return demonstration of activities.     Education provided re:  Keesha verbalized good understanding of education provided.   No spiritual or educational barriers to learning provided    Assessment     Patient did well with exercises; no increased knee symptoms noted will continue to progress as tolerated to improve strength and mobility.  This is a 64 y.o. female referred to outpatient physical therapy and presents with a medical diagnosis of s/p left knee ATS for meniscal tear  and demonstrates limitations as described in the  problem list. Pt prognosis is Good. Pt will continue to benefit from skilled outpatient physical therapy to address the deficits listed in the problem list, provide pt/family education and to maximize pt's level of independence in the home and community environment.     LONG TERM GOALS:  6 weeks  Pain: Decrease pain to 1/10 to allow for improved ability to perform daily and recreational activities   Strength: Improve strength in left knee to 5/5 for improved LE stability  ROM: Maintain ROM to 100%  of normal limits   Functional scale: Improve score on LEFS  to <20% limitation   Lifting: Lift 20 lbs to waist level, and carry for 25 feet without pain or compensation  Walking: Increase walking distance/duration to 1 mile without pain  Postures: Increase sitting and/or standing duration to 60 mins without pain   Transfers: Perform all transfers without increased pain or limitation  Exercise: demonstrate independence with home exercise program to maintain gains made in therapy.       Plan     Continue with established Plan of Care towards PT goals.    Therapist: Jonathan Favre, PTA  2/13/2020

## 2020-02-17 ENCOUNTER — CLINICAL SUPPORT (OUTPATIENT)
Dept: REHABILITATION | Facility: HOSPITAL | Age: 65
End: 2020-02-17
Payer: COMMERCIAL

## 2020-02-17 DIAGNOSIS — S83.242D ACUTE MEDIAL MENISCUS TEAR, LEFT, SUBSEQUENT ENCOUNTER: Primary | ICD-10-CM

## 2020-02-17 PROCEDURE — 97110 THERAPEUTIC EXERCISES: CPT | Mod: PN,CQ

## 2020-02-17 NOTE — PROGRESS NOTES
Physical Therapy Daily Note     Name: Keesha Quezada  Clinic Number: [id number]  Diagnosis:   Encounter Diagnosis   Name Primary?    Acute medial meniscus tear, left, subsequent encounter Yes     Physician: Jayme Lucas,   Precautions: Standard; L Knee ATS  Visit #: 7 of 12  PTA Visit #: 6  Time In: 7:55 AM  Time Out: 9:15 AM     Subjective     Pt reports: My knee kind of gave out on me twice this weekend while going down the steps at home.   Pain Scale: Keesha rates pain on a scale of 0-10 to be 4 currently.    Objective     Keesha received individual therapeutic exercises to develop strength and ROM for 45 minutes including:    Recumbent Bike BC level 5 x 20 mins  Heel Raises x 30  Step-Ups(For/Lat) x 30  Side Steps x 3 mins with Yellow TB  Standing Hip Ext/Abd x 20 with Green TB  H/S Curls x 30 with Black TB  Bridges x 20  Wall Squats x 20  Ball Squeezes x 3 mins  QS x 3 mins  SLR 10 x 2 with 1.5#  SL Hip Abd 10 x 2 with 1.5#  SAQ's x 3 mins with 1.5#  Elliptical level 1 x 4 mins        Keesha received the following manual therapy techniques:  were applied to the:  for  minutes including:       The patient received the following direct contact modalities after being cleared for contraindications:     The patient received the following supervised modalities after being cleared for contradictions:     Written Home Exercises Provided:   Pt demo good understanding of the education provided. Keesha demonstrated good return demonstration of activities.     Education provided re:  Keesha verbalized good understanding of education provided.   No spiritual or educational barriers to learning provided    Assessment     Patient did well with exercises; no increased knee symptoms noted will continue to progress as tolerated to improve strength and mobility.  This is a 64 y.o. female referred to outpatient physical therapy and presents with a medical diagnosis of s/p left knee ATS  for meniscal tear  and demonstrates limitations as described in the problem list. Pt prognosis is Good. Pt will continue to benefit from skilled outpatient physical therapy to address the deficits listed in the problem list, provide pt/family education and to maximize pt's level of independence in the home and community environment.     LONG TERM GOALS:  6 weeks  Pain: Decrease pain to 1/10 to allow for improved ability to perform daily and recreational activities   Strength: Improve strength in left knee to 5/5 for improved LE stability  ROM: Maintain ROM to 100%  of normal limits   Functional scale: Improve score on LEFS  to <20% limitation   Lifting: Lift 20 lbs to waist level, and carry for 25 feet without pain or compensation  Walking: Increase walking distance/duration to 1 mile without pain  Postures: Increase sitting and/or standing duration to 60 mins without pain   Transfers: Perform all transfers without increased pain or limitation  Exercise: demonstrate independence with home exercise program to maintain gains made in therapy.       Plan     Continue with established Plan of Care towards PT goals.    Therapist: Jonathan Favre, PTA  2/17/2020

## 2020-02-21 ENCOUNTER — CLINICAL SUPPORT (OUTPATIENT)
Dept: REHABILITATION | Facility: HOSPITAL | Age: 65
End: 2020-02-21
Payer: COMMERCIAL

## 2020-02-21 DIAGNOSIS — S83.242D ACUTE MEDIAL MENISCUS TEAR, LEFT, SUBSEQUENT ENCOUNTER: Primary | ICD-10-CM

## 2020-02-21 PROCEDURE — 97110 THERAPEUTIC EXERCISES: CPT | Mod: PN,CQ

## 2020-02-21 NOTE — PROGRESS NOTES
Physical Therapy Daily Note     Name: Keesha Quezada  Clinic Number: [id number]  Diagnosis:   Encounter Diagnosis   Name Primary?    Acute medial meniscus tear, left, subsequent encounter Yes     Physician: Jayme Lucas,   Precautions: Standard; L Knee ATS  Visit #: 8 of 12  PTA Visit #: 1  Time In: 7:55 AM  Time Out: 9:15 AM     Subjective     Pt reports: My knee kind of gave out on me twice this weekend while going down the steps at home.   Pain Scale: Keesha rates pain on a scale of 0-10 to be 4 currently.    Objective     Keesha received individual therapeutic exercises to develop strength and ROM for 45 minutes including:    Recumbent Bike BC level 5 x 20 mins  Heel Raises x 30  Step-Ups(For/Lat) x 30  Side Steps x 3 mins with Yellow TB  Standing Hip Ext/Abd x 20 with Green TB  H/S Curls x 30 with Black TB  Bridges x 20  Wall Squats x 20  Ball Squeezes x 3 mins  QS x 3 mins  SLR 10 x 2 with 1.5#  SL Hip Abd 10 x 2 with 1.5#  SAQ's x 3 mins with 1.5#  Elliptical level 1 x 5 mins        Keesha received the following manual therapy techniques:  were applied to the:  for  minutes including:       The patient received the following direct contact modalities after being cleared for contraindications:     The patient received the following supervised modalities after being cleared for contradictions:     Written Home Exercises Provided:   Pt demo good understanding of the education provided. Keesha demonstrated good return demonstration of activities.     Education provided re:  Keesha verbalized good understanding of education provided.   No spiritual or educational barriers to learning provided    Assessment     Patient did well with exercises; no increased knee symptoms noted will continue to progress as tolerated to improve strength and mobility.  This is a 64 y.o. female referred to outpatient physical therapy and presents with a medical diagnosis of s/p left knee ATS  for meniscal tear  and demonstrates limitations as described in the problem list. Pt prognosis is Good. Pt will continue to benefit from skilled outpatient physical therapy to address the deficits listed in the problem list, provide pt/family education and to maximize pt's level of independence in the home and community environment.     LONG TERM GOALS:  6 weeks  Pain: Decrease pain to 1/10 to allow for improved ability to perform daily and recreational activities   Strength: Improve strength in left knee to 5/5 for improved LE stability  ROM: Maintain ROM to 100%  of normal limits   Functional scale: Improve score on LEFS  to <20% limitation   Lifting: Lift 20 lbs to waist level, and carry for 25 feet without pain or compensation  Walking: Increase walking distance/duration to 1 mile without pain  Postures: Increase sitting and/or standing duration to 60 mins without pain   Transfers: Perform all transfers without increased pain or limitation  Exercise: demonstrate independence with home exercise program to maintain gains made in therapy.       Plan     Continue with established Plan of Care towards PT goals.    Therapist: Jonathan Favre, PTA  2/21/2020

## 2020-02-24 ENCOUNTER — CLINICAL SUPPORT (OUTPATIENT)
Dept: REHABILITATION | Facility: HOSPITAL | Age: 65
End: 2020-02-24
Payer: COMMERCIAL

## 2020-02-24 DIAGNOSIS — S83.242D ACUTE MEDIAL MENISCUS TEAR, LEFT, SUBSEQUENT ENCOUNTER: Primary | ICD-10-CM

## 2020-02-24 PROCEDURE — 97110 THERAPEUTIC EXERCISES: CPT | Mod: PN

## 2020-02-24 NOTE — PROGRESS NOTES
Physical Therapy Daily Note     Name: Keesha Quezada  Clinic Number: [id number]  Diagnosis:   Encounter Diagnosis   Name Primary?    Acute medial meniscus tear, left, subsequent encounter Yes     Physician: Jayme Lucas DO  Precautions: Standard; L Knee ATS  Visit #: 9 of 12  PTA Visit #: 1  Time In: 3:00 PM   Time Out: 4:10 PM     Subjective     Pt reports:  Keesha is reporting no new issues with her knee;   Pain Scale: Keesha rates pain on a scale of 0-10 to be 4 currently.    Objective     Keesha received individual therapeutic exercises to develop strength and ROM for 45 minutes including:    Recumbent Bike BC level 5 x 20 mins  Heel Raises x 30  Step-Ups(For/Lat) x 30  Side Steps x 3 mins with Yellow TB  Standing Hip Ext/Abd x 20 with Green TB  H/S Curls x 30 with Black TB  Bridges x 20  Wall Squats x 20  Ball Squeezes x 3 mins  QS x 3 mins  SLR 10 x 2 with 1.5#  SL Hip Abd 10 x 2 with 1.5#  SAQ's x 3 mins with 1.5#  Elliptical level 1 x 5 mins  Vig[employer] - level 10 x 5 mins       Keesha received the following manual therapy techniques:  were applied to the:  for  minutes including:       The patient received the following direct contact modalities after being cleared for contraindications:     The patient received the following supervised modalities after being cleared for contradictions:     Written Home Exercises Provided:   Pt demo good understanding of the education provided. Keesha demonstrated good return demonstration of activities.     Education provided re:  Keesha verbalized good understanding of education provided.   No spiritual or educational barriers to learning provided    Assessment     Patient did well with exercises; no increased knee symptoms noted will continue to progress as tolerated to improve strength and mobility.  This is a 64 y.o. female referred to outpatient physical therapy and presents with a medical diagnosis of s/p left knee ATS for  meniscal tear  and demonstrates limitations as described in the problem list. Pt prognosis is Good. Pt will continue to benefit from skilled outpatient physical therapy to address the deficits listed in the problem list, provide pt/family education and to maximize pt's level of independence in the home and community environment.     LONG TERM GOALS:  6 weeks  Pain: Decrease pain to 1/10 to allow for improved ability to perform daily and recreational activities   Strength: Improve strength in left knee to 5/5 for improved LE stability  ROM: Maintain ROM to 100%  of normal limits   Functional scale: Improve score on LEFS  to <20% limitation   Lifting: Lift 20 lbs to waist level, and carry for 25 feet without pain or compensation  Walking: Increase walking distance/duration to 1 mile without pain  Postures: Increase sitting and/or standing duration to 60 mins without pain   Transfers: Perform all transfers without increased pain or limitation  Exercise: demonstrate independence with home exercise program to maintain gains made in therapy.       Plan     Continue with established Plan of Care towards PT goals.    Therapist: Mone Quezada, PT  2/24/2020

## 2020-02-26 ENCOUNTER — PATIENT MESSAGE (OUTPATIENT)
Dept: ORTHOPEDICS | Facility: CLINIC | Age: 65
End: 2020-02-26

## 2020-02-27 ENCOUNTER — CLINICAL SUPPORT (OUTPATIENT)
Dept: REHABILITATION | Facility: HOSPITAL | Age: 65
End: 2020-02-27
Payer: COMMERCIAL

## 2020-02-27 DIAGNOSIS — S83.242D ACUTE MEDIAL MENISCUS TEAR, LEFT, SUBSEQUENT ENCOUNTER: Primary | ICD-10-CM

## 2020-02-27 PROCEDURE — 97110 THERAPEUTIC EXERCISES: CPT | Mod: PN,CQ

## 2020-02-27 NOTE — PROGRESS NOTES
Physical Therapy Daily Note     Name: Keesha Quezada  Clinic Number: [id number]  Diagnosis:   Encounter Diagnosis   Name Primary?    Acute medial meniscus tear, left, subsequent encounter Yes     Physician: Jayme Lucas,   Precautions: Standard; L Knee ATS  Visit #: 10 of 12  PTA Visit #: 1  Time In: 8:00 AM   Time Out: 9:10 AM     Subjective     Pt reports:  No new c/o's.  Pain Scale: Keesha rates pain on a scale of 0-10 to be 1 currently.    Objective     Keesha received individual therapeutic exercises to develop strength and ROM for 45 minutes including:    Recumbent Bike BC level 5 x 20 mins  Heel Raises x 30  Step-Ups(For/Lat) x 30  Side Steps x 3 mins with Yellow TB  Standing Hip Ext/Abd x 20 with Green TB  H/S Curls x 30 with Black TB  Bridges x 20  Wall Squats x 20  Ball Squeezes x 3 mins  QS x 3 mins  SLR 10 x 2 with 1.5#  SL Hip Abd 10 x 2 with 1.5#  SAQ's x 3 mins with 1.5#  Elliptical level 1 x 5 mins  Vigor Gym - level 10 x 5 mins       Keesha received the following manual therapy techniques:  were applied to the:  for  minutes including:       The patient received the following direct contact modalities after being cleared for contraindications:     The patient received the following supervised modalities after being cleared for contradictions:     Written Home Exercises Provided:   Pt demo good understanding of the education provided. Keesha demonstrated good return demonstration of activities.     Education provided re:  Keesha verbalized good understanding of education provided.   No spiritual or educational barriers to learning provided    Assessment     Patient did well with exercises; no increased knee symptoms noted will continue to progress as tolerated to improve strength and mobility.  This is a 64 y.o. female referred to outpatient physical therapy and presents with a medical diagnosis of s/p left knee ATS for meniscal tear  and demonstrates  limitations as described in the problem list. Pt prognosis is Good. Pt will continue to benefit from skilled outpatient physical therapy to address the deficits listed in the problem list, provide pt/family education and to maximize pt's level of independence in the home and community environment.     LONG TERM GOALS:  6 weeks  Pain: Decrease pain to 1/10 to allow for improved ability to perform daily and recreational activities   Strength: Improve strength in left knee to 5/5 for improved LE stability  ROM: Maintain ROM to 100%  of normal limits   Functional scale: Improve score on LEFS  to <20% limitation   Lifting: Lift 20 lbs to waist level, and carry for 25 feet without pain or compensation  Walking: Increase walking distance/duration to 1 mile without pain  Postures: Increase sitting and/or standing duration to 60 mins without pain   Transfers: Perform all transfers without increased pain or limitation  Exercise: demonstrate independence with home exercise program to maintain gains made in therapy.       Plan     Continue with established Plan of Care towards PT goals.    Therapist: Jonathan Favre, PTA  2/27/2020

## 2020-03-02 ENCOUNTER — CLINICAL SUPPORT (OUTPATIENT)
Dept: REHABILITATION | Facility: HOSPITAL | Age: 65
End: 2020-03-02
Payer: COMMERCIAL

## 2020-03-02 DIAGNOSIS — S83.242D ACUTE MEDIAL MENISCUS TEAR, LEFT, SUBSEQUENT ENCOUNTER: Primary | ICD-10-CM

## 2020-03-02 PROCEDURE — 97110 THERAPEUTIC EXERCISES: CPT | Mod: PN,CQ

## 2020-03-02 NOTE — PROGRESS NOTES
Physical Therapy Daily Note     Name: Keesha Quezada  Clinic Number: [id number]  Diagnosis:   Encounter Diagnosis   Name Primary?    Acute medial meniscus tear, left, subsequent encounter Yes     Physician: Jayme Lucas,   Precautions: Standard; L Knee ATS  Visit #: 11 of 12  PTA Visit #: 2  Time In: 7:55 AM   Time Out: 9:10 AM     Subjective     Pt reports:  No new c/o's.  Pain Scale: Keesha rates pain on a scale of 0-10 to be 1 currently.    Objective     Keesha received individual therapeutic exercises to develop strength and ROM for 45 minutes including:    Recumbent Bike BC level 5 x 20 mins  Heel Raises x 30  Step-Ups(For/Lat) x 30  Side Steps x 3 mins with Yellow TB  Standing Hip Ext/Abd x 20 with Green TB  H/S Curls x 30 with Black TB  Bridges x 20  Wall Squats x 20  Ball Squeezes x 3 mins  QS x 3 mins  SLR 10 x 2 with 1.5#  SL Hip Abd 10 x 2 with 1.5#  SAQ's x 3 mins with 1.5#  Elliptical level 1 x 5 mins  Vigor Gym - level 10 x 5 mins       Keesha received the following manual therapy techniques:  were applied to the:  for  minutes including:       The patient received the following direct contact modalities after being cleared for contraindications:     The patient received the following supervised modalities after being cleared for contradictions:     Written Home Exercises Provided:   Pt demo good understanding of the education provided. Keesha demonstrated good return demonstration of activities.     Education provided re:  Keesha verbalized good understanding of education provided.   No spiritual or educational barriers to learning provided    Assessment     Patient did well with exercises; no increased knee symptoms noted will continue to progress as tolerated to improve strength and mobility.  This is a 64 y.o. female referred to outpatient physical therapy and presents with a medical diagnosis of s/p left knee ATS for meniscal tear  and demonstrates  limitations as described in the problem list. Pt prognosis is Good. Pt will continue to benefit from skilled outpatient physical therapy to address the deficits listed in the problem list, provide pt/family education and to maximize pt's level of independence in the home and community environment.     LONG TERM GOALS:  6 weeks  Pain: Decrease pain to 1/10 to allow for improved ability to perform daily and recreational activities   Strength: Improve strength in left knee to 5/5 for improved LE stability  ROM: Maintain ROM to 100%  of normal limits   Functional scale: Improve score on LEFS  to <20% limitation   Lifting: Lift 20 lbs to waist level, and carry for 25 feet without pain or compensation  Walking: Increase walking distance/duration to 1 mile without pain  Postures: Increase sitting and/or standing duration to 60 mins without pain   Transfers: Perform all transfers without increased pain or limitation  Exercise: demonstrate independence with home exercise program to maintain gains made in therapy.       Plan     Continue with established Plan of Care towards PT goals.    Therapist: Jonathan Favre, PTA  3/2/2020

## 2020-03-09 ENCOUNTER — CLINICAL SUPPORT (OUTPATIENT)
Dept: REHABILITATION | Facility: HOSPITAL | Age: 65
End: 2020-03-09
Payer: COMMERCIAL

## 2020-03-09 DIAGNOSIS — S83.242D ACUTE MEDIAL MENISCUS TEAR, LEFT, SUBSEQUENT ENCOUNTER: Primary | ICD-10-CM

## 2020-03-09 PROCEDURE — 97110 THERAPEUTIC EXERCISES: CPT | Mod: PN,CQ

## 2020-03-09 NOTE — PROGRESS NOTES
Physical Therapy Daily Note     Name: Keesha Quezada  Clinic Number: [id number]  Diagnosis:   Encounter Diagnosis   Name Primary?    Acute medial meniscus tear, left, subsequent encounter Yes     Physician: Jayme Lucas,   Precautions: Standard; L Knee ATS  Visit #: 12 of 12  PTA Visit #: 3  Time In: 7:55 AM   Time Out: 9:10 AM     Subjective     Pt reports:  No new c/o's.  Pain Scale: Keesha rates pain on a scale of 0-10 to be 3 currently.    Objective     Keesha received individual therapeutic exercises to develop strength and ROM for 45 minutes including:    Recumbent Bike BC level 5 x 20 mins  Heel Raises x 30  Step-Ups(For/Lat) x 30  Side Steps x 3 mins with Yellow TB  Standing Hip Ext/Abd x 20 with Green TB  H/S Curls x 30 with Black TB  Bridges x 20  Wall Squats x 20  Ball Squeezes x 3 mins  QS x 3 mins  SLR 10 x 2 with 1.5#  SL Hip Abd 10 x 2 with 1.5#  SAQ's x 3 mins with 1.5#  Elliptical level 1 x 5 mins  Vigor Gym - level 10 x 5 mins       Keesha received the following manual therapy techniques:  were applied to the:  for  minutes including:       The patient received the following direct contact modalities after being cleared for contraindications:     The patient received the following supervised modalities after being cleared for contradictions:     Written Home Exercises Provided:   Pt demo good understanding of the education provided. Keesha demonstrated good return demonstration of activities.     Education provided re:  Keesha verbalized good understanding of education provided.   No spiritual or educational barriers to learning provided    Assessment     Patient did well with exercises; no increased knee symptoms noted will continue to progress as tolerated to improve strength and mobility.  This is a 64 y.o. female referred to outpatient physical therapy and presents with a medical diagnosis of s/p left knee ATS for meniscal tear  and demonstrates  limitations as described in the problem list. Pt prognosis is Good. Pt will continue to benefit from skilled outpatient physical therapy to address the deficits listed in the problem list, provide pt/family education and to maximize pt's level of independence in the home and community environment.     LONG TERM GOALS:  6 weeks  Pain: Decrease pain to 1/10 to allow for improved ability to perform daily and recreational activities   Strength: Improve strength in left knee to 5/5 for improved LE stability  ROM: Maintain ROM to 100%  of normal limits   Functional scale: Improve score on LEFS  to <20% limitation   Lifting: Lift 20 lbs to waist level, and carry for 25 feet without pain or compensation  Walking: Increase walking distance/duration to 1 mile without pain  Postures: Increase sitting and/or standing duration to 60 mins without pain   Transfers: Perform all transfers without increased pain or limitation  Exercise: demonstrate independence with home exercise program to maintain gains made in therapy.       Plan     Patient to see MD this week and will await word for further treatment.     Therapist: Jonathan Favre, PTA  3/9/2020

## 2020-03-13 ENCOUNTER — OFFICE VISIT (OUTPATIENT)
Dept: ORTHOPEDICS | Facility: CLINIC | Age: 65
End: 2020-03-13
Payer: COMMERCIAL

## 2020-03-13 VITALS — WEIGHT: 188 LBS | BODY MASS INDEX: 31.32 KG/M2 | HEIGHT: 65 IN | HEART RATE: 80 BPM

## 2020-03-13 DIAGNOSIS — Z98.890 S/P KNEE SURGERY: ICD-10-CM

## 2020-03-13 DIAGNOSIS — M70.52 PES ANSERINUS BURSITIS OF LEFT KNEE: Primary | ICD-10-CM

## 2020-03-13 DIAGNOSIS — M76.60 ACHILLES TENDON PAIN: ICD-10-CM

## 2020-03-13 PROCEDURE — 99999 PR PBB SHADOW E&M-EST. PATIENT-LVL III: ICD-10-PCS | Mod: PBBFAC,,, | Performed by: ORTHOPAEDIC SURGERY

## 2020-03-13 PROCEDURE — 99213 PR OFFICE/OUTPT VISIT, EST, LEVL III, 20-29 MIN: ICD-10-PCS | Mod: S$GLB,,, | Performed by: ORTHOPAEDIC SURGERY

## 2020-03-13 PROCEDURE — 99213 OFFICE O/P EST LOW 20 MIN: CPT | Mod: S$GLB,,, | Performed by: ORTHOPAEDIC SURGERY

## 2020-03-13 PROCEDURE — 99999 PR PBB SHADOW E&M-EST. PATIENT-LVL III: CPT | Mod: PBBFAC,,, | Performed by: ORTHOPAEDIC SURGERY

## 2020-03-13 NOTE — PROGRESS NOTES
Subjective:      Patient ID: Keesha Quezada is a 64 y.o. female.    Chief Complaint: Post-op Evaluation of the Left Knee and Knee Pain (left knee sx 12/3/2019)      HPI: Ms. Quezada returns today for follow-up on arthroscopy of her left knee.  Her date of surgery 12/03/2019.  She is now over 3 months postop.  She has been going to physical therapy and wearing a brace.  She stated that she now has increasing pain in her knee and she is back to preop levels with her pain.  She stated when she takes her brace off her knee feels unstable.  She also still has Achilles tenderness.  She has not been wearing a Cam walker.    ROS:  No new diagnosis/surgery/prescriptions since last office visit on 01/15/2020.  Constitution: Negative for chills and fever.   HENT: Negative for congestion.    Eyes: Negative for blurred vision.   Cardiovascular: Negative for chest pain.   Respiratory: Negative for cough.    Endocrine: Negative for polydipsia.   Hematologic/Lymphatic: Does not bruise/bleed easily.   Skin: Negative for itching.   Musculoskeletal: Positive for joint pain. Negative for gout.   Gastrointestinal: Positive for heartburn. Negative for constipation and diarrhea.   Genitourinary: Negative for nocturia.   Neurological: Negative for headaches and seizures.   Psychiatric/Behavioral: Negative for substance abuse.   Allergic/Immunologic: Positive for environmental allergies.       Objective:      Physical Exam:   General: AAOx3.  No acute distress  Vascular:  Pulses intact and equal bilaterally.  Capillary refill less than 3 seconds and equal bilaterally  Neurologic:  Pinprick and soft touch intact and equal bilaterally  Integment:  No ecchymosis, no errythema.  Incisions well healed.  Extremity:  Knee:  Extension/flexion equal bilaterally 0/128 degrees.  Minimal crepitus with motion both knees.  No effusion either knee.  Negative patellar load/compression both knees.  Varus/valgus stressing equal bilaterally with endpoint.   Lachman's/drawer equal bilaterally with endpoint.  Medial joint line tenderness left knee.  Indigo negative both knees.  [place] negative both knees.  Tender at the anserine insertion left knee.  No swelling at the anserine insertion left knee.  Radiography:  No new x-rays done today.      Assessment:       Impression:   1.  Pes anserine bursitis, left knee.  2.  Arthroscopy, left knee.  3.  Left Achilles pain.      Plan:       1.  Discussed physical examination  with the patient. Keesha understands that she continues to complain about left knee pain she has been treated conservatively and surgically and at this point if her knee pain has not resolved then she may want to consider a total knee arthroplasty.  She understands this the final definitive care otherwise if she does not want proceed with a total knee arthroplasty she has met maximal medical improvement with respect to her knee and is time to refer for an FCE and disability rating.  Since she has chronic Achilles pain which is recalcitrant to conservative management and there does not appear to be any further issues with her Achilles will refer to orthopedic foot and ankle surgery to evaluate and treat her.  2.  Refer to orthopedic foot and ankle surgery.  3.  Patient has met maximal medical improvement with respect to her knee.  4.  Wear brace as needed.  5.  Refer for an FCE/disability rating.  6.  Patient is workman's comp form was completed placing her at light duty until her FCE is completed.  Final permanent restrictions will be applied once the FCE and disability rating are completed.  7.  Offered a steroid injection to the pes anserine insertion she declined.    8.  Offered to perform a total knee arthroplasty on the patient she declined.  9.  [facility] portal was discussed with the patient and information was given.  The patient was encouraged to use the portal for future encounters.  10.  Follow up after FCE and disability rating are  completed.

## 2020-05-28 ENCOUNTER — OFFICE VISIT (OUTPATIENT)
Dept: ORTHOPEDICS | Facility: CLINIC | Age: 65
End: 2020-05-28
Payer: COMMERCIAL

## 2020-05-28 VITALS
RESPIRATION RATE: 18 BRPM | HEART RATE: 103 BPM | BODY MASS INDEX: 31.33 KG/M2 | OXYGEN SATURATION: 98 % | TEMPERATURE: 98 F | WEIGHT: 188.06 LBS | HEIGHT: 65 IN

## 2020-05-28 DIAGNOSIS — M70.52 PES ANSERINUS BURSITIS OF LEFT KNEE: Primary | ICD-10-CM

## 2020-05-28 DIAGNOSIS — Z98.890 S/P KNEE SURGERY: ICD-10-CM

## 2020-05-28 PROCEDURE — 99213 PR OFFICE/OUTPT VISIT, EST, LEVL III, 20-29 MIN: ICD-10-PCS | Mod: S$GLB,,, | Performed by: ORTHOPAEDIC SURGERY

## 2020-05-28 PROCEDURE — 99999 PR PBB SHADOW E&M-EST. PATIENT-LVL III: CPT | Mod: PBBFAC,,, | Performed by: ORTHOPAEDIC SURGERY

## 2020-05-28 PROCEDURE — 99213 OFFICE O/P EST LOW 20 MIN: CPT | Mod: S$GLB,,, | Performed by: ORTHOPAEDIC SURGERY

## 2020-05-28 PROCEDURE — 99999 PR PBB SHADOW E&M-EST. PATIENT-LVL III: ICD-10-PCS | Mod: PBBFAC,,, | Performed by: ORTHOPAEDIC SURGERY

## 2020-05-28 NOTE — PROGRESS NOTES
Subjective:      Patient ID: Keesha Quezada is a 64 y.o. female.    Chief Complaint: Results (FCE and Disability Rating)      HPI: Ms. Quezada  returned today for final follow-up on an injury to her left knee of which she underwent  Arthroscopy with partial medial and lateral meniscectomies and chondroplasty on 12/03/2019.  She was treated with physical therapy postoperatively and on 03/13/2020 she met maximal medical improvement with respect to her left knee.  She was also forwarded for an FCE /disability rating.    ROS:   No new diagnosis/ surgery/ prescriptions since last office visit on 03/13/2020.  Constitution: Negative for chills and fever.   HENT: Negative for congestion.    Eyes: Negative for blurred vision.   Cardiovascular: Negative for chest pain.   Respiratory: Negative for cough.    Endocrine: Negative for polydipsia.   Hematologic/Lymphatic: Does not bruise/bleed easily.   Skin: Negative for itching.   Musculoskeletal: Positive for joint pain. Negative for gout.   Gastrointestinal: Positive for heartburn. Negative for constipation and diarrhea.   Genitourinary: Negative for nocturia.   Neurological: Negative for headaches and seizures.   Psychiatric/Behavioral: Negative for substance abuse.   Allergic/Immunologic: Positive for environmental allergies.       Objective:      Physical Exam:   General: AAOx3.  No acute distress  Vascular:  Pulses intact and equal bilaterally.  Capillary refill less than 3 seconds and equal bilaterally  Neurologic:  Pinprick and soft touch intact and equal bilaterally  Integment:  No ecchymosis, no errythema .  Incisions well healed.  Extremity: Knee:  Extension/flexion equal bilaterally 0/128 degrees.  Minimal crepitus with motion both knees.  No effusion either knee.  Negative patellar load/compression both knees.  Varus/valgus stressing equal bilaterally with endpoint.  Lachman's/drawer equal bilaterally with endpoint.  Medial joint line tenderness left knee.  Indigo  negative both knees.  [place] negative both knees.  Tender at the anserine insertion left knee.  No swelling at the anserine insertion left knee.  Radiography:    No new x-rays done today.  Functional capacity exam:  FCE completed on 05/12/2020 through [facility] assigned Ms. Quezada the ability to perform sedentary work with lifting/ caring restrictions limited to 20 lb and no kneeling or squatting.  Disability rating:  Disability rating completed on 05/12/2020 through [facility] assigned Ms. Quezada a lower extremity impairment of 10% and a whole body impairment of 4%.      Assessment:       Impression:   1.  History of arthroscopy, left knee  2. History of pes anserine bursitis, left knee.      Plan:       1.  Discussed physical examination and  FCE/disability rating with the patient. Keesha understands that she has been assign permanent work restrictions which include sedentary work with no lifting greater than 20 lb and no work which requires kneeling or crouching.  2. Patient is workman's comp form was completed giving her permanent work restrictions which include sedentary work with no lifting greater than 20 lb in no work requiring kneeling or crouching.    3. If she has any residual pain and can be treated with over-the-counter medications dosed per box instructions.    4. Patient met maximal medical improvement at last visit on 03/13/2020.    5. [facility] portal was discussed with the patient and information was given.  The patient was encouraged to use the portal for future encounters.  6. Follow up p.r.n..

## 2020-10-12 ENCOUNTER — TELEPHONE (OUTPATIENT)
Dept: FAMILY MEDICINE | Facility: CLINIC | Age: 65
End: 2020-10-12

## 2020-10-12 NOTE — TELEPHONE ENCOUNTER
----- Message from Nba Preciado sent at 10/12/2020 11:37 AM CDT -----  Regarding: Appointment  Contact: Patient  Patient want to know if office can please fit her in before November 19th for Annual, patient will be running out of Blood Pressure and cholesterol meds please call back at [phone]    Case number  [id number]

## 2020-10-23 ENCOUNTER — OFFICE VISIT (OUTPATIENT)
Dept: FAMILY MEDICINE | Facility: CLINIC | Age: 65
End: 2020-10-23
Payer: COMMERCIAL

## 2020-10-23 VITALS
WEIGHT: 188 LBS | TEMPERATURE: 99 F | DIASTOLIC BLOOD PRESSURE: 79 MMHG | OXYGEN SATURATION: 95 % | RESPIRATION RATE: 19 BRPM | BODY MASS INDEX: 31.32 KG/M2 | HEART RATE: 77 BPM | HEIGHT: 65 IN | SYSTOLIC BLOOD PRESSURE: 132 MMHG

## 2020-10-23 DIAGNOSIS — R73.03 PREDIABETES: ICD-10-CM

## 2020-10-23 DIAGNOSIS — E78.49 OTHER HYPERLIPIDEMIA: ICD-10-CM

## 2020-10-23 DIAGNOSIS — I10 ESSENTIAL HYPERTENSION: Primary | ICD-10-CM

## 2020-10-23 PROCEDURE — 99999 PR PBB SHADOW E&M-EST. PATIENT-LVL III: CPT | Mod: PBBFAC,,, | Performed by: FAMILY MEDICINE

## 2020-10-23 PROCEDURE — 1101F PT FALLS ASSESS-DOCD LE1/YR: CPT | Mod: CPTII,S$GLB,, | Performed by: FAMILY MEDICINE

## 2020-10-23 PROCEDURE — 1159F PR MEDICATION LIST DOCUMENTED IN MEDICAL RECORD: ICD-10-PCS | Mod: S$GLB,,, | Performed by: FAMILY MEDICINE

## 2020-10-23 PROCEDURE — 99204 OFFICE O/P NEW MOD 45 MIN: CPT | Mod: S$GLB,,, | Performed by: FAMILY MEDICINE

## 2020-10-23 PROCEDURE — 1126F PR PAIN SEVERITY QUANTIFIED, NO PAIN PRESENT: ICD-10-PCS | Mod: S$GLB,,, | Performed by: FAMILY MEDICINE

## 2020-10-23 PROCEDURE — 3008F PR BODY MASS INDEX (BMI) DOCUMENTED: ICD-10-PCS | Mod: CPTII,S$GLB,, | Performed by: FAMILY MEDICINE

## 2020-10-23 PROCEDURE — 99999 PR PBB SHADOW E&M-EST. PATIENT-LVL III: ICD-10-PCS | Mod: PBBFAC,,, | Performed by: FAMILY MEDICINE

## 2020-10-23 PROCEDURE — 99204 PR OFFICE/OUTPT VISIT, NEW, LEVL IV, 45-59 MIN: ICD-10-PCS | Mod: S$GLB,,, | Performed by: FAMILY MEDICINE

## 2020-10-23 PROCEDURE — 3008F BODY MASS INDEX DOCD: CPT | Mod: CPTII,S$GLB,, | Performed by: FAMILY MEDICINE

## 2020-10-23 PROCEDURE — 1101F PR PT FALLS ASSESS DOC 0-1 FALLS W/OUT INJ PAST YR: ICD-10-PCS | Mod: CPTII,S$GLB,, | Performed by: FAMILY MEDICINE

## 2020-10-23 PROCEDURE — 1159F MED LIST DOCD IN RCRD: CPT | Mod: S$GLB,,, | Performed by: FAMILY MEDICINE

## 2020-10-23 PROCEDURE — 1126F AMNT PAIN NOTED NONE PRSNT: CPT | Mod: S$GLB,,, | Performed by: FAMILY MEDICINE

## 2020-10-23 RX ORDER — GUAIFENESIN/DEXTROMETHORPHAN 100-10MG/5
5 SYRUP ORAL EVERY 4 HOURS PRN
Qty: 473 ML | Refills: 2 | Status: SHIPPED | OUTPATIENT
Start: 2020-10-23 | End: 2022-10-12

## 2020-10-23 RX ORDER — BENZONATATE 200 MG/1
200 CAPSULE ORAL 3 TIMES DAILY PRN
Status: CANCELLED | OUTPATIENT
Start: 2020-10-23 | End: 2020-11-02

## 2020-10-23 RX ORDER — AMLODIPINE BESYLATE 5 MG/1
5 TABLET ORAL DAILY
Qty: 90 TABLET | Refills: 3 | Status: SHIPPED | OUTPATIENT
Start: 2020-10-23 | End: 2021-08-05

## 2020-10-23 RX ORDER — GLIMEPIRIDE 2 MG/1
2 TABLET ORAL
Qty: 90 TABLET | Refills: 3 | Status: SHIPPED | OUTPATIENT
Start: 2020-10-23 | End: 2021-09-14 | Stop reason: SDUPTHER

## 2020-10-23 RX ORDER — LOSARTAN POTASSIUM 50 MG/1
50 TABLET ORAL DAILY
Qty: 90 TABLET | Refills: 3 | Status: SHIPPED | OUTPATIENT
Start: 2020-10-23 | End: 2020-12-01 | Stop reason: SDUPTHER

## 2020-10-23 RX ORDER — ALBUTEROL SULFATE 90 UG/1
2 AEROSOL, METERED RESPIRATORY (INHALATION) EVERY 6 HOURS PRN
Qty: 18 G | Refills: 5 | Status: SHIPPED | OUTPATIENT
Start: 2020-10-23 | End: 2023-10-17 | Stop reason: SDUPTHER

## 2020-10-23 RX ORDER — ATORVASTATIN CALCIUM 40 MG/1
40 TABLET, FILM COATED ORAL DAILY
Qty: 90 TABLET | Refills: 3 | Status: SHIPPED | OUTPATIENT
Start: 2020-10-23 | End: 2021-08-05

## 2020-10-23 NOTE — PROGRESS NOTES
Subjective:       Patient ID: Keesha Quezada is a 65 y.o. female.    Chief Complaint: Establish Care    In regard to the patient's diabetes, patient reports that blood sugars have been well controlled. Patient Denies hypoglycemia. Patient is not currently on insulin therapy. Patient is on ACE/ARB and is on statin. Last a1c was No results found for: HGBA1C    In regards to the patient's dyslipidemia, patient reports compliance with medication regimen without side effects.    In regards to the patient's hypertension, patient denies chest pain/sob, and reports compliance with medication regimen.    Review of Systems   Constitutional: Negative for activity change, appetite change, chills, fatigue and fever.   HENT: Negative for congestion, dental problem, facial swelling, nosebleeds, postnasal drip, sinus pain, sore throat, trouble swallowing and voice change.    Eyes: Negative for pain, discharge and visual disturbance.   Respiratory: Negative for apnea, cough, chest tightness and shortness of breath.    Cardiovascular: Negative for chest pain and palpitations.   Gastrointestinal: Negative for abdominal pain, blood in stool, constipation and nausea.   Endocrine: Negative for cold intolerance, polydipsia and polyuria.   Genitourinary: Negative for difficulty urinating, enuresis and flank pain.   Musculoskeletal: Negative for arthralgias and back pain.   Skin: Negative for color change.   Allergic/Immunologic: Negative for environmental allergies and immunocompromised state.   Neurological: Negative for dizziness and light-headedness.   Hematological: Negative for adenopathy.   Psychiatric/Behavioral: Negative for agitation, behavioral problems, decreased concentration and dysphoric mood. The patient is not nervous/anxious.    All other systems reviewed and are negative.        Reviewed family, medical, surgical, and social history.    Objective:      /79 (BP Location: Left arm, Patient Position: Sitting, BP Method:  "Medium (Automatic))   Pulse 77   Temp 99.2 °F (37.3 °C) (Tympanic)   Resp 19   Ht 5' 5" (1.651 m)   Wt 85.3 kg (188 lb)   SpO2 95%   BMI 31.28 kg/m²   Physical Exam  Vitals signs and nursing note reviewed.   Constitutional:       General: She is not in acute distress.     Appearance: She is well-developed. She is not diaphoretic.   HENT:      Head: Normocephalic and atraumatic.      Nose: Nose normal.      Mouth/Throat:      Pharynx: No oropharyngeal exudate.   Eyes:      General: No scleral icterus.     Conjunctiva/sclera: Conjunctivae normal.      Pupils: Pupils are equal, round, and reactive to light.   Neck:      Musculoskeletal: Normal range of motion and neck supple.      Thyroid: No thyromegaly.   Cardiovascular:      Rate and Rhythm: Normal rate and regular rhythm.      Heart sounds: Normal heart sounds. No murmur. No friction rub. No gallop.    Pulmonary:      Effort: Pulmonary effort is normal. No respiratory distress.      Breath sounds: Normal breath sounds. No wheezing or rales.   Chest:      Chest wall: No tenderness.   Abdominal:      General: Bowel sounds are normal. There is no distension.      Palpations: Abdomen is soft.      Tenderness: There is no abdominal tenderness. There is no guarding.   Musculoskeletal: Normal range of motion.         General: No tenderness or deformity.   Lymphadenopathy:      Cervical: No cervical adenopathy.   Skin:     General: Skin is warm and dry.      Coloration: Skin is not pale.      Findings: No erythema or rash.   Neurological:      Mental Status: She is alert and oriented to person, place, and time.      Cranial Nerves: No cranial nerve deficit.      Sensory: No sensory deficit.      Motor: No abnormal muscle tone.      Deep Tendon Reflexes: Reflexes normal.   Psychiatric:         Behavior: Behavior normal.         Thought Content: Thought content normal.         Judgment: Judgment normal.         Assessment:       1. Essential hypertension    2. Other " hyperlipidemia    3. Prediabetes        Plan:       Essential hypertension  -     amLODIPine (NORVASC) 5 MG tablet; Take 1 tablet (5 mg total) by mouth once daily.  Dispense: 90 tablet; Refill: 3  -     losartan (COZAAR) 50 MG tablet; Take 1 tablet (50 mg total) by mouth once daily.  Dispense: 90 tablet; Refill: 3  -     CBC auto differential; Future; Expected date: 10/23/2020  -     Comprehensive Metabolic Panel; Future; Expected date: 10/23/2020  -     Microalbumin/Creatinine Ratio, Urine; Future  -     Lipid Panel; Future; Expected date: 10/23/2020  -     TSH; Future; Expected date: 10/23/2020  -     T4, Free; Future; Expected date: 10/23/2020  -     Hemoglobin A1C; Future; Expected date: 10/23/2020    Other hyperlipidemia  -     atorvastatin (LIPITOR) 40 MG tablet; Take 1 tablet (40 mg total) by mouth once daily.  Dispense: 90 tablet; Refill: 3  -     CBC auto differential; Future; Expected date: 10/23/2020  -     Comprehensive Metabolic Panel; Future; Expected date: 10/23/2020  -     Microalbumin/Creatinine Ratio, Urine; Future  -     Lipid Panel; Future; Expected date: 10/23/2020  -     TSH; Future; Expected date: 10/23/2020  -     T4, Free; Future; Expected date: 10/23/2020  -     Hemoglobin A1C; Future; Expected date: 10/23/2020    Prediabetes  -     glimepiride (AMARYL) 2 MG tablet; Take 1 tablet (2 mg total) by mouth before breakfast.  Dispense: 90 tablet; Refill: 3  -     CBC auto differential; Future; Expected date: 10/23/2020  -     Comprehensive Metabolic Panel; Future; Expected date: 10/23/2020  -     Microalbumin/Creatinine Ratio, Urine; Future  -     Lipid Panel; Future; Expected date: 10/23/2020  -     TSH; Future; Expected date: 10/23/2020  -     T4, Free; Future; Expected date: 10/23/2020  -     Hemoglobin A1C; Future; Expected date: 10/23/2020    Other orders  -     albuterol (VENTOLIN HFA) 90 mcg/actuation inhaler; Inhale 2 puffs into the lungs every 6 (six) hours as needed for Wheezing. Rescue   Dispense: 18 g; Refill: 5  -     dextromethorphan-guaifenesin  mg/5 ml (ROBITUSSIN-DM)  mg/5 mL liquid; Take 5 mLs by mouth every 4 (four) hours as needed.  Dispense: 473 mL; Refill: 2            Risks, benefits, and side effects were discussed with the patient. All questions were answered to the fullest satisfaction of the patient, and pt verbalized understanding and agreement to treatment plan. Pt was to call with any new or worsening symptoms, or present to the ER.

## 2020-10-27 ENCOUNTER — LAB VISIT (OUTPATIENT)
Dept: LAB | Facility: HOSPITAL | Age: 65
End: 2020-10-27
Attending: FAMILY MEDICINE
Payer: COMMERCIAL

## 2020-10-27 DIAGNOSIS — R73.03 PREDIABETES: ICD-10-CM

## 2020-10-27 DIAGNOSIS — I10 ESSENTIAL HYPERTENSION: ICD-10-CM

## 2020-10-27 DIAGNOSIS — E78.49 OTHER HYPERLIPIDEMIA: ICD-10-CM

## 2020-10-27 LAB
ALBUMIN SERPL BCP-MCNC: 4.3 G/DL (ref 3.5–5.2)
ALP SERPL-CCNC: 99 U/L (ref 55–135)
ALT SERPL W/O P-5'-P-CCNC: 32 U/L (ref 10–44)
ANION GAP SERPL CALC-SCNC: 9 MMOL/L (ref 8–16)
AST SERPL-CCNC: 27 U/L (ref 10–40)
BASOPHILS # BLD AUTO: 0.03 K/UL (ref 0–0.2)
BASOPHILS NFR BLD: 0.4 % (ref 0–1.9)
BILIRUB SERPL-MCNC: 0.7 MG/DL (ref 0.1–1)
BUN SERPL-MCNC: 12 MG/DL (ref 8–23)
CALCIUM SERPL-MCNC: 9.5 MG/DL (ref 8.7–10.5)
CHLORIDE SERPL-SCNC: 107 MMOL/L (ref 95–110)
CHOLEST SERPL-MCNC: 178 MG/DL (ref 120–199)
CHOLEST/HDLC SERPL: 3.5 {RATIO} (ref 2–5)
CO2 SERPL-SCNC: 27 MMOL/L (ref 23–29)
CREAT SERPL-MCNC: 0.9 MG/DL (ref 0.5–1.4)
DIFFERENTIAL METHOD: ABNORMAL
EOSINOPHIL # BLD AUTO: 0.1 K/UL (ref 0–0.5)
EOSINOPHIL NFR BLD: 0.7 % (ref 0–8)
ERYTHROCYTE [DISTWIDTH] IN BLOOD BY AUTOMATED COUNT: 13 % (ref 11.5–14.5)
EST. GFR  (AFRICAN AMERICAN): >60 ML/MIN/1.73 M^2
EST. GFR  (NON AFRICAN AMERICAN): >60 ML/MIN/1.73 M^2
ESTIMATED AVG GLUCOSE: 140 MG/DL (ref 68–131)
GLUCOSE SERPL-MCNC: 161 MG/DL (ref 70–110)
HBA1C MFR BLD HPLC: 6.5 % (ref 4.5–6.2)
HCT VFR BLD AUTO: 42.9 % (ref 37–48.5)
HDLC SERPL-MCNC: 51 MG/DL (ref 40–75)
HDLC SERPL: 28.7 % (ref 20–50)
HGB BLD-MCNC: 13.5 G/DL (ref 12–16)
IMM GRANULOCYTES # BLD AUTO: 0.02 K/UL (ref 0–0.04)
IMM GRANULOCYTES NFR BLD AUTO: 0.2 % (ref 0–0.5)
LDLC SERPL CALC-MCNC: 95.2 MG/DL (ref 63–159)
LYMPHOCYTES # BLD AUTO: 2.5 K/UL (ref 1–4.8)
LYMPHOCYTES NFR BLD: 29.1 % (ref 18–48)
MCH RBC QN AUTO: 28.3 PG (ref 27–31)
MCHC RBC AUTO-ENTMCNC: 31.5 G/DL (ref 32–36)
MCV RBC AUTO: 90 FL (ref 82–98)
MONOCYTES # BLD AUTO: 0.5 K/UL (ref 0.3–1)
MONOCYTES NFR BLD: 5.6 % (ref 4–15)
NEUTROPHILS # BLD AUTO: 5.4 K/UL (ref 1.8–7.7)
NEUTROPHILS NFR BLD: 64 % (ref 38–73)
NONHDLC SERPL-MCNC: 127 MG/DL
NRBC BLD-RTO: 0 /100 WBC
PLATELET # BLD AUTO: 270 K/UL (ref 150–350)
PMV BLD AUTO: 9.9 FL (ref 9.2–12.9)
POTASSIUM SERPL-SCNC: 4.6 MMOL/L (ref 3.5–5.1)
PROT SERPL-MCNC: 7.7 G/DL (ref 6–8.4)
RBC # BLD AUTO: 4.77 M/UL (ref 4–5.4)
SODIUM SERPL-SCNC: 143 MMOL/L (ref 136–145)
T4 FREE SERPL-MCNC: 1.04 NG/DL (ref 0.71–1.51)
TRIGL SERPL-MCNC: 159 MG/DL (ref 30–150)
TSH SERPL DL<=0.005 MIU/L-ACNC: 1.73 UIU/ML (ref 0.34–5.6)
WBC # BLD AUTO: 8.5 K/UL (ref 3.9–12.7)

## 2020-10-27 PROCEDURE — 85025 COMPLETE CBC W/AUTO DIFF WBC: CPT

## 2020-10-27 PROCEDURE — 83036 HEMOGLOBIN GLYCOSYLATED A1C: CPT

## 2020-10-27 PROCEDURE — 80053 COMPREHEN METABOLIC PANEL: CPT

## 2020-10-27 PROCEDURE — 84439 ASSAY OF FREE THYROXINE: CPT

## 2020-10-27 PROCEDURE — 80061 LIPID PANEL: CPT

## 2020-10-27 PROCEDURE — 84443 ASSAY THYROID STIM HORMONE: CPT

## 2020-10-27 PROCEDURE — 36415 COLL VENOUS BLD VENIPUNCTURE: CPT

## 2020-10-28 ENCOUNTER — TELEPHONE (OUTPATIENT)
Dept: FAMILY MEDICINE | Facility: CLINIC | Age: 65
End: 2020-10-28

## 2020-10-28 NOTE — TELEPHONE ENCOUNTER
----- Message from Buzz Lyman MD sent at 10/28/2020  7:10 AM CDT -----  Please let this patient know that their results were in the acceptable range.

## 2020-10-29 DIAGNOSIS — Z12.11 COLON CANCER SCREENING: ICD-10-CM

## 2020-10-29 DIAGNOSIS — Z12.31 OTHER SCREENING MAMMOGRAM: ICD-10-CM

## 2020-11-02 ENCOUNTER — PATIENT MESSAGE (OUTPATIENT)
Dept: FAMILY MEDICINE | Facility: CLINIC | Age: 65
End: 2020-11-02

## 2020-12-01 DIAGNOSIS — I10 ESSENTIAL HYPERTENSION: ICD-10-CM

## 2020-12-01 RX ORDER — LOSARTAN POTASSIUM 50 MG/1
50 TABLET ORAL DAILY
Qty: 90 TABLET | Refills: 3 | Status: SHIPPED | OUTPATIENT
Start: 2020-12-01 | End: 2021-10-01 | Stop reason: SDUPTHER

## 2021-01-04 ENCOUNTER — PATIENT MESSAGE (OUTPATIENT)
Dept: ADMINISTRATIVE | Facility: HOSPITAL | Age: 66
End: 2021-01-04

## 2021-04-07 ENCOUNTER — PATIENT MESSAGE (OUTPATIENT)
Dept: ADMINISTRATIVE | Facility: HOSPITAL | Age: 66
End: 2021-04-07

## 2021-04-08 DIAGNOSIS — Z11.59 NEED FOR HEPATITIS C SCREENING TEST: ICD-10-CM

## 2021-09-14 ENCOUNTER — PATIENT MESSAGE (OUTPATIENT)
Dept: FAMILY MEDICINE | Facility: CLINIC | Age: 66
End: 2021-09-14

## 2021-09-14 DIAGNOSIS — R73.03 PREDIABETES: ICD-10-CM

## 2021-09-15 RX ORDER — GLIMEPIRIDE 2 MG/1
2 TABLET ORAL
Qty: 90 TABLET | Refills: 3 | Status: SHIPPED | OUTPATIENT
Start: 2021-09-15 | End: 2021-12-07

## 2021-10-01 ENCOUNTER — OFFICE VISIT (OUTPATIENT)
Dept: FAMILY MEDICINE | Facility: CLINIC | Age: 66
End: 2021-10-01
Payer: COMMERCIAL

## 2021-10-01 VITALS
SYSTOLIC BLOOD PRESSURE: 122 MMHG | HEIGHT: 65 IN | HEART RATE: 86 BPM | OXYGEN SATURATION: 96 % | BODY MASS INDEX: 29.27 KG/M2 | WEIGHT: 175.69 LBS | RESPIRATION RATE: 16 BRPM | DIASTOLIC BLOOD PRESSURE: 77 MMHG

## 2021-10-01 DIAGNOSIS — I10 ESSENTIAL HYPERTENSION: ICD-10-CM

## 2021-10-01 DIAGNOSIS — Z00.00 ANNUAL PHYSICAL EXAM: Primary | ICD-10-CM

## 2021-10-01 DIAGNOSIS — Z12.11 COLON CANCER SCREENING: ICD-10-CM

## 2021-10-01 DIAGNOSIS — R73.03 PREDIABETES: ICD-10-CM

## 2021-10-01 PROCEDURE — 3074F PR MOST RECENT SYSTOLIC BLOOD PRESSURE < 130 MM HG: ICD-10-PCS | Mod: CPTII,S$GLB,, | Performed by: FAMILY MEDICINE

## 2021-10-01 PROCEDURE — 1159F PR MEDICATION LIST DOCUMENTED IN MEDICAL RECORD: ICD-10-PCS | Mod: CPTII,S$GLB,, | Performed by: FAMILY MEDICINE

## 2021-10-01 PROCEDURE — 3074F SYST BP LT 130 MM HG: CPT | Mod: CPTII,S$GLB,, | Performed by: FAMILY MEDICINE

## 2021-10-01 PROCEDURE — 3078F PR MOST RECENT DIASTOLIC BLOOD PRESSURE < 80 MM HG: ICD-10-PCS | Mod: CPTII,S$GLB,, | Performed by: FAMILY MEDICINE

## 2021-10-01 PROCEDURE — 3008F PR BODY MASS INDEX (BMI) DOCUMENTED: ICD-10-PCS | Mod: CPTII,S$GLB,, | Performed by: FAMILY MEDICINE

## 2021-10-01 PROCEDURE — 1159F MED LIST DOCD IN RCRD: CPT | Mod: CPTII,S$GLB,, | Performed by: FAMILY MEDICINE

## 2021-10-01 PROCEDURE — 3078F DIAST BP <80 MM HG: CPT | Mod: CPTII,S$GLB,, | Performed by: FAMILY MEDICINE

## 2021-10-01 PROCEDURE — 4010F ACE/ARB THERAPY RXD/TAKEN: CPT | Mod: CPTII,S$GLB,, | Performed by: FAMILY MEDICINE

## 2021-10-01 PROCEDURE — 99397 PER PM REEVAL EST PAT 65+ YR: CPT | Mod: S$GLB,,, | Performed by: FAMILY MEDICINE

## 2021-10-01 PROCEDURE — 99397 PR PREVENTIVE VISIT,EST,65 & OVER: ICD-10-PCS | Mod: S$GLB,,, | Performed by: FAMILY MEDICINE

## 2021-10-01 PROCEDURE — 99999 PR PBB SHADOW E&M-EST. PATIENT-LVL III: ICD-10-PCS | Mod: PBBFAC,,, | Performed by: FAMILY MEDICINE

## 2021-10-01 PROCEDURE — 99999 PR PBB SHADOW E&M-EST. PATIENT-LVL III: CPT | Mod: PBBFAC,,, | Performed by: FAMILY MEDICINE

## 2021-10-01 PROCEDURE — 4010F PR ACE/ARB THEARPY RXD/TAKEN: ICD-10-PCS | Mod: CPTII,S$GLB,, | Performed by: FAMILY MEDICINE

## 2021-10-01 PROCEDURE — 3008F BODY MASS INDEX DOCD: CPT | Mod: CPTII,S$GLB,, | Performed by: FAMILY MEDICINE

## 2021-10-01 RX ORDER — PROMETHAZINE HYDROCHLORIDE AND DEXTROMETHORPHAN HYDROBROMIDE 6.25; 15 MG/5ML; MG/5ML
5 SYRUP ORAL EVERY 6 HOURS PRN
Qty: 240 ML | Refills: 1 | Status: SHIPPED | OUTPATIENT
Start: 2021-10-01 | End: 2021-10-01 | Stop reason: SDUPTHER

## 2021-10-01 RX ORDER — PROMETHAZINE HYDROCHLORIDE AND DEXTROMETHORPHAN HYDROBROMIDE 6.25; 15 MG/5ML; MG/5ML
5 SYRUP ORAL EVERY 6 HOURS PRN
Qty: 240 ML | Refills: 1 | Status: SHIPPED | OUTPATIENT
Start: 2021-10-01 | End: 2022-10-12 | Stop reason: SDUPTHER

## 2021-10-01 RX ORDER — BENZONATATE 100 MG/1
100 CAPSULE ORAL 3 TIMES DAILY PRN
Qty: 90 CAPSULE | Refills: 3 | Status: SHIPPED | OUTPATIENT
Start: 2021-10-01 | End: 2023-10-17

## 2021-10-01 RX ORDER — CHLORHEXIDINE GLUCONATE ORAL RINSE 1.2 MG/ML
SOLUTION DENTAL
COMMUNITY
Start: 2021-08-24 | End: 2023-10-17

## 2021-10-01 RX ORDER — BENZONATATE 100 MG/1
100 CAPSULE ORAL 3 TIMES DAILY PRN
Qty: 90 CAPSULE | Refills: 3 | Status: SHIPPED | OUTPATIENT
Start: 2021-10-01 | End: 2021-10-01 | Stop reason: SDUPTHER

## 2021-10-01 RX ORDER — LOSARTAN POTASSIUM 50 MG/1
50 TABLET ORAL DAILY
Qty: 90 TABLET | Refills: 3 | Status: SHIPPED | OUTPATIENT
Start: 2021-10-01 | End: 2022-10-18

## 2021-10-05 ENCOUNTER — LAB VISIT (OUTPATIENT)
Dept: LAB | Facility: HOSPITAL | Age: 66
End: 2021-10-05
Attending: FAMILY MEDICINE
Payer: COMMERCIAL

## 2021-10-05 DIAGNOSIS — R73.03 PREDIABETES: ICD-10-CM

## 2021-10-05 LAB
ALBUMIN SERPL BCP-MCNC: 4.1 G/DL (ref 3.5–5.2)
ALP SERPL-CCNC: 120 U/L (ref 55–135)
ALT SERPL W/O P-5'-P-CCNC: 34 U/L (ref 10–44)
ANION GAP SERPL CALC-SCNC: 12 MMOL/L (ref 8–16)
AST SERPL-CCNC: 27 U/L (ref 10–40)
BASOPHILS # BLD AUTO: 0.03 K/UL (ref 0–0.2)
BASOPHILS NFR BLD: 0.3 % (ref 0–1.9)
BILIRUB SERPL-MCNC: 0.6 MG/DL (ref 0.1–1)
BUN SERPL-MCNC: 10 MG/DL (ref 8–23)
CALCIUM SERPL-MCNC: 9.8 MG/DL (ref 8.7–10.5)
CHLORIDE SERPL-SCNC: 107 MMOL/L (ref 95–110)
CHOLEST SERPL-MCNC: 176 MG/DL (ref 120–199)
CHOLEST/HDLC SERPL: 3.8 {RATIO} (ref 2–5)
CO2 SERPL-SCNC: 20 MMOL/L (ref 23–29)
CREAT SERPL-MCNC: 0.8 MG/DL (ref 0.5–1.4)
DIFFERENTIAL METHOD: NORMAL
EOSINOPHIL # BLD AUTO: 0 K/UL (ref 0–0.5)
EOSINOPHIL NFR BLD: 0.5 % (ref 0–8)
ERYTHROCYTE [DISTWIDTH] IN BLOOD BY AUTOMATED COUNT: 12.7 % (ref 11.5–14.5)
EST. GFR  (AFRICAN AMERICAN): >60 ML/MIN/1.73 M^2
EST. GFR  (NON AFRICAN AMERICAN): >60 ML/MIN/1.73 M^2
ESTIMATED AVG GLUCOSE: 137 MG/DL (ref 68–131)
GLUCOSE SERPL-MCNC: 148 MG/DL (ref 70–110)
HBA1C MFR BLD: 6.4 % (ref 4–5.6)
HCT VFR BLD AUTO: 43.2 % (ref 37–48.5)
HDLC SERPL-MCNC: 46 MG/DL (ref 40–75)
HDLC SERPL: 26.1 % (ref 20–50)
HGB BLD-MCNC: 14.1 G/DL (ref 12–16)
IMM GRANULOCYTES # BLD AUTO: 0.02 K/UL (ref 0–0.04)
IMM GRANULOCYTES NFR BLD AUTO: 0.2 % (ref 0–0.5)
LDLC SERPL CALC-MCNC: 96.8 MG/DL (ref 63–159)
LYMPHOCYTES # BLD AUTO: 2.7 K/UL (ref 1–4.8)
LYMPHOCYTES NFR BLD: 30.8 % (ref 18–48)
MCH RBC QN AUTO: 28.6 PG (ref 27–31)
MCHC RBC AUTO-ENTMCNC: 32.6 G/DL (ref 32–36)
MCV RBC AUTO: 88 FL (ref 82–98)
MONOCYTES # BLD AUTO: 0.5 K/UL (ref 0.3–1)
MONOCYTES NFR BLD: 5.4 % (ref 4–15)
NEUTROPHILS # BLD AUTO: 5.5 K/UL (ref 1.8–7.7)
NEUTROPHILS NFR BLD: 62.8 % (ref 38–73)
NONHDLC SERPL-MCNC: 130 MG/DL
NRBC BLD-RTO: 0 /100 WBC
PLATELET # BLD AUTO: 259 K/UL (ref 150–450)
PMV BLD AUTO: 9.6 FL (ref 9.2–12.9)
POTASSIUM SERPL-SCNC: 3.8 MMOL/L (ref 3.5–5.1)
PROT SERPL-MCNC: 7.8 G/DL (ref 6–8.4)
RBC # BLD AUTO: 4.93 M/UL (ref 4–5.4)
SODIUM SERPL-SCNC: 139 MMOL/L (ref 136–145)
T4 FREE SERPL-MCNC: 1.04 NG/DL (ref 0.71–1.51)
TRIGL SERPL-MCNC: 166 MG/DL (ref 30–150)
TSH SERPL DL<=0.005 MIU/L-ACNC: 1.18 UIU/ML (ref 0.4–4)
WBC # BLD AUTO: 8.76 K/UL (ref 3.9–12.7)

## 2021-10-05 PROCEDURE — 84443 ASSAY THYROID STIM HORMONE: CPT | Performed by: FAMILY MEDICINE

## 2021-10-05 PROCEDURE — 84439 ASSAY OF FREE THYROXINE: CPT | Performed by: FAMILY MEDICINE

## 2021-10-05 PROCEDURE — 36415 COLL VENOUS BLD VENIPUNCTURE: CPT | Performed by: FAMILY MEDICINE

## 2021-10-05 PROCEDURE — 80061 LIPID PANEL: CPT | Performed by: FAMILY MEDICINE

## 2021-10-05 PROCEDURE — 80053 COMPREHEN METABOLIC PANEL: CPT | Performed by: FAMILY MEDICINE

## 2021-10-05 PROCEDURE — 85025 COMPLETE CBC W/AUTO DIFF WBC: CPT | Performed by: FAMILY MEDICINE

## 2021-10-05 PROCEDURE — 83036 HEMOGLOBIN GLYCOSYLATED A1C: CPT | Performed by: FAMILY MEDICINE

## 2021-10-13 ENCOUNTER — PATIENT OUTREACH (OUTPATIENT)
Dept: ADMINISTRATIVE | Facility: HOSPITAL | Age: 66
End: 2021-10-13

## 2021-10-30 LAB — NONINV COLON CA DNA+OCC BLD SCRN STL QL: NEGATIVE

## 2021-11-09 ENCOUNTER — PATIENT MESSAGE (OUTPATIENT)
Dept: ADMINISTRATIVE | Facility: HOSPITAL | Age: 66
End: 2021-11-09
Payer: COMMERCIAL

## 2021-12-09 ENCOUNTER — PATIENT MESSAGE (OUTPATIENT)
Dept: FAMILY MEDICINE | Facility: CLINIC | Age: 66
End: 2021-12-09
Payer: COMMERCIAL

## 2022-01-05 DIAGNOSIS — Z12.31 OTHER SCREENING MAMMOGRAM: ICD-10-CM

## 2022-01-10 ENCOUNTER — PATIENT MESSAGE (OUTPATIENT)
Dept: ADMINISTRATIVE | Facility: HOSPITAL | Age: 67
End: 2022-01-10
Payer: COMMERCIAL

## 2022-04-04 ENCOUNTER — PATIENT MESSAGE (OUTPATIENT)
Dept: ADMINISTRATIVE | Facility: HOSPITAL | Age: 67
End: 2022-04-04
Payer: COMMERCIAL

## 2022-05-12 ENCOUNTER — PATIENT MESSAGE (OUTPATIENT)
Dept: ADMINISTRATIVE | Facility: HOSPITAL | Age: 67
End: 2022-05-12
Payer: COMMERCIAL

## 2022-05-12 ENCOUNTER — PATIENT OUTREACH (OUTPATIENT)
Dept: ADMINISTRATIVE | Facility: HOSPITAL | Age: 67
End: 2022-05-12
Payer: COMMERCIAL

## 2022-05-31 ENCOUNTER — PATIENT MESSAGE (OUTPATIENT)
Dept: ADMINISTRATIVE | Facility: HOSPITAL | Age: 67
End: 2022-05-31
Payer: COMMERCIAL

## 2022-06-09 DIAGNOSIS — Z11.59 NEED FOR HEPATITIS C SCREENING TEST: ICD-10-CM

## 2022-08-24 ENCOUNTER — PATIENT MESSAGE (OUTPATIENT)
Dept: ADMINISTRATIVE | Facility: HOSPITAL | Age: 67
End: 2022-08-24
Payer: COMMERCIAL

## 2022-08-31 DIAGNOSIS — Z78.0 MENOPAUSE: ICD-10-CM

## 2022-10-06 DIAGNOSIS — I10 ESSENTIAL HYPERTENSION: ICD-10-CM

## 2022-10-11 ENCOUNTER — PATIENT MESSAGE (OUTPATIENT)
Dept: ADMINISTRATIVE | Facility: HOSPITAL | Age: 67
End: 2022-10-11
Payer: COMMERCIAL

## 2022-10-12 ENCOUNTER — OFFICE VISIT (OUTPATIENT)
Dept: FAMILY MEDICINE | Facility: CLINIC | Age: 67
End: 2022-10-12
Payer: COMMERCIAL

## 2022-10-12 VITALS
SYSTOLIC BLOOD PRESSURE: 125 MMHG | RESPIRATION RATE: 17 BRPM | HEIGHT: 65 IN | HEART RATE: 103 BPM | WEIGHT: 171.38 LBS | BODY MASS INDEX: 28.55 KG/M2 | OXYGEN SATURATION: 98 % | DIASTOLIC BLOOD PRESSURE: 82 MMHG

## 2022-10-12 DIAGNOSIS — Z00.00 ANNUAL PHYSICAL EXAM: Primary | ICD-10-CM

## 2022-10-12 DIAGNOSIS — R73.03 PREDIABETES: ICD-10-CM

## 2022-10-12 PROCEDURE — 3079F PR MOST RECENT DIASTOLIC BLOOD PRESSURE 80-89 MM HG: ICD-10-PCS | Mod: CPTII,S$GLB,, | Performed by: FAMILY MEDICINE

## 2022-10-12 PROCEDURE — 3074F SYST BP LT 130 MM HG: CPT | Mod: CPTII,S$GLB,, | Performed by: FAMILY MEDICINE

## 2022-10-12 PROCEDURE — 1160F RVW MEDS BY RX/DR IN RCRD: CPT | Mod: CPTII,S$GLB,, | Performed by: FAMILY MEDICINE

## 2022-10-12 PROCEDURE — 3008F BODY MASS INDEX DOCD: CPT | Mod: CPTII,S$GLB,, | Performed by: FAMILY MEDICINE

## 2022-10-12 PROCEDURE — 4010F PR ACE/ARB THEARPY RXD/TAKEN: ICD-10-PCS | Mod: CPTII,S$GLB,, | Performed by: FAMILY MEDICINE

## 2022-10-12 PROCEDURE — 4010F ACE/ARB THERAPY RXD/TAKEN: CPT | Mod: CPTII,S$GLB,, | Performed by: FAMILY MEDICINE

## 2022-10-12 PROCEDURE — 3079F DIAST BP 80-89 MM HG: CPT | Mod: CPTII,S$GLB,, | Performed by: FAMILY MEDICINE

## 2022-10-12 PROCEDURE — 1160F PR REVIEW ALL MEDS BY PRESCRIBER/CLIN PHARMACIST DOCUMENTED: ICD-10-PCS | Mod: CPTII,S$GLB,, | Performed by: FAMILY MEDICINE

## 2022-10-12 PROCEDURE — 99999 PR PBB SHADOW E&M-EST. PATIENT-LVL III: ICD-10-PCS | Mod: PBBFAC,,, | Performed by: FAMILY MEDICINE

## 2022-10-12 PROCEDURE — 3074F PR MOST RECENT SYSTOLIC BLOOD PRESSURE < 130 MM HG: ICD-10-PCS | Mod: CPTII,S$GLB,, | Performed by: FAMILY MEDICINE

## 2022-10-12 PROCEDURE — 1159F MED LIST DOCD IN RCRD: CPT | Mod: CPTII,S$GLB,, | Performed by: FAMILY MEDICINE

## 2022-10-12 PROCEDURE — 99397 PER PM REEVAL EST PAT 65+ YR: CPT | Mod: S$GLB,,, | Performed by: FAMILY MEDICINE

## 2022-10-12 PROCEDURE — 1159F PR MEDICATION LIST DOCUMENTED IN MEDICAL RECORD: ICD-10-PCS | Mod: CPTII,S$GLB,, | Performed by: FAMILY MEDICINE

## 2022-10-12 PROCEDURE — 3008F PR BODY MASS INDEX (BMI) DOCUMENTED: ICD-10-PCS | Mod: CPTII,S$GLB,, | Performed by: FAMILY MEDICINE

## 2022-10-12 PROCEDURE — 99999 PR PBB SHADOW E&M-EST. PATIENT-LVL III: CPT | Mod: PBBFAC,,, | Performed by: FAMILY MEDICINE

## 2022-10-12 PROCEDURE — 99397 PR PREVENTIVE VISIT,EST,65 & OVER: ICD-10-PCS | Mod: S$GLB,,, | Performed by: FAMILY MEDICINE

## 2022-10-12 RX ORDER — PROMETHAZINE HYDROCHLORIDE AND DEXTROMETHORPHAN HYDROBROMIDE 6.25; 15 MG/5ML; MG/5ML
5 SYRUP ORAL EVERY 6 HOURS PRN
Qty: 240 ML | Refills: 1 | Status: SHIPPED | OUTPATIENT
Start: 2022-10-12 | End: 2023-01-30

## 2022-10-12 NOTE — PROGRESS NOTES
" Patient ID: Keesha Quezada is a 67 y.o. female.    Chief Complaint: Annual Exam and Medication Refill      Reviewed family, medical, surgical, and social history.    No cp/sob  No change in mental status  No fever  No asymmetrical limb swelling    Objective:      /82 (BP Location: Right arm, Patient Position: Sitting, BP Method: Medium (Manual))   Pulse 103   Resp 17   Ht 5' 5" (1.651 m)   Wt 77.7 kg (171 lb 6.4 oz)   SpO2 98%   BMI 28.52 kg/m²   RRR, CTAB, s/nt/nd, no c/c/e, non-toxic appearing, no focal weakness  Assessment:       1. Annual physical exam    2. Prediabetes        Plan:       Annual physical exam  -     CBC Auto Differential; Future; Expected date: 10/12/2022  -     Comprehensive Metabolic Panel; Future; Expected date: 10/12/2022  -     Hemoglobin A1C; Future; Expected date: 10/12/2022  -     Lipid Panel; Future; Expected date: 10/12/2022  -     TSH; Future; Expected date: 10/12/2022  -     T4, Free; Future; Expected date: 10/12/2022    Prediabetes  -     CBC Auto Differential; Future; Expected date: 10/12/2022  -     Comprehensive Metabolic Panel; Future; Expected date: 10/12/2022  -     Hemoglobin A1C; Future; Expected date: 10/12/2022  -     Lipid Panel; Future; Expected date: 10/12/2022  -     TSH; Future; Expected date: 10/12/2022  -     T4, Free; Future; Expected date: 10/12/2022    Other orders  -     promethazine-dextromethorphan (PROMETHAZINE-DM) 6.25-15 mg/5 mL Syrp; Take 5 mLs by mouth every 6 (six) hours as needed (cough).  Dispense: 240 mL; Refill: 1            Continue current medicines, any changes noted above  Labs, radiology studies, and procedures/notes from the last 3 months were reviewed.    Risks, benefits, and side effects were discussed with the patient. All questions were answered to the fullest satisfaction of the patient, and pt verbalized understanding and agreement to treatment plan. Pt was to call with any new or worsening symptoms, or present to the ER.      "

## 2022-10-28 LAB
CHOLEST SERPL-MSCNC: 181 MG/DL (ref 0–200)
HBA1C MFR BLD: 6.2 % (ref 4–6)
HDLC SERPL-MCNC: 52 MG/DL
LDLC SERPL CALC-MCNC: 107 MG/DL
TRIGL SERPL-MCNC: 121 MG/DL

## 2022-11-10 ENCOUNTER — PATIENT OUTREACH (OUTPATIENT)
Dept: ADMINISTRATIVE | Facility: HOSPITAL | Age: 67
End: 2022-11-10
Payer: COMMERCIAL

## 2022-11-10 NOTE — PROGRESS NOTES
Records Received, hyper-linked into chart at this time.   The following record(s)  below were uploaded for Health Maintenance .         10/28/2022  HEMOGLOBIN A1c  LIPID PANEL  COMPLETE METABOLIC PANEL

## 2023-01-10 ENCOUNTER — PATIENT MESSAGE (OUTPATIENT)
Dept: ADMINISTRATIVE | Facility: HOSPITAL | Age: 68
End: 2023-01-10
Payer: COMMERCIAL

## 2023-01-17 ENCOUNTER — PATIENT MESSAGE (OUTPATIENT)
Dept: ADMINISTRATIVE | Facility: HOSPITAL | Age: 68
End: 2023-01-17
Payer: COMMERCIAL

## 2023-03-14 ENCOUNTER — PATIENT MESSAGE (OUTPATIENT)
Dept: ADMINISTRATIVE | Facility: HOSPITAL | Age: 68
End: 2023-03-14
Payer: COMMERCIAL

## 2023-03-14 ENCOUNTER — PATIENT OUTREACH (OUTPATIENT)
Dept: ADMINISTRATIVE | Facility: HOSPITAL | Age: 68
End: 2023-03-14
Payer: COMMERCIAL

## 2023-07-27 ENCOUNTER — PATIENT MESSAGE (OUTPATIENT)
Dept: FAMILY MEDICINE | Facility: CLINIC | Age: 68
End: 2023-07-27
Payer: MEDICARE

## 2023-07-28 RX ORDER — PROMETHAZINE HYDROCHLORIDE AND DEXTROMETHORPHAN HYDROBROMIDE 6.25; 15 MG/5ML; MG/5ML
SYRUP ORAL
Qty: 240 ML | Refills: 1 | Status: SHIPPED | OUTPATIENT
Start: 2023-07-28 | End: 2023-10-17 | Stop reason: SDUPTHER

## 2023-08-30 ENCOUNTER — PATIENT OUTREACH (OUTPATIENT)
Dept: ADMINISTRATIVE | Facility: HOSPITAL | Age: 68
End: 2023-08-30
Payer: MEDICARE

## 2023-08-30 ENCOUNTER — PATIENT MESSAGE (OUTPATIENT)
Dept: ADMINISTRATIVE | Facility: HOSPITAL | Age: 68
End: 2023-08-30
Payer: MEDICARE

## 2023-08-30 NOTE — PROGRESS NOTES
Non-compliant report chart audits for BREAST CANCER SCREENING. Chart review completed for HM test overdue (mammograms, Colonoscopies, pap smears, DM labs, and/or EYE EXAMs)      Care Everywhere and media, updates requested and reviewed.        Outreach to patient in reference to breast cancer screening.  RE:  Patient needs to schedule mammogram.    Attempted to call patient.  No answer could not leave voice mail.    Portal message sent to patient.

## 2023-10-03 ENCOUNTER — PATIENT MESSAGE (OUTPATIENT)
Dept: ADMINISTRATIVE | Facility: HOSPITAL | Age: 68
End: 2023-10-03
Payer: MEDICARE

## 2023-10-17 ENCOUNTER — HOSPITAL ENCOUNTER (OUTPATIENT)
Dept: RADIOLOGY | Facility: HOSPITAL | Age: 68
Discharge: HOME | End: 2023-10-17
Attending: STUDENT IN AN ORGANIZED HEALTH CARE EDUCATION/TRAINING PROGRAM
Payer: MEDICARE

## 2023-10-17 ENCOUNTER — OFFICE VISIT (OUTPATIENT)
Dept: FAMILY MEDICINE | Facility: CLINIC | Age: 68
End: 2023-10-17
Payer: MEDICARE

## 2023-10-17 VITALS
HEIGHT: 65 IN | DIASTOLIC BLOOD PRESSURE: 66 MMHG | BODY MASS INDEX: 28.66 KG/M2 | RESPIRATION RATE: 16 BRPM | WEIGHT: 172 LBS | HEART RATE: 82 BPM | OXYGEN SATURATION: 95 % | SYSTOLIC BLOOD PRESSURE: 134 MMHG

## 2023-10-17 DIAGNOSIS — E78.2 MIXED HYPERLIPIDEMIA: ICD-10-CM

## 2023-10-17 DIAGNOSIS — J45.40 MODERATE PERSISTENT ASTHMA WITHOUT COMPLICATION: ICD-10-CM

## 2023-10-17 DIAGNOSIS — Z78.0 POSTMENOPAUSAL: ICD-10-CM

## 2023-10-17 DIAGNOSIS — E11.9 TYPE 2 DIABETES MELLITUS WITHOUT COMPLICATION, WITHOUT LONG-TERM CURRENT USE OF INSULIN: ICD-10-CM

## 2023-10-17 DIAGNOSIS — M76.62 TENDONITIS, ACHILLES, LEFT: ICD-10-CM

## 2023-10-17 DIAGNOSIS — M54.9 BACK PAIN, UNSPECIFIED BACK LOCATION, UNSPECIFIED BACK PAIN LATERALITY, UNSPECIFIED CHRONICITY: Primary | ICD-10-CM

## 2023-10-17 DIAGNOSIS — M54.9 BACK PAIN, UNSPECIFIED BACK LOCATION, UNSPECIFIED BACK PAIN LATERALITY, UNSPECIFIED CHRONICITY: ICD-10-CM

## 2023-10-17 DIAGNOSIS — M15.9 PRIMARY OSTEOARTHRITIS INVOLVING MULTIPLE JOINTS: ICD-10-CM

## 2023-10-17 DIAGNOSIS — I10 PRIMARY HYPERTENSION: ICD-10-CM

## 2023-10-17 DIAGNOSIS — R05.3 CHRONIC COUGH: ICD-10-CM

## 2023-10-17 PROBLEM — R73.03 PREDIABETES: Status: ACTIVE | Noted: 2023-10-17

## 2023-10-17 PROBLEM — E78.5 HYPERLIPIDEMIA: Status: ACTIVE | Noted: 2023-10-17

## 2023-10-17 PROCEDURE — 99214 PR OFFICE/OUTPT VISIT, EST, LEVL IV, 30-39 MIN: ICD-10-PCS | Mod: ,,, | Performed by: STUDENT IN AN ORGANIZED HEALTH CARE EDUCATION/TRAINING PROGRAM

## 2023-10-17 PROCEDURE — 3078F DIAST BP <80 MM HG: CPT | Mod: CPTII,,, | Performed by: STUDENT IN AN ORGANIZED HEALTH CARE EDUCATION/TRAINING PROGRAM

## 2023-10-17 PROCEDURE — 1160F RVW MEDS BY RX/DR IN RCRD: CPT | Mod: CPTII,,, | Performed by: STUDENT IN AN ORGANIZED HEALTH CARE EDUCATION/TRAINING PROGRAM

## 2023-10-17 PROCEDURE — 1126F AMNT PAIN NOTED NONE PRSNT: CPT | Mod: CPTII,,, | Performed by: STUDENT IN AN ORGANIZED HEALTH CARE EDUCATION/TRAINING PROGRAM

## 2023-10-17 PROCEDURE — 1101F PT FALLS ASSESS-DOCD LE1/YR: CPT | Mod: CPTII,,, | Performed by: STUDENT IN AN ORGANIZED HEALTH CARE EDUCATION/TRAINING PROGRAM

## 2023-10-17 PROCEDURE — 1159F PR MEDICATION LIST DOCUMENTED IN MEDICAL RECORD: ICD-10-PCS | Mod: CPTII,,, | Performed by: STUDENT IN AN ORGANIZED HEALTH CARE EDUCATION/TRAINING PROGRAM

## 2023-10-17 PROCEDURE — 3078F PR MOST RECENT DIASTOLIC BLOOD PRESSURE < 80 MM HG: ICD-10-PCS | Mod: CPTII,,, | Performed by: STUDENT IN AN ORGANIZED HEALTH CARE EDUCATION/TRAINING PROGRAM

## 2023-10-17 PROCEDURE — 4010F PR ACE/ARB THEARPY RXD/TAKEN: ICD-10-PCS | Mod: CPTII,,, | Performed by: STUDENT IN AN ORGANIZED HEALTH CARE EDUCATION/TRAINING PROGRAM

## 2023-10-17 PROCEDURE — 1126F PR PAIN SEVERITY QUANTIFIED, NO PAIN PRESENT: ICD-10-PCS | Mod: CPTII,,, | Performed by: STUDENT IN AN ORGANIZED HEALTH CARE EDUCATION/TRAINING PROGRAM

## 2023-10-17 PROCEDURE — 3075F PR MOST RECENT SYSTOLIC BLOOD PRESS GE 130-139MM HG: ICD-10-PCS | Mod: CPTII,,, | Performed by: STUDENT IN AN ORGANIZED HEALTH CARE EDUCATION/TRAINING PROGRAM

## 2023-10-17 PROCEDURE — 99214 OFFICE O/P EST MOD 30 MIN: CPT | Mod: ,,, | Performed by: STUDENT IN AN ORGANIZED HEALTH CARE EDUCATION/TRAINING PROGRAM

## 2023-10-17 PROCEDURE — 3008F PR BODY MASS INDEX (BMI) DOCUMENTED: ICD-10-PCS | Mod: CPTII,,, | Performed by: STUDENT IN AN ORGANIZED HEALTH CARE EDUCATION/TRAINING PROGRAM

## 2023-10-17 PROCEDURE — 1159F MED LIST DOCD IN RCRD: CPT | Mod: CPTII,,, | Performed by: STUDENT IN AN ORGANIZED HEALTH CARE EDUCATION/TRAINING PROGRAM

## 2023-10-17 PROCEDURE — 72070 X-RAY EXAM THORAC SPINE 2VWS: CPT | Mod: 26,,, | Performed by: RADIOLOGY

## 2023-10-17 PROCEDURE — 72070 X-RAY EXAM THORAC SPINE 2VWS: CPT | Mod: TC,PN

## 2023-10-17 PROCEDURE — 4010F ACE/ARB THERAPY RXD/TAKEN: CPT | Mod: CPTII,,, | Performed by: STUDENT IN AN ORGANIZED HEALTH CARE EDUCATION/TRAINING PROGRAM

## 2023-10-17 PROCEDURE — 1160F PR REVIEW ALL MEDS BY PRESCRIBER/CLIN PHARMACIST DOCUMENTED: ICD-10-PCS | Mod: CPTII,,, | Performed by: STUDENT IN AN ORGANIZED HEALTH CARE EDUCATION/TRAINING PROGRAM

## 2023-10-17 PROCEDURE — 3288F PR FALLS RISK ASSESSMENT DOCUMENTED: ICD-10-PCS | Mod: CPTII,,, | Performed by: STUDENT IN AN ORGANIZED HEALTH CARE EDUCATION/TRAINING PROGRAM

## 2023-10-17 PROCEDURE — 3288F FALL RISK ASSESSMENT DOCD: CPT | Mod: CPTII,,, | Performed by: STUDENT IN AN ORGANIZED HEALTH CARE EDUCATION/TRAINING PROGRAM

## 2023-10-17 PROCEDURE — 99999 PR PBB SHADOW E&M-EST. PATIENT-LVL IV: ICD-10-PCS | Mod: PBBFAC,,, | Performed by: STUDENT IN AN ORGANIZED HEALTH CARE EDUCATION/TRAINING PROGRAM

## 2023-10-17 PROCEDURE — 3008F BODY MASS INDEX DOCD: CPT | Mod: CPTII,,, | Performed by: STUDENT IN AN ORGANIZED HEALTH CARE EDUCATION/TRAINING PROGRAM

## 2023-10-17 PROCEDURE — 1101F PR PT FALLS ASSESS DOC 0-1 FALLS W/OUT INJ PAST YR: ICD-10-PCS | Mod: CPTII,,, | Performed by: STUDENT IN AN ORGANIZED HEALTH CARE EDUCATION/TRAINING PROGRAM

## 2023-10-17 PROCEDURE — 72070 XR THORACIC SPINE AP LATERAL: ICD-10-PCS | Mod: 26,,, | Performed by: RADIOLOGY

## 2023-10-17 PROCEDURE — 99999 PR PBB SHADOW E&M-EST. PATIENT-LVL IV: CPT | Mod: PBBFAC,,, | Performed by: STUDENT IN AN ORGANIZED HEALTH CARE EDUCATION/TRAINING PROGRAM

## 2023-10-17 PROCEDURE — 3075F SYST BP GE 130 - 139MM HG: CPT | Mod: CPTII,,, | Performed by: STUDENT IN AN ORGANIZED HEALTH CARE EDUCATION/TRAINING PROGRAM

## 2023-10-17 RX ORDER — ALBUTEROL SULFATE 90 UG/1
2 AEROSOL, METERED RESPIRATORY (INHALATION) EVERY 6 HOURS PRN
Qty: 18 G | Refills: 5 | Status: SHIPPED | OUTPATIENT
Start: 2023-10-17 | End: 2023-11-21

## 2023-10-17 RX ORDER — ATORVASTATIN CALCIUM 40 MG/1
40 TABLET, FILM COATED ORAL DAILY
Qty: 90 TABLET | Refills: 3 | Status: SHIPPED | OUTPATIENT
Start: 2023-10-17

## 2023-10-17 RX ORDER — LOSARTAN POTASSIUM 50 MG/1
50 TABLET ORAL DAILY
Qty: 90 TABLET | Refills: 3 | Status: SHIPPED | OUTPATIENT
Start: 2023-10-17

## 2023-10-17 RX ORDER — PROMETHAZINE HYDROCHLORIDE AND DEXTROMETHORPHAN HYDROBROMIDE 6.25; 15 MG/5ML; MG/5ML
SYRUP ORAL
Qty: 240 ML | Refills: 1 | Status: SHIPPED | OUTPATIENT
Start: 2023-10-17 | End: 2023-11-20 | Stop reason: SDUPTHER

## 2023-10-17 RX ORDER — AMLODIPINE BESYLATE 5 MG/1
5 TABLET ORAL DAILY
Qty: 90 TABLET | Refills: 3 | Status: SHIPPED | OUTPATIENT
Start: 2023-10-17

## 2023-10-17 RX ORDER — GLIMEPIRIDE 2 MG/1
TABLET ORAL
Qty: 90 TABLET | Refills: 3 | Status: SHIPPED | OUTPATIENT
Start: 2023-10-17

## 2023-10-17 NOTE — ASSESSMENT & PLAN NOTE
She has seen pulmonary   [place]  She gets yearly xrays.   Has not had a chest ct   6

## 2023-10-17 NOTE — PROGRESS NOTES
Subjective:       Patient ID: Keesha Quezada is a 68 y.o. female.    Chief Complaint: Establish Care    Patient Active Problem List:     Medial collateral ligament sprain of knee     Pes anserinus bursitis of both knees     Patellofemoral pain syndrome of both knees     Acute medial meniscus tear, left, initial encounter     Baker cyst, right     Tendonitis, Achilles, left     Acute medial meniscus tear, left, subsequent encounter     Chronic cough     Moderate persistent asthma without complication     Osteoarthritis of multiple joints     Hypertension     Hyperlipidemia     Type 2 diabetes mellitus without complication, without long-term current use of insulin    Current Outpatient Medications:  albuterol (VENTOLIN HFA) 90 mcg/actuation inhaler, Inhale 2 puffs into the lungs every 6 (six) hours as needed for Wheezing. Rescue  amLODIPine (NORVASC) 5 MG tablet, TAKE 1 TABLET EVERY DAY  atorvastatin (LIPITOR) 40 MG tablet, TAKE 1 TABLET EVERY DAY  glimepiride (AMARYL) 2 MG tablet, TAKE 1 TABLET EVERY MORNING  BEFORE  BREKAFAST  losartan (COZAAR) 50 MG tablet, TAKE 1 TABLET EVERY DAY  promethazine-dextromethorphan (PROMETHAZINE-DM) 6.25-15 mg/5 mL Syrp, TAKE 5 MLS BY MOUTH EVERY 6 HOURS AS NEEDED FOR COUGH. Strength: 6.25-15 mg/5 mL  diclofenac sodium (VOLTAREN) 1 % Gel, APPLY TO AFFECTED AREA TWICE A DAY (MASSAGE INTO AREA FOR 2 MINUTES)    No current facility-administered medications for this visit.          Review of Systems   Constitutional:  Negative for activity change and appetite change.   Respiratory:  Positive for cough. Negative for shortness of breath.    Cardiovascular:  Negative for chest pain.   Gastrointestinal:  Negative for anal bleeding.   Genitourinary:  Negative for dysuria.   Integumentary:  Negative for rash.   Psychiatric/Behavioral:  Negative for dysphoric mood and sleep disturbance. The patient is not nervous/anxious.          Objective:      Physical Exam  Constitutional:       General: She is  not in acute distress.     Appearance: Normal appearance. She is not ill-appearing.   Eyes:      Conjunctiva/sclera: Conjunctivae normal.   Cardiovascular:      Rate and Rhythm: Normal rate and regular rhythm.      Heart sounds: Normal heart sounds. No murmur heard.  Pulmonary:      Effort: Pulmonary effort is normal. No respiratory distress.      Breath sounds: Normal breath sounds.   Musculoskeletal:      Right lower leg: No edema.      Left lower leg: No edema.   Skin:     General: Skin is warm and dry.   Neurological:      Mental Status: She is alert. Mental status is at baseline.      Gait: Gait normal.   Psychiatric:         Mood and Affect: Mood normal.         Behavior: Behavior normal.         Thought Content: Thought content normal.         Judgment: Judgment normal.         Assessment:       1. Back pain, unspecified back location, unspecified back pain laterality, unspecified chronicity    2. Chronic cough    3. Moderate persistent asthma without complication    4. Primary osteoarthritis involving multiple joints    5. Type 2 diabetes mellitus without complication, without long-term current use of insulin    6. Mixed hyperlipidemia    7. Primary hypertension    8. Tendonitis, Achilles, left    9. Postmenopausal        Plan:       Problem List Items Addressed This Visit          Pulmonary    Chronic cough     She has seen pulmonary   [place] a [place]  She gets yearly xrays.   Has not had a chest ct         Relevant Medications    albuterol (VENTOLIN HFA) 90 mcg/actuation inhaler    promethazine-dextromethorphan (PROMETHAZINE-DM) 6.25-15 mg/5 mL Syrp    Other Relevant Orders    X-Ray Thoracic Spine AP Lateral    CT Chest Without Contrast    Moderate persistent asthma without complication     She uses some occasional promethazine cough for this.  She uses albuterol.  Has seen pulmonary          Relevant Medications    albuterol (VENTOLIN HFA) 90 mcg/actuation inhaler       Cardiac/Vascular     Hypertension     Stable. Continue current medications and regular followup.  Hypertension Medications               amLODIPine (NORVASC) 5 MG tablet TAKE 1 TABLET EVERY DAY    losartan (COZAAR) 50 MG tablet TAKE 1 TABLET EVERY DAY                   Relevant Medications    amLODIPine (NORVASC) 5 MG tablet    losartan (COZAAR) 50 MG tablet    Other Relevant Orders    CBC Auto Differential    Comprehensive Metabolic Panel    Hyperlipidemia     Stable. Continue current medications and regular followup.  Hyperlipidemia Medications               atorvastatin (LIPITOR) 40 MG tablet TAKE 1 TABLET EVERY DAY                   Relevant Medications    atorvastatin (LIPITOR) 40 MG tablet    Other Relevant Orders    Lipid Panel       Endocrine    Type 2 diabetes mellitus without complication, without long-term current use of insulin     Lab Results   Component Value Date    HGBA1C 6.2 (A) 10/28/2022            Relevant Medications    glimepiride (AMARYL) 2 MG tablet    Other Relevant Orders    Microalbumin/Creatinine Ratio, Urine    Hemoglobin A1C    TSH       Orthopedic    Tendonitis, Achilles, left    Relevant Orders    Ambulatory referral/consult to Podiatry    Osteoarthritis of multiple joints     Seeing orthopedics          Other Visit Diagnoses       Back pain, unspecified back location, unspecified back pain laterality, unspecified chronicity    -  Primary    Relevant Orders    X-Ray Thoracic Spine AP Lateral    Postmenopausal        Relevant Orders    DXA Bone Density Axial Skeleton 1 or more sites              Declines mammogram

## 2023-10-17 NOTE — ASSESSMENT & PLAN NOTE
Stable. Continue current medications and regular followup.  Hyperlipidemia Medications             atorvastatin (LIPITOR) 40 MG tablet TAKE 1 TABLET EVERY DAY

## 2023-10-17 NOTE — ASSESSMENT & PLAN NOTE
Stable. Continue current medications and regular followup.  Hypertension Medications             amLODIPine (NORVASC) 5 MG tablet TAKE 1 TABLET EVERY DAY    losartan (COZAAR) 50 MG tablet TAKE 1 TABLET EVERY DAY

## 2023-10-19 ENCOUNTER — LAB VISIT (OUTPATIENT)
Dept: LAB | Facility: HOSPITAL | Age: 68
End: 2023-10-19
Attending: STUDENT IN AN ORGANIZED HEALTH CARE EDUCATION/TRAINING PROGRAM
Payer: MEDICARE

## 2023-10-19 DIAGNOSIS — E11.9 TYPE 2 DIABETES MELLITUS WITHOUT COMPLICATION, WITHOUT LONG-TERM CURRENT USE OF INSULIN: ICD-10-CM

## 2023-10-19 DIAGNOSIS — I10 PRIMARY HYPERTENSION: ICD-10-CM

## 2023-10-19 DIAGNOSIS — E78.2 MIXED HYPERLIPIDEMIA: ICD-10-CM

## 2023-10-19 LAB
ALBUMIN SERPL BCP-MCNC: 3.8 G/DL (ref 3.5–5.2)
ALBUMIN/CREAT UR: 8.6 UG/MG (ref 0–30)
ALP SERPL-CCNC: 113 U/L (ref 55–135)
ALT SERPL W/O P-5'-P-CCNC: 24 U/L (ref 10–44)
ANION GAP SERPL CALC-SCNC: 9 MMOL/L (ref 8–16)
AST SERPL-CCNC: 25 U/L (ref 10–40)
BASOPHILS # BLD AUTO: 0.03 K/UL (ref 0–0.2)
BASOPHILS NFR BLD: 0.4 % (ref 0–1.9)
BILIRUB SERPL-MCNC: 0.5 MG/DL (ref 0.1–1)
BUN SERPL-MCNC: 8 MG/DL (ref 8–23)
CALCIUM SERPL-MCNC: 9.4 MG/DL (ref 8.7–10.5)
CHLORIDE SERPL-SCNC: 108 MMOL/L (ref 95–110)
CHOLEST SERPL-MCNC: 173 MG/DL (ref 120–199)
CHOLEST/HDLC SERPL: 4.1 {RATIO} (ref 2–5)
CO2 SERPL-SCNC: 25 MMOL/L (ref 23–29)
CREAT SERPL-MCNC: 0.8 MG/DL (ref 0.5–1.4)
CREAT UR-MCNC: 139 MG/DL (ref 15–325)
DIFFERENTIAL METHOD: ABNORMAL
EOSINOPHIL # BLD AUTO: 0 K/UL (ref 0–0.5)
EOSINOPHIL NFR BLD: 0.4 % (ref 0–8)
ERYTHROCYTE [DISTWIDTH] IN BLOOD BY AUTOMATED COUNT: 13.2 % (ref 11.5–14.5)
EST. GFR  (NO RACE VARIABLE): >60 ML/MIN/1.73 M^2
ESTIMATED AVG GLUCOSE: 131 MG/DL (ref 68–131)
GLUCOSE SERPL-MCNC: 122 MG/DL (ref 70–110)
HBA1C MFR BLD: 6.2 % (ref 4–5.6)
HCT VFR BLD AUTO: 40.2 % (ref 37–48.5)
HDLC SERPL-MCNC: 42 MG/DL (ref 40–75)
HDLC SERPL: 24.3 % (ref 20–50)
HGB BLD-MCNC: 12.9 G/DL (ref 12–16)
IMM GRANULOCYTES # BLD AUTO: 0.01 K/UL (ref 0–0.04)
IMM GRANULOCYTES NFR BLD AUTO: 0.1 % (ref 0–0.5)
LDLC SERPL CALC-MCNC: 107.6 MG/DL (ref 63–159)
LYMPHOCYTES # BLD AUTO: 1.8 K/UL (ref 1–4.8)
LYMPHOCYTES NFR BLD: 26.5 % (ref 18–48)
MCH RBC QN AUTO: 27.9 PG (ref 27–31)
MCHC RBC AUTO-ENTMCNC: 32.1 G/DL (ref 32–36)
MCV RBC AUTO: 87 FL (ref 82–98)
MICROALBUMIN UR DL<=1MG/L-MCNC: 12 UG/ML
MONOCYTES # BLD AUTO: 0.4 K/UL (ref 0.3–1)
MONOCYTES NFR BLD: 5.5 % (ref 4–15)
NEUTROPHILS # BLD AUTO: 4.6 K/UL (ref 1.8–7.7)
NEUTROPHILS NFR BLD: 67.1 % (ref 38–73)
NONHDLC SERPL-MCNC: 131 MG/DL
NRBC BLD-RTO: 0 /100 WBC
PLATELET # BLD AUTO: 261 K/UL (ref 150–450)
PMV BLD AUTO: 9.1 FL (ref 9.2–12.9)
POTASSIUM SERPL-SCNC: 3.9 MMOL/L (ref 3.5–5.1)
PROT SERPL-MCNC: 7.2 G/DL (ref 6–8.4)
RBC # BLD AUTO: 4.63 M/UL (ref 4–5.4)
SODIUM SERPL-SCNC: 142 MMOL/L (ref 136–145)
TRIGL SERPL-MCNC: 117 MG/DL (ref 30–150)
TSH SERPL DL<=0.005 MIU/L-ACNC: 1.01 UIU/ML (ref 0.4–4)
WBC # BLD AUTO: 6.91 K/UL (ref 3.9–12.7)

## 2023-10-19 PROCEDURE — 80061 LIPID PANEL: CPT | Performed by: STUDENT IN AN ORGANIZED HEALTH CARE EDUCATION/TRAINING PROGRAM

## 2023-10-19 PROCEDURE — 80053 COMPREHEN METABOLIC PANEL: CPT | Performed by: STUDENT IN AN ORGANIZED HEALTH CARE EDUCATION/TRAINING PROGRAM

## 2023-10-19 PROCEDURE — 36415 COLL VENOUS BLD VENIPUNCTURE: CPT | Performed by: STUDENT IN AN ORGANIZED HEALTH CARE EDUCATION/TRAINING PROGRAM

## 2023-10-19 PROCEDURE — 82043 UR ALBUMIN QUANTITATIVE: CPT | Performed by: STUDENT IN AN ORGANIZED HEALTH CARE EDUCATION/TRAINING PROGRAM

## 2023-10-19 PROCEDURE — 84443 ASSAY THYROID STIM HORMONE: CPT | Performed by: STUDENT IN AN ORGANIZED HEALTH CARE EDUCATION/TRAINING PROGRAM

## 2023-10-19 PROCEDURE — 85025 COMPLETE CBC W/AUTO DIFF WBC: CPT | Performed by: STUDENT IN AN ORGANIZED HEALTH CARE EDUCATION/TRAINING PROGRAM

## 2023-10-19 PROCEDURE — 83036 HEMOGLOBIN GLYCOSYLATED A1C: CPT | Performed by: STUDENT IN AN ORGANIZED HEALTH CARE EDUCATION/TRAINING PROGRAM

## 2023-10-20 ENCOUNTER — HOSPITAL ENCOUNTER (OUTPATIENT)
Dept: RADIOLOGY | Facility: HOSPITAL | Age: 68
Discharge: HOME | End: 2023-10-20
Attending: STUDENT IN AN ORGANIZED HEALTH CARE EDUCATION/TRAINING PROGRAM
Payer: MEDICARE

## 2023-10-20 DIAGNOSIS — R05.3 CHRONIC COUGH: ICD-10-CM

## 2023-10-20 DIAGNOSIS — R05.3 CHRONIC COUGH: Primary | ICD-10-CM

## 2023-10-20 DIAGNOSIS — R93.89 ABNORMAL CT OF THE CHEST: ICD-10-CM

## 2023-10-20 DIAGNOSIS — Z78.0 POSTMENOPAUSAL: ICD-10-CM

## 2023-10-20 PROBLEM — M85.89 OSTEOPENIA OF MULTIPLE SITES: Status: ACTIVE | Noted: 2023-10-20

## 2023-10-20 PROCEDURE — 71250 CT CHEST WITHOUT CONTRAST: ICD-10-PCS | Mod: 26,,, | Performed by: RADIOLOGY

## 2023-10-20 PROCEDURE — 77080 DXA BONE DENSITY AXIAL: CPT | Mod: 26,,, | Performed by: RADIOLOGY

## 2023-10-20 PROCEDURE — 71250 CT THORAX DX C-: CPT | Mod: 26,,, | Performed by: RADIOLOGY

## 2023-10-20 PROCEDURE — 71250 CT THORAX DX C-: CPT | Mod: TC

## 2023-10-20 PROCEDURE — 77080 DXA BONE DENSITY AXIAL: CPT | Mod: TC

## 2023-10-20 PROCEDURE — 77080 DXA BONE DENSITY AXIAL SKELETON 1 OR MORE SITES: ICD-10-PCS | Mod: 26,,, | Performed by: RADIOLOGY

## 2023-10-23 ENCOUNTER — TELEPHONE (OUTPATIENT)
Dept: FAMILY MEDICINE | Facility: CLINIC | Age: 68
End: 2023-10-23
Payer: MEDICARE

## 2023-10-23 NOTE — TELEPHONE ENCOUNTER
----- Message from Radha Marquez MD sent at 10/20/2023 10:35 PM CDT -----  Please set up her pulm appointment as soon as possible.

## 2023-10-23 NOTE — TELEPHONE ENCOUNTER
----- Message from Radha Marquze MD sent at 10/20/2023 10:35 PM CDT -----  Please set up her pulm appointment as soon as possible.

## 2023-10-27 ENCOUNTER — PATIENT MESSAGE (OUTPATIENT)
Dept: FAMILY MEDICINE | Facility: CLINIC | Age: 68
End: 2023-10-27
Payer: MEDICARE

## 2023-11-01 RX ORDER — AZITHROMYCIN 250 MG/1
TABLET, FILM COATED ORAL
Qty: 6 TABLET | Refills: 0 | Status: SHIPPED | OUTPATIENT
Start: 2023-11-01 | End: 2023-11-07

## 2023-11-07 ENCOUNTER — OFFICE VISIT (OUTPATIENT)
Dept: PODIATRY | Facility: CLINIC | Age: 68
End: 2023-11-07
Payer: MEDICARE

## 2023-11-07 ENCOUNTER — HOSPITAL ENCOUNTER (OUTPATIENT)
Dept: RADIOLOGY | Facility: HOSPITAL | Age: 68
Discharge: HOME | End: 2023-11-07
Attending: PODIATRIST
Payer: MEDICARE

## 2023-11-07 ENCOUNTER — TELEPHONE (OUTPATIENT)
Dept: PODIATRY | Facility: CLINIC | Age: 68
End: 2023-11-07
Payer: MEDICARE

## 2023-11-07 VITALS
DIASTOLIC BLOOD PRESSURE: 79 MMHG | HEART RATE: 89 BPM | SYSTOLIC BLOOD PRESSURE: 130 MMHG | WEIGHT: 176 LBS | BODY MASS INDEX: 29.32 KG/M2 | HEIGHT: 65 IN

## 2023-11-07 DIAGNOSIS — M77.31 CALCANEAL SPUR OF RIGHT FOOT: ICD-10-CM

## 2023-11-07 DIAGNOSIS — M77.31 CALCANEAL SPUR OF RIGHT FOOT: Primary | ICD-10-CM

## 2023-11-07 DIAGNOSIS — M76.61 ACHILLES BURSITIS OF RIGHT LOWER EXTREMITY: ICD-10-CM

## 2023-11-07 PROCEDURE — 1125F PR PAIN SEVERITY QUANTIFIED, PAIN PRESENT: ICD-10-PCS | Mod: CPTII,S$GLB,, | Performed by: PODIATRIST

## 2023-11-07 PROCEDURE — 1159F MED LIST DOCD IN RCRD: CPT | Mod: CPTII,S$GLB,, | Performed by: PODIATRIST

## 2023-11-07 PROCEDURE — 1101F PR PT FALLS ASSESS DOC 0-1 FALLS W/OUT INJ PAST YR: ICD-10-PCS | Mod: CPTII,S$GLB,, | Performed by: PODIATRIST

## 2023-11-07 PROCEDURE — 73630 X-RAY EXAM OF FOOT: CPT | Mod: TC,PN,RT

## 2023-11-07 PROCEDURE — 1160F PR REVIEW ALL MEDS BY PRESCRIBER/CLIN PHARMACIST DOCUMENTED: ICD-10-PCS | Mod: CPTII,S$GLB,, | Performed by: PODIATRIST

## 2023-11-07 PROCEDURE — 3078F PR MOST RECENT DIASTOLIC BLOOD PRESSURE < 80 MM HG: ICD-10-PCS | Mod: CPTII,S$GLB,, | Performed by: PODIATRIST

## 2023-11-07 PROCEDURE — 96372 PR INJECTION,THERAP/PROPH/DIAG2ST, IM OR SUBCUT: ICD-10-PCS | Mod: S$GLB,,, | Performed by: PODIATRIST

## 2023-11-07 PROCEDURE — 3066F NEPHROPATHY DOC TX: CPT | Mod: CPTII,S$GLB,, | Performed by: PODIATRIST

## 2023-11-07 PROCEDURE — 3008F BODY MASS INDEX DOCD: CPT | Mod: CPTII,S$GLB,, | Performed by: PODIATRIST

## 2023-11-07 PROCEDURE — 3044F PR MOST RECENT HEMOGLOBIN A1C LEVEL <7.0%: ICD-10-PCS | Mod: CPTII,S$GLB,, | Performed by: PODIATRIST

## 2023-11-07 PROCEDURE — 3061F PR NEG MICROALBUMINURIA RESULT DOCUMENTED/REVIEW: ICD-10-PCS | Mod: CPTII,S$GLB,, | Performed by: PODIATRIST

## 2023-11-07 PROCEDURE — 99999 PR PBB SHADOW E&M-EST. PATIENT-LVL IV: CPT | Mod: PBBFAC,,, | Performed by: PODIATRIST

## 2023-11-07 PROCEDURE — 3075F PR MOST RECENT SYSTOLIC BLOOD PRESS GE 130-139MM HG: ICD-10-PCS | Mod: CPTII,S$GLB,, | Performed by: PODIATRIST

## 2023-11-07 PROCEDURE — 3061F NEG MICROALBUMINURIA REV: CPT | Mod: CPTII,S$GLB,, | Performed by: PODIATRIST

## 2023-11-07 PROCEDURE — 1125F AMNT PAIN NOTED PAIN PRSNT: CPT | Mod: CPTII,S$GLB,, | Performed by: PODIATRIST

## 2023-11-07 PROCEDURE — 4010F ACE/ARB THERAPY RXD/TAKEN: CPT | Mod: CPTII,S$GLB,, | Performed by: PODIATRIST

## 2023-11-07 PROCEDURE — 3066F PR DOCUMENTATION OF TREATMENT FOR NEPHROPATHY: ICD-10-PCS | Mod: CPTII,S$GLB,, | Performed by: PODIATRIST

## 2023-11-07 PROCEDURE — 4010F PR ACE/ARB THEARPY RXD/TAKEN: ICD-10-PCS | Mod: CPTII,S$GLB,, | Performed by: PODIATRIST

## 2023-11-07 PROCEDURE — 96372 THER/PROPH/DIAG INJ SC/IM: CPT | Mod: S$GLB,,, | Performed by: PODIATRIST

## 2023-11-07 PROCEDURE — 1159F PR MEDICATION LIST DOCUMENTED IN MEDICAL RECORD: ICD-10-PCS | Mod: CPTII,S$GLB,, | Performed by: PODIATRIST

## 2023-11-07 PROCEDURE — 3078F DIAST BP <80 MM HG: CPT | Mod: CPTII,S$GLB,, | Performed by: PODIATRIST

## 2023-11-07 PROCEDURE — 3044F HG A1C LEVEL LT 7.0%: CPT | Mod: CPTII,S$GLB,, | Performed by: PODIATRIST

## 2023-11-07 PROCEDURE — 1160F RVW MEDS BY RX/DR IN RCRD: CPT | Mod: CPTII,S$GLB,, | Performed by: PODIATRIST

## 2023-11-07 PROCEDURE — 73630 X-RAY EXAM OF FOOT: CPT | Mod: 26,RT,, | Performed by: RADIOLOGY

## 2023-11-07 PROCEDURE — 1101F PT FALLS ASSESS-DOCD LE1/YR: CPT | Mod: CPTII,S$GLB,, | Performed by: PODIATRIST

## 2023-11-07 PROCEDURE — 3288F PR FALLS RISK ASSESSMENT DOCUMENTED: ICD-10-PCS | Mod: CPTII,S$GLB,, | Performed by: PODIATRIST

## 2023-11-07 PROCEDURE — 3288F FALL RISK ASSESSMENT DOCD: CPT | Mod: CPTII,S$GLB,, | Performed by: PODIATRIST

## 2023-11-07 PROCEDURE — 3075F SYST BP GE 130 - 139MM HG: CPT | Mod: CPTII,S$GLB,, | Performed by: PODIATRIST

## 2023-11-07 PROCEDURE — 3008F PR BODY MASS INDEX (BMI) DOCUMENTED: ICD-10-PCS | Mod: CPTII,S$GLB,, | Performed by: PODIATRIST

## 2023-11-07 PROCEDURE — 99203 PR OFFICE/OUTPT VISIT, NEW, LEVL III, 30-44 MIN: ICD-10-PCS | Mod: 25,S$GLB,, | Performed by: PODIATRIST

## 2023-11-07 PROCEDURE — 99203 OFFICE O/P NEW LOW 30 MIN: CPT | Mod: 25,S$GLB,, | Performed by: PODIATRIST

## 2023-11-07 PROCEDURE — 73630 XR FOOT COMPLETE 3 VIEW RIGHT: ICD-10-PCS | Mod: 26,RT,, | Performed by: RADIOLOGY

## 2023-11-07 PROCEDURE — 99999 PR PBB SHADOW E&M-EST. PATIENT-LVL IV: ICD-10-PCS | Mod: PBBFAC,,, | Performed by: PODIATRIST

## 2023-11-07 RX ORDER — KETOROLAC TROMETHAMINE 30 MG/ML
30 INJECTION, SOLUTION INTRAMUSCULAR; INTRAVENOUS
Status: COMPLETED | OUTPATIENT
Start: 2023-11-07 | End: 2023-11-07

## 2023-11-07 RX ADMIN — KETOROLAC TROMETHAMINE 30 MG: 30 INJECTION, SOLUTION INTRAMUSCULAR; INTRAVENOUS at 03:11

## 2023-11-07 NOTE — TELEPHONE ENCOUNTER
LVM for patient that Dr. Lubin will go over xray more thoroughly at follow up appointment. If she has any questions before then to call the office back.

## 2023-11-11 NOTE — PROGRESS NOTES
Subjective:       Patient ID: Keesha Quezada is a 68 y.o. female.    Chief Complaint: Tendonitis (Both//) and Swelling  Patient presents today with complaint of Achilles tendinitis right.  Patient states that she wears tennis shoes 8-9 hours a day whenever she wears closed in shoes are right heel causes her severe discomfort.  Patient states that this has been going on for about a year but has gotten significantly worse she believes some boots that she wore that had a 3-1/2 in she will for Halloween really got the back of her right heel inflamed.  Patient states when she walks a lot in the yd she has discomfort she does take Tylenol on occasion.    Past Medical History:   Diagnosis Date    Diabetes mellitus     Hyperlipidemia     Hypertension     Unspecified macular degeneration     Left eye     Past Surgical History:   Procedure Laterality Date    ARTHROSCOPIC CHONDROPLASTY OF KNEE JOINT Left 12/3/2019    Procedure: ARTHROSCOPY, KNEE, WITH CHONDROPLASTY;  Surgeon: Jayme Lucas DO;  Location: [facility] OR;  Service: Orthopedics;  Laterality: Left;     SECTION      KNEE ARTHROSCOPY W/ MENISCECTOMY Left 12/3/2019    Procedure: ARTHROSCOPY, KNEE, WITH MENISCECTOMY;  Surgeon: Jayme Lucas DO;  Location: [facility] OR;  Service: Orthopedics;  Laterality: Left;    KNEE ARTHROSCOPY W/ PLICA EXCISION Left 12/3/2019    Procedure: EXCISION, PLICA, KNEE, ARTHROSCOPIC;  Surgeon: Jayme Lucas DO;  Location: [facility] OR;  Service: Orthopedics;  Laterality: Left;         Social History     Socioeconomic History    Marital status:    Tobacco Use    Smoking status: Never    Smokeless tobacco: Never   Substance and Sexual Activity    Alcohol use: Yes     Comment: occ    Drug use: No    Sexual activity: Yes     Partners: Male       Current Outpatient Medications   Medication Sig Dispense Refill    albuterol (VENTOLIN HFA) 90 mcg/actuation inhaler Inhale 2 puffs into the lungs every 6 (six) hours as needed for Wheezing.  "Rescue 18 g 5    amLODIPine (NORVASC) 5 MG tablet Take 1 tablet (5 mg total) by mouth once daily. 90 tablet 3    atorvastatin (LIPITOR) 40 MG tablet Take 1 tablet (40 mg total) by mouth once daily. 90 tablet 3    diclofenac sodium (VOLTAREN) 1 % Gel APPLY TO AFFECTED AREA TWICE A DAY (MASSAGE INTO AREA FOR 2 MINUTES)  5    glimepiride (AMARYL) 2 MG tablet TAKE 1 TABLET EVERY MORNING  BEFORE  BREKAFAST 90 tablet 3    losartan (COZAAR) 50 MG tablet Take 1 tablet (50 mg total) by mouth once daily. 90 tablet 3    promethazine-dextromethorphan (PROMETHAZINE-DM) 6.25-15 mg/5 mL Syrp TAKE 5 MLS BY MOUTH EVERY 6 HOURS AS NEEDED FOR COUGH. Strength: 6.25-15 mg/5 mL 240 mL 1     No current facility-administered medications for this visit.     Review of patient's allergies indicates:   Allergen Reactions    Cortisone      Hearing Loss       Review of Systems   Musculoskeletal:  Positive for arthralgias, gait problem and joint swelling.   All other systems reviewed and are negative.      Objective:      Vitals:    11/07/23 1438   BP: 130/79   BP Location: Right arm   Patient Position: Sitting   Pulse: 89   Weight: 79.8 kg (176 lb)   Height: 5' 5" (1.651 m)     Physical Exam  Vitals and nursing note reviewed.   Constitutional:       Appearance: Normal appearance.   Cardiovascular:      Pulses:           Dorsalis pedis pulses are 2+ on the right side and 2+ on the left side.        Posterior tibial pulses are 1+ on the right side and 1+ on the left side.   Pulmonary:      Effort: Pulmonary effort is normal.   Musculoskeletal:         General: Swelling, tenderness and deformity present.      Right foot: Deformity present.        Feet:    Feet:      Right foot:      Protective Sensation: 2 sites tested.  2 sites sensed.      Skin integrity: Erythema and warmth present.      Left foot:      Protective Sensation: 2 sites tested.  2 sites sensed.   Skin:     Capillary Refill: Capillary refill takes less than 2 seconds.      Findings: " Erythema present.   Neurological:      General: No focal deficit present.      Mental Status: She is alert.   Psychiatric:         Mood and Affect: Mood normal.         Behavior: Behavior normal.                        Assessment:       1. Calcaneal spur of right foot    2. Tendonitis, Achilles, left        Plan:       Patient presents today with complaint of Achilles tendinitis right.  Patient states that she wears tennis shoes 8-9 hours a day whenever she wears closed in shoes are right heel causes her severe discomfort.  Patient states that this has been going on for about a year but has gotten significantly worse she believes some boots that she wore that had a 3-1/2 in she will for Halloween really got the back of her right heel inflamed.  Patient states when she walks a lot in the yd she has discomfort she does take Tylenol on occasion.  A comprehensive new patient evaluation was performed today patient does have significant inflammation with associated erythema edema at the posterior aspect of the patient's right heel and at the region of the Achilles tendon insertion right.  In comparison the Achilles tendon bilateral patient does have significant inflammation it is difficult to determine whether not there is thickening noted upon palpation right 1st is the left.  Patient has discomfort upon direct palpation at the Achilles tendon insertion.  I did advised the patient obviously she has significant Achilles tendinitis there is likely underlying spurring in this area I did advised the patient it is not uncommon for people to have spurs at the back of the heel most of the time they do not cause any discomfort or even inflammation however the fact that this has been going on a year is concerning because it does put her at increased risk for an Achilles tendon rupture as over time the Achilles tendon can we can with chronic inflammation.  I did order x-rays to be taken of the patient's right foot the patient does  have significant posterior calcaneal spurring however the spur does not appear to be fracturing there was extensive soft tissue swelling patient was contacted advised as to these findings and these will be reviewed with the patient at her follow-up.  I do plan to follow up with the patient over the next several weeks I did dispense her heel lifts that I want her to start using it all times she is not to go barefoot I explained to the patient a slight heel lift can take some of the tension off of the Achilles allowing the inflammation to settle down.  Patient will start to apply Voltaren gel 3 times a day she already has Voltaren gel at home and I have advised her this has been shown to be very helpful.  Patient was administered a Toradol injection IM right side to help with the inflammation she has an allergy to steroids so we only provided the Toradol injection today 2-3 week follow-up unless the patient has any problems questions or concerns sooner.  Patient needs to avoid any bending squatting stairs ladders anything that is going to put excessive stress on the Achilles while were trying to get the inflammation settle down.This note was created using [employer] voice recognition software that occasionally misinterpreted phrases or words.

## 2023-11-20 ENCOUNTER — LAB VISIT (OUTPATIENT)
Dept: LAB | Facility: HOSPITAL | Age: 68
End: 2023-11-20
Attending: INTERNAL MEDICINE
Payer: MEDICARE

## 2023-11-20 ENCOUNTER — OFFICE VISIT (OUTPATIENT)
Dept: PULMONOLOGY | Facility: CLINIC | Age: 68
End: 2023-11-20
Payer: MEDICARE

## 2023-11-20 VITALS
OXYGEN SATURATION: 98 % | DIASTOLIC BLOOD PRESSURE: 80 MMHG | HEART RATE: 113 BPM | SYSTOLIC BLOOD PRESSURE: 138 MMHG | WEIGHT: 173.81 LBS | HEIGHT: 65 IN | BODY MASS INDEX: 28.96 KG/M2

## 2023-11-20 DIAGNOSIS — R93.89 ABNORMAL CT OF THE CHEST: ICD-10-CM

## 2023-11-20 DIAGNOSIS — R05.3 CHRONIC COUGH: ICD-10-CM

## 2023-11-20 DIAGNOSIS — M94.0 COSTOCHONDRITIS: ICD-10-CM

## 2023-11-20 DIAGNOSIS — J47.9 BRONCHIECTASIS WITHOUT COMPLICATION: ICD-10-CM

## 2023-11-20 DIAGNOSIS — J45.40 MODERATE PERSISTENT ASTHMA WITHOUT COMPLICATION: Primary | ICD-10-CM

## 2023-11-20 PROCEDURE — 3075F PR MOST RECENT SYSTOLIC BLOOD PRESS GE 130-139MM HG: ICD-10-PCS | Mod: CPTII,S$GLB,, | Performed by: INTERNAL MEDICINE

## 2023-11-20 PROCEDURE — 3044F HG A1C LEVEL LT 7.0%: CPT | Mod: CPTII,S$GLB,, | Performed by: INTERNAL MEDICINE

## 2023-11-20 PROCEDURE — 3075F SYST BP GE 130 - 139MM HG: CPT | Mod: CPTII,S$GLB,, | Performed by: INTERNAL MEDICINE

## 2023-11-20 PROCEDURE — 1101F PR PT FALLS ASSESS DOC 0-1 FALLS W/OUT INJ PAST YR: ICD-10-PCS | Mod: CPTII,S$GLB,, | Performed by: INTERNAL MEDICINE

## 2023-11-20 PROCEDURE — 3044F PR MOST RECENT HEMOGLOBIN A1C LEVEL <7.0%: ICD-10-PCS | Mod: CPTII,S$GLB,, | Performed by: INTERNAL MEDICINE

## 2023-11-20 PROCEDURE — 4010F ACE/ARB THERAPY RXD/TAKEN: CPT | Mod: CPTII,S$GLB,, | Performed by: INTERNAL MEDICINE

## 2023-11-20 PROCEDURE — 82784 ASSAY IGA/IGD/IGG/IGM EACH: CPT | Mod: 91 | Performed by: INTERNAL MEDICINE

## 2023-11-20 PROCEDURE — 3288F PR FALLS RISK ASSESSMENT DOCUMENTED: ICD-10-PCS | Mod: CPTII,S$GLB,, | Performed by: INTERNAL MEDICINE

## 2023-11-20 PROCEDURE — 1101F PT FALLS ASSESS-DOCD LE1/YR: CPT | Mod: CPTII,S$GLB,, | Performed by: INTERNAL MEDICINE

## 2023-11-20 PROCEDURE — 3079F PR MOST RECENT DIASTOLIC BLOOD PRESSURE 80-89 MM HG: ICD-10-PCS | Mod: CPTII,S$GLB,, | Performed by: INTERNAL MEDICINE

## 2023-11-20 PROCEDURE — 99999 PR PBB SHADOW E&M-EST. PATIENT-LVL III: CPT | Mod: PBBFAC,,, | Performed by: INTERNAL MEDICINE

## 2023-11-20 PROCEDURE — 3008F BODY MASS INDEX DOCD: CPT | Mod: CPTII,S$GLB,, | Performed by: INTERNAL MEDICINE

## 2023-11-20 PROCEDURE — 3066F NEPHROPATHY DOC TX: CPT | Mod: CPTII,S$GLB,, | Performed by: INTERNAL MEDICINE

## 2023-11-20 PROCEDURE — 4010F PR ACE/ARB THEARPY RXD/TAKEN: ICD-10-PCS | Mod: CPTII,S$GLB,, | Performed by: INTERNAL MEDICINE

## 2023-11-20 PROCEDURE — 99204 PR OFFICE/OUTPT VISIT, NEW, LEVL IV, 45-59 MIN: ICD-10-PCS | Mod: S$GLB,,, | Performed by: INTERNAL MEDICINE

## 2023-11-20 PROCEDURE — 36415 COLL VENOUS BLD VENIPUNCTURE: CPT | Performed by: INTERNAL MEDICINE

## 2023-11-20 PROCEDURE — 1159F PR MEDICATION LIST DOCUMENTED IN MEDICAL RECORD: ICD-10-PCS | Mod: CPTII,S$GLB,, | Performed by: INTERNAL MEDICINE

## 2023-11-20 PROCEDURE — 3061F PR NEG MICROALBUMINURIA RESULT DOCUMENTED/REVIEW: ICD-10-PCS | Mod: CPTII,S$GLB,, | Performed by: INTERNAL MEDICINE

## 2023-11-20 PROCEDURE — 1159F MED LIST DOCD IN RCRD: CPT | Mod: CPTII,S$GLB,, | Performed by: INTERNAL MEDICINE

## 2023-11-20 PROCEDURE — 99204 OFFICE O/P NEW MOD 45 MIN: CPT | Mod: S$GLB,,, | Performed by: INTERNAL MEDICINE

## 2023-11-20 PROCEDURE — 99999 PR PBB SHADOW E&M-EST. PATIENT-LVL III: ICD-10-PCS | Mod: PBBFAC,,, | Performed by: INTERNAL MEDICINE

## 2023-11-20 PROCEDURE — 3066F PR DOCUMENTATION OF TREATMENT FOR NEPHROPATHY: ICD-10-PCS | Mod: CPTII,S$GLB,, | Performed by: INTERNAL MEDICINE

## 2023-11-20 PROCEDURE — 86774 TETANUS ANTIBODY: CPT | Performed by: INTERNAL MEDICINE

## 2023-11-20 PROCEDURE — 3008F PR BODY MASS INDEX (BMI) DOCUMENTED: ICD-10-PCS | Mod: CPTII,S$GLB,, | Performed by: INTERNAL MEDICINE

## 2023-11-20 PROCEDURE — 3288F FALL RISK ASSESSMENT DOCD: CPT | Mod: CPTII,S$GLB,, | Performed by: INTERNAL MEDICINE

## 2023-11-20 PROCEDURE — 3079F DIAST BP 80-89 MM HG: CPT | Mod: CPTII,S$GLB,, | Performed by: INTERNAL MEDICINE

## 2023-11-20 PROCEDURE — 82784 ASSAY IGA/IGD/IGG/IGM EACH: CPT | Performed by: INTERNAL MEDICINE

## 2023-11-20 PROCEDURE — 3061F NEG MICROALBUMINURIA REV: CPT | Mod: CPTII,S$GLB,, | Performed by: INTERNAL MEDICINE

## 2023-11-20 RX ORDER — FLUTICASONE PROPIONATE AND SALMETEROL 250; 50 UG/1; UG/1
1 POWDER RESPIRATORY (INHALATION) 2 TIMES DAILY
Qty: 60 EACH | Refills: 11 | Status: SHIPPED | OUTPATIENT
Start: 2023-11-20 | End: 2024-11-19

## 2023-11-20 RX ORDER — PROMETHAZINE HYDROCHLORIDE AND DEXTROMETHORPHAN HYDROBROMIDE 6.25; 15 MG/5ML; MG/5ML
SYRUP ORAL
Qty: 240 ML | Refills: 1 | Status: SHIPPED | OUTPATIENT
Start: 2023-11-20 | End: 2024-02-19 | Stop reason: SDUPTHER

## 2023-11-20 NOTE — PATIENT INSTRUCTIONS
CT reviewed- with mild bronchiectasis, condition causing dilated bronchial tubes which may make you prone to mucous, cough and respiratory infections  Some nodules in the lungs seen on your scan may be infectious- may be residual from COVID infection or may have MAC or similar slow growing infection in the lungs  Suspect asthma/reactive airways too- try advair inhaler 1 puff twice daily at least 30 days to see if it helps your cough.  Use promethazine cough med sparingly as needed for cough  Submit sputum samples to the lab  Pulmonary function testing  Go to the lab for blood work  For rib pains physical therapy may help. Try warm compresses, stretching, tylenol and ibuprofen as needed
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
## 2023-11-21 ENCOUNTER — OFFICE VISIT (OUTPATIENT)
Dept: PODIATRY | Facility: CLINIC | Age: 68
End: 2023-11-21
Payer: MEDICARE

## 2023-11-21 VITALS
HEART RATE: 85 BPM | BODY MASS INDEX: 28.82 KG/M2 | WEIGHT: 173 LBS | HEIGHT: 65 IN | DIASTOLIC BLOOD PRESSURE: 69 MMHG | SYSTOLIC BLOOD PRESSURE: 122 MMHG

## 2023-11-21 DIAGNOSIS — M76.61 ACHILLES TENDINITIS OF RIGHT LOWER EXTREMITY: Primary | ICD-10-CM

## 2023-11-21 DIAGNOSIS — E11.9 TYPE 2 DIABETES MELLITUS WITHOUT COMPLICATION, WITHOUT LONG-TERM CURRENT USE OF INSULIN: ICD-10-CM

## 2023-11-21 DIAGNOSIS — M77.31 CALCANEAL SPUR OF RIGHT FOOT: ICD-10-CM

## 2023-11-21 DIAGNOSIS — E11.9 COMPREHENSIVE DIABETIC FOOT EXAMINATION, TYPE 2 DM, ENCOUNTER FOR: ICD-10-CM

## 2023-11-21 PROCEDURE — 3061F PR NEG MICROALBUMINURIA RESULT DOCUMENTED/REVIEW: ICD-10-PCS | Mod: CPTII,S$GLB,, | Performed by: PODIATRIST

## 2023-11-21 PROCEDURE — 1101F PR PT FALLS ASSESS DOC 0-1 FALLS W/OUT INJ PAST YR: ICD-10-PCS | Mod: CPTII,S$GLB,, | Performed by: PODIATRIST

## 2023-11-21 PROCEDURE — 3288F PR FALLS RISK ASSESSMENT DOCUMENTED: ICD-10-PCS | Mod: CPTII,S$GLB,, | Performed by: PODIATRIST

## 2023-11-21 PROCEDURE — 3078F DIAST BP <80 MM HG: CPT | Mod: CPTII,S$GLB,, | Performed by: PODIATRIST

## 2023-11-21 PROCEDURE — 4010F ACE/ARB THERAPY RXD/TAKEN: CPT | Mod: CPTII,S$GLB,, | Performed by: PODIATRIST

## 2023-11-21 PROCEDURE — 3074F PR MOST RECENT SYSTOLIC BLOOD PRESSURE < 130 MM HG: ICD-10-PCS | Mod: CPTII,S$GLB,, | Performed by: PODIATRIST

## 2023-11-21 PROCEDURE — 1101F PT FALLS ASSESS-DOCD LE1/YR: CPT | Mod: CPTII,S$GLB,, | Performed by: PODIATRIST

## 2023-11-21 PROCEDURE — 1126F PR PAIN SEVERITY QUANTIFIED, NO PAIN PRESENT: ICD-10-PCS | Mod: CPTII,S$GLB,, | Performed by: PODIATRIST

## 2023-11-21 PROCEDURE — 3074F SYST BP LT 130 MM HG: CPT | Mod: CPTII,S$GLB,, | Performed by: PODIATRIST

## 2023-11-21 PROCEDURE — 1159F MED LIST DOCD IN RCRD: CPT | Mod: CPTII,S$GLB,, | Performed by: PODIATRIST

## 2023-11-21 PROCEDURE — 99213 OFFICE O/P EST LOW 20 MIN: CPT | Mod: S$GLB,,, | Performed by: PODIATRIST

## 2023-11-21 PROCEDURE — 3078F PR MOST RECENT DIASTOLIC BLOOD PRESSURE < 80 MM HG: ICD-10-PCS | Mod: CPTII,S$GLB,, | Performed by: PODIATRIST

## 2023-11-21 PROCEDURE — 99213 PR OFFICE/OUTPT VISIT, EST, LEVL III, 20-29 MIN: ICD-10-PCS | Mod: S$GLB,,, | Performed by: PODIATRIST

## 2023-11-21 PROCEDURE — 1126F AMNT PAIN NOTED NONE PRSNT: CPT | Mod: CPTII,S$GLB,, | Performed by: PODIATRIST

## 2023-11-21 PROCEDURE — 1160F RVW MEDS BY RX/DR IN RCRD: CPT | Mod: CPTII,S$GLB,, | Performed by: PODIATRIST

## 2023-11-21 PROCEDURE — 3008F PR BODY MASS INDEX (BMI) DOCUMENTED: ICD-10-PCS | Mod: CPTII,S$GLB,, | Performed by: PODIATRIST

## 2023-11-21 PROCEDURE — 1159F PR MEDICATION LIST DOCUMENTED IN MEDICAL RECORD: ICD-10-PCS | Mod: CPTII,S$GLB,, | Performed by: PODIATRIST

## 2023-11-21 PROCEDURE — 3044F PR MOST RECENT HEMOGLOBIN A1C LEVEL <7.0%: ICD-10-PCS | Mod: CPTII,S$GLB,, | Performed by: PODIATRIST

## 2023-11-21 PROCEDURE — 3288F FALL RISK ASSESSMENT DOCD: CPT | Mod: CPTII,S$GLB,, | Performed by: PODIATRIST

## 2023-11-21 PROCEDURE — 4010F PR ACE/ARB THEARPY RXD/TAKEN: ICD-10-PCS | Mod: CPTII,S$GLB,, | Performed by: PODIATRIST

## 2023-11-21 PROCEDURE — 3044F HG A1C LEVEL LT 7.0%: CPT | Mod: CPTII,S$GLB,, | Performed by: PODIATRIST

## 2023-11-21 PROCEDURE — 3066F PR DOCUMENTATION OF TREATMENT FOR NEPHROPATHY: ICD-10-PCS | Mod: CPTII,S$GLB,, | Performed by: PODIATRIST

## 2023-11-21 PROCEDURE — 99999 PR PBB SHADOW E&M-EST. PATIENT-LVL III: ICD-10-PCS | Mod: PBBFAC,,, | Performed by: PODIATRIST

## 2023-11-21 PROCEDURE — 1160F PR REVIEW ALL MEDS BY PRESCRIBER/CLIN PHARMACIST DOCUMENTED: ICD-10-PCS | Mod: CPTII,S$GLB,, | Performed by: PODIATRIST

## 2023-11-21 PROCEDURE — 3061F NEG MICROALBUMINURIA REV: CPT | Mod: CPTII,S$GLB,, | Performed by: PODIATRIST

## 2023-11-21 PROCEDURE — 3066F NEPHROPATHY DOC TX: CPT | Mod: CPTII,S$GLB,, | Performed by: PODIATRIST

## 2023-11-21 PROCEDURE — 3008F BODY MASS INDEX DOCD: CPT | Mod: CPTII,S$GLB,, | Performed by: PODIATRIST

## 2023-11-21 PROCEDURE — 99999 PR PBB SHADOW E&M-EST. PATIENT-LVL III: CPT | Mod: PBBFAC,,, | Performed by: PODIATRIST

## 2023-11-21 NOTE — PROGRESS NOTES
Subjective:       Patient ID: Keesha Quezada is a 68 y.o. female.    Chief Complaint: No chief complaint on file.  Patient presents today for follow-up of Achilles tendinitis right with posterior calcaneal exostosis right.  Patient is a type 2 diabetic.    Past Medical History:   Diagnosis Date    Diabetes mellitus     Hyperlipidemia     Hypertension     Unspecified macular degeneration     Left eye     Past Surgical History:   Procedure Laterality Date    ARTHROSCOPIC CHONDROPLASTY OF KNEE JOINT Left 12/3/2019    Procedure: ARTHROSCOPY, KNEE, WITH CHONDROPLASTY;  Surgeon: Jayme Lucas DO;  Location: [facility] OR;  Service: Orthopedics;  Laterality: Left;     SECTION      KNEE ARTHROSCOPY W/ MENISCECTOMY Left 12/3/2019    Procedure: ARTHROSCOPY, KNEE, WITH MENISCECTOMY;  Surgeon: Jayme Lucas DO;  Location: [facility] OR;  Service: Orthopedics;  Laterality: Left;    KNEE ARTHROSCOPY W/ PLICA EXCISION Left 12/3/2019    Procedure: EXCISION, PLICA, KNEE, ARTHROSCOPIC;  Surgeon: Jayme Lucas DO;  Location: [facility] OR;  Service: Orthopedics;  Laterality: Left;         Social History     Socioeconomic History    Marital status:    Tobacco Use    Smoking status: Never    Smokeless tobacco: Never   Substance and Sexual Activity    Alcohol use: Yes     Comment: occ    Drug use: No    Sexual activity: Yes     Partners: Male       Current Outpatient Medications   Medication Sig Dispense Refill    amLODIPine (NORVASC) 5 MG tablet Take 1 tablet (5 mg total) by mouth once daily. 90 tablet 3    atorvastatin (LIPITOR) 40 MG tablet Take 1 tablet (40 mg total) by mouth once daily. 90 tablet 3    diclofenac sodium (VOLTAREN) 1 % Gel APPLY TO AFFECTED AREA TWICE A DAY (MASSAGE INTO AREA FOR 2 MINUTES)  5    fluticasone-salmeterol diskus inhaler 250-50 mcg Inhale 1 puff into the lungs 2 (two) times daily. Controller 60 each 11    glimepiride (AMARYL) 2 MG tablet TAKE 1 TABLET EVERY MORNING  BEFORE  BREKAFAST 90 tablet 3  "   losartan (COZAAR) 50 MG tablet Take 1 tablet (50 mg total) by mouth once daily. 90 tablet 3    promethazine-dextromethorphan (PROMETHAZINE-DM) 6.25-15 mg/5 mL Syrp TAKE 5 MLS BY MOUTH EVERY 6 HOURS AS NEEDED FOR COUGH. Strength: 6.25-15 mg/5 mL 240 mL 1     No current facility-administered medications for this visit.     Review of patient's allergies indicates:   Allergen Reactions    Cortisone      Hearing Loss       Review of Systems   Musculoskeletal:  Positive for arthralgias, gait problem and joint swelling.   All other systems reviewed and are negative.      Objective:      Vitals:    11/21/23 1437   BP: 122/69   BP Location: Right arm   Patient Position: Sitting   Pulse: 85   Weight: 78.5 kg (173 lb)   Height: 5' 5" (1.651 m)     Physical Exam  Vitals and nursing note reviewed.   Constitutional:       Appearance: Normal appearance.   Cardiovascular:      Pulses:           Dorsalis pedis pulses are 2+ on the right side and 2+ on the left side.        Posterior tibial pulses are 1+ on the right side and 1+ on the left side.   Pulmonary:      Effort: Pulmonary effort is normal.   Musculoskeletal:         General: Swelling, tenderness and deformity present.      Right foot: Deformity present.        Feet:    Feet:      Right foot:      Protective Sensation: 2 sites tested.  2 sites sensed.      Skin integrity: Erythema and warmth present.      Left foot:      Protective Sensation: 2 sites tested.  2 sites sensed.   Skin:     Capillary Refill: Capillary refill takes less than 2 seconds.      Findings: Erythema present.   Neurological:      General: No focal deficit present.      Mental Status: She is alert.   Psychiatric:         Mood and Affect: Mood normal.         Behavior: Behavior normal.                                      Assessment:       1. Achilles tendinitis of right lower extremity    2. Calcaneal spur of right foot    3. Type 2 diabetes mellitus without complication, without long-term current use " of insulin    4. Comprehensive diabetic foot examination, type 2 DM, encounter for        Plan:       Patient presents today for follow-up of Achilles tendinitis right with posterior calcaneal exostosis right.  Patient states she is doing a lot better she states she is been on her feet a lot and she is got a lot less discomfort the most notable improvement is the decreased skin temperature and decreased edema at the posterior aspect of the patient's right foot.  Patient has been wearing heels quite a bit she states everything that she is worn has some degree of a heel and she is tried to avoid any closed back shoes.  I did advised the patient today I would continue to avoid the closed back shoes continue to wear something with a little bit of a heel this will help to keep the pressure off of the Achilles tendon and allow the inflammation to continue to resolve she is been applying Voltaren gel to the area which has also helped she states the Toradol injection IM on the right side did help significantly within several hours of receiving the injection.  I do plan to see the patient as needed for follow-up I reviewed the patient's x-rays with her at length and in detail the patient does have significant spurring that extends from the plantar calcaneus at the insertion of the plantar fascia to the insertion of the Achilles tendon right we reviewed these x-rays together and I advised the patient this is something that she can live with as long as she avoids over stretching the Achilles or shoes that are rubbing or too tight on the back of the Achilles and posterior calcaneus.  Patient was in understanding and agreement with everything discussed follow-up will be as needed if she does get a flare up or starts having discomfort she is to contact us immediately this is definitely not something to ignore as it does over time have a tendency to week in the Achilles tendon.  This note was created using M*Modal voice  recognition software that occasionally misinterpreted phrases or words.

## 2023-11-22 LAB
IGA SERPL-MCNC: 207 MG/DL (ref 61–356)
IGG SERPL-MCNC: 965 MG/DL (ref 767–1590)
IGM SERPL-MCNC: 92 MG/DL (ref 37–286)

## 2023-11-29 LAB
C DIPHTHERIAE AB SER IA-ACNC: 0.15 IU/ML
C TETANI TOXOID AB SER-ACNC: 2.3 IU/ML
DEPRECATED S PNEUM23 IGG SER-MCNC: 9.9 UG/ML
DEPRECATED S PNEUM4 IGG SER-MCNC: 9.5 UG/ML
HAEM INFLU B IGG SER IA-MCNC: 0.17 MCG/ML
S PN DA SERO 19F IGG SER-MCNC: 0.7 UG/ML
S PNEUM DA 1 IGG SER-MCNC: 0.3 UG/ML
S PNEUM DA 14 IGG SER-MCNC: 0.4
S PNEUM DA 18C IGG SER-MCNC: 4
S PNEUM DA 19A IGG SER-MCNC: 2.4 UG/ML
S PNEUM DA 3 IGG SER-MCNC: <0.3 UG/ML
S PNEUM DA 5 IGG SER-MCNC: 5.9 UG/ML
S PNEUM DA 6A IGG SER-MCNC: 1.3 UG/ML
S PNEUM DA 6B IGG SER-MCNC: 5.9 UG/ML
S PNEUM DA 7F IGG SER-MCNC: 6.6 UG/ML
S PNEUM DA 9V IGG SER-MCNC: 10.2 UG/ML

## 2023-11-30 ENCOUNTER — HOSPITAL ENCOUNTER (OUTPATIENT)
Dept: PULMONOLOGY | Facility: HOSPITAL | Age: 68
Discharge: HOME | End: 2023-11-30
Attending: INTERNAL MEDICINE
Payer: MEDICARE

## 2023-11-30 DIAGNOSIS — J45.40 MODERATE PERSISTENT ASTHMA WITHOUT COMPLICATION: Primary | ICD-10-CM

## 2023-11-30 DIAGNOSIS — J45.40 MODERATE PERSISTENT ASTHMA WITHOUT COMPLICATION: ICD-10-CM

## 2023-11-30 LAB
DLCO SINGLE BREATH LLN: 16.32
DLCO SINGLE BREATH PRE REF: 70.3 %
DLCO SINGLE BREATH REF: 22.05
DLCOC SBVA LLN: 2.92
DLCOC SBVA REF: 4.32
DLCOC SINGLE BREATH LLN: 16.32
DLCOC SINGLE BREATH REF: 22.05
DLCOVA LLN: 2.92
DLCOVA PRE REF: 95 %
DLCOVA PRE: 4.1 ML/(MIN*MMHG*L) (ref 2.92–5.72)
DLCOVA REF: 4.32
ERV LLN: -16449.33
ERV PRE REF: 106.6 %
ERV REF: 0.67
FEF 25 75 CHG: -2.1 %
FEF 25 75 LLN: 0.9
FEF 25 75 POST REF: 56.3 %
FEF 25 75 PRE REF: 57.5 %
FEF 25 75 REF: 1.94
FET100 CHG: -4.6 %
FEV1 CHG: 0.4 %
FEV1 FVC CHG: 1.6 %
FEV1 FVC LLN: 65
FEV1 FVC POST REF: 88.4 %
FEV1 FVC PRE REF: 87 %
FEV1 FVC REF: 78
FEV1 LLN: 1.68
FEV1 POST REF: 88.2 %
FEV1 PRE REF: 87.8 %
FEV1 REF: 2.3
FRCPLETH LLN: 1.94
FRCPLETH PREREF: 88.9 %
FRCPLETH REF: 2.77
FVC CHG: -1.2 %
FVC LLN: 2.18
FVC POST REF: 98.9 %
FVC PRE REF: 100.2 %
FVC REF: 2.97
IVC PRE: 2.6 L (ref 2.18–3.81)
IVC SINGLE BREATH LLN: 2.18
IVC SINGLE BREATH PRE REF: 87.5 %
IVC SINGLE BREATH REF: 2.97
MVV LLN: 75
MVV PRE REF: 83 %
MVV REF: 88
PEF CHG: 10.9 %
PEF LLN: 4.11
PEF POST REF: 95.9 %
PEF PRE REF: 86.5 %
PEF REF: 5.88
POST FEF 25 75: 1.09 L/S (ref 0.9–3.4)
POST FET 100: 11.33 SEC
POST FEV1 FVC: 69 % (ref 64.91–89.39)
POST FEV1: 2.03 L (ref 1.68–2.9)
POST FVC: 2.94 L (ref 2.18–3.81)
POST PEF: 5.63 L/S (ref 4.11–7.64)
PRE DLCO: 15.51 ML/(MIN*MMHG) (ref 16.32–27.78)
PRE ERV: 0.72 L (ref -16449.33–16450.67)
PRE FEF 25 75: 1.11 L/S (ref 0.9–3.4)
PRE FET 100: 11.88 SEC
PRE FEV1 FVC: 67.93 % (ref 64.91–89.39)
PRE FEV1: 2.02 L (ref 1.68–2.9)
PRE FRC PL: 2.46 L (ref 1.94–3.59)
PRE FVC: 2.98 L (ref 2.18–3.81)
PRE MVV: 72.93 L/MIN (ref 74.68–101.04)
PRE PEF: 5.08 L/S (ref 4.11–7.64)
PRE RV: 1.74 L (ref 1.52–2.67)
PRE TLC: 4.72 L (ref 4.12–6.09)
RAW LLN: 3.06
RAW PRE REF: 79.7 %
RAW PRE: 2.44 CMH2O*S/L (ref 3.06–3.06)
RAW REF: 3.06
RV LLN: 1.52
RV PRE REF: 83.2 %
RV REF: 2.09
RVTLC LLN: 33
RVTLC PRE REF: 87 %
RVTLC PRE: 36.9 % (ref 32.83–52.01)
RVTLC REF: 42
TLC LLN: 4.12
TLC PRE REF: 92.4 %
TLC REF: 5.11
VA PRE: 3.78 L (ref 4.96–4.96)
VA SINGLE BREATH LLN: 4.96
VA SINGLE BREATH PRE REF: 76.3 %
VA SINGLE BREATH REF: 4.96
VC LLN: 2.18
VC PRE REF: 100.2 %
VC PRE: 2.98 L (ref 2.18–3.81)
VC REF: 2.97

## 2023-11-30 PROCEDURE — 94726 PULM FUNCT TST PLETHYSMOGRAP: ICD-10-PCS | Mod: 26,,, | Performed by: INTERNAL MEDICINE

## 2023-11-30 PROCEDURE — 94060 EVALUATION OF WHEEZING: CPT

## 2023-11-30 PROCEDURE — 94060 EVALUATION OF WHEEZING: CPT | Mod: 26,,, | Performed by: INTERNAL MEDICINE

## 2023-11-30 PROCEDURE — 94729 PR C02/MEMBANE DIFFUSE CAPACITY: ICD-10-PCS | Mod: 26,,, | Performed by: INTERNAL MEDICINE

## 2023-11-30 PROCEDURE — 94060 PR EVAL OF BRONCHOSPASM: ICD-10-PCS | Mod: 26,,, | Performed by: INTERNAL MEDICINE

## 2023-11-30 PROCEDURE — 94729 DIFFUSING CAPACITY: CPT

## 2023-11-30 PROCEDURE — 94726 PLETHYSMOGRAPHY LUNG VOLUMES: CPT | Mod: 26,,, | Performed by: INTERNAL MEDICINE

## 2023-11-30 PROCEDURE — 94726 PLETHYSMOGRAPHY LUNG VOLUMES: CPT

## 2023-11-30 PROCEDURE — 94729 DIFFUSING CAPACITY: CPT | Mod: 26,,, | Performed by: INTERNAL MEDICINE

## 2023-11-30 RX ORDER — ALBUTEROL SULFATE 2.5 MG/.5ML
SOLUTION RESPIRATORY (INHALATION)
Status: DISCONTINUED
Start: 2023-11-30 | End: 2023-12-01 | Stop reason: HOSPADM

## 2023-11-30 RX ORDER — PNEUMOCOCCAL 20-VALENT CONJUGATE VACCINE 2.2; 2.2; 2.2; 2.2; 2.2; 2.2; 2.2; 2.2; 2.2; 2.2; 2.2; 2.2; 2.2; 2.2; 2.2; 2.2; 4.4; 2.2; 2.2; 2.2 UG/.5ML; UG/.5ML; UG/.5ML; UG/.5ML; UG/.5ML; UG/.5ML; UG/.5ML; UG/.5ML; UG/.5ML; UG/.5ML; UG/.5ML; UG/.5ML; UG/.5ML; UG/.5ML; UG/.5ML; UG/.5ML; UG/.5ML; UG/.5ML; UG/.5ML; UG/.5ML
0.5 INJECTION, SUSPENSION INTRAMUSCULAR ONCE
Qty: 0.5 ML | Refills: 0 | Status: SHIPPED | OUTPATIENT
Start: 2023-11-30 | End: 2023-11-30 | Stop reason: SDUPTHER

## 2023-12-01 RX ORDER — PNEUMOCOCCAL 20-VALENT CONJUGATE VACCINE 2.2; 2.2; 2.2; 2.2; 2.2; 2.2; 2.2; 2.2; 2.2; 2.2; 2.2; 2.2; 2.2; 2.2; 2.2; 2.2; 4.4; 2.2; 2.2; 2.2 UG/.5ML; UG/.5ML; UG/.5ML; UG/.5ML; UG/.5ML; UG/.5ML; UG/.5ML; UG/.5ML; UG/.5ML; UG/.5ML; UG/.5ML; UG/.5ML; UG/.5ML; UG/.5ML; UG/.5ML; UG/.5ML; UG/.5ML; UG/.5ML; UG/.5ML; UG/.5ML
0.5 INJECTION, SUSPENSION INTRAMUSCULAR ONCE
Qty: 0.5 ML | Refills: 0 | Status: SHIPPED | OUTPATIENT
Start: 2023-12-01 | End: 2023-12-01

## 2024-02-19 ENCOUNTER — TELEPHONE (OUTPATIENT)
Dept: FAMILY MEDICINE | Facility: CLINIC | Age: 69
End: 2024-02-19
Payer: MEDICARE

## 2024-02-19 ENCOUNTER — PATIENT MESSAGE (OUTPATIENT)
Dept: FAMILY MEDICINE | Facility: CLINIC | Age: 69
End: 2024-02-19
Payer: MEDICARE

## 2024-02-19 DIAGNOSIS — R05.3 CHRONIC COUGH: ICD-10-CM

## 2024-02-19 RX ORDER — PROMETHAZINE HYDROCHLORIDE AND DEXTROMETHORPHAN HYDROBROMIDE 6.25; 15 MG/5ML; MG/5ML
SYRUP ORAL
Qty: 240 ML | Refills: 1 | Status: SHIPPED | OUTPATIENT
Start: 2024-02-19

## 2024-03-11 ENCOUNTER — PATIENT MESSAGE (OUTPATIENT)
Dept: FAMILY MEDICINE | Facility: CLINIC | Age: 69
End: 2024-03-11
Payer: MEDICARE

## 2024-03-20 ENCOUNTER — PATIENT MESSAGE (OUTPATIENT)
Dept: ADMINISTRATIVE | Facility: HOSPITAL | Age: 69
End: 2024-03-20
Payer: MEDICARE

## 2024-03-26 ENCOUNTER — OFFICE VISIT (OUTPATIENT)
Dept: PODIATRY | Facility: CLINIC | Age: 69
End: 2024-03-26
Payer: MEDICARE

## 2024-03-26 VITALS — BODY MASS INDEX: 28.83 KG/M2 | WEIGHT: 173.06 LBS | HEIGHT: 65 IN

## 2024-03-26 DIAGNOSIS — E11.9 TYPE 2 DIABETES MELLITUS WITHOUT COMPLICATION, WITHOUT LONG-TERM CURRENT USE OF INSULIN: ICD-10-CM

## 2024-03-26 DIAGNOSIS — M76.61 ACHILLES BURSITIS OF RIGHT LOWER EXTREMITY: Primary | ICD-10-CM

## 2024-03-26 DIAGNOSIS — M77.31 CALCANEAL SPUR OF RIGHT FOOT: ICD-10-CM

## 2024-03-26 DIAGNOSIS — E11.9 COMPREHENSIVE DIABETIC FOOT EXAMINATION, TYPE 2 DM, ENCOUNTER FOR: ICD-10-CM

## 2024-03-26 PROCEDURE — 1126F AMNT PAIN NOTED NONE PRSNT: CPT | Mod: CPTII,S$GLB,, | Performed by: PODIATRIST

## 2024-03-26 PROCEDURE — 99999 PR PBB SHADOW E&M-EST. PATIENT-LVL IV: CPT | Mod: PBBFAC,,, | Performed by: PODIATRIST

## 2024-03-26 PROCEDURE — 1160F RVW MEDS BY RX/DR IN RCRD: CPT | Mod: CPTII,S$GLB,, | Performed by: PODIATRIST

## 2024-03-26 PROCEDURE — 3008F BODY MASS INDEX DOCD: CPT | Mod: CPTII,S$GLB,, | Performed by: PODIATRIST

## 2024-03-26 PROCEDURE — 99213 OFFICE O/P EST LOW 20 MIN: CPT | Mod: S$GLB,,, | Performed by: PODIATRIST

## 2024-03-26 PROCEDURE — 1101F PT FALLS ASSESS-DOCD LE1/YR: CPT | Mod: CPTII,S$GLB,, | Performed by: PODIATRIST

## 2024-03-26 PROCEDURE — 3288F FALL RISK ASSESSMENT DOCD: CPT | Mod: CPTII,S$GLB,, | Performed by: PODIATRIST

## 2024-03-26 PROCEDURE — 1159F MED LIST DOCD IN RCRD: CPT | Mod: CPTII,S$GLB,, | Performed by: PODIATRIST

## 2024-03-26 RX ORDER — CHLORHEXIDINE GLUCONATE ORAL RINSE 1.2 MG/ML
15 SOLUTION DENTAL 2 TIMES DAILY
COMMUNITY
Start: 2023-12-23

## 2024-03-27 NOTE — PROGRESS NOTES
Subjective:       Patient ID: Keesha Quezada is a 68 y.o. female.    Chief Complaint: Tendonitis and Swelling  Patient presents today for follow-up of Achilles tendinitis right with posterior calcaneal exostosis right.  Patient is a type 2 diabetic.    Past Medical History:   Diagnosis Date    Diabetes mellitus     Hyperlipidemia     Hypertension     Unspecified macular degeneration     Left eye     Past Surgical History:   Procedure Laterality Date    ARTHROSCOPIC CHONDROPLASTY OF KNEE JOINT Left 12/3/2019    Procedure: ARTHROSCOPY, KNEE, WITH CHONDROPLASTY;  Surgeon: Jayme Lucas DO;  Location: [facility] OR;  Service: Orthopedics;  Laterality: Left;     SECTION      KNEE ARTHROSCOPY W/ MENISCECTOMY Left 12/3/2019    Procedure: ARTHROSCOPY, KNEE, WITH MENISCECTOMY;  Surgeon: Jayme Lucas DO;  Location: [facility] OR;  Service: Orthopedics;  Laterality: Left;    KNEE ARTHROSCOPY W/ PLICA EXCISION Left 12/3/2019    Procedure: EXCISION, PLICA, KNEE, ARTHROSCOPIC;  Surgeon: Jayme Lucas DO;  Location: [facility] OR;  Service: Orthopedics;  Laterality: Left;         Social History     Socioeconomic History    Marital status:    Tobacco Use    Smoking status: Never    Smokeless tobacco: Never   Substance and Sexual Activity    Alcohol use: Yes     Comment: occ    Drug use: No    Sexual activity: Yes     Partners: Male       Current Outpatient Medications   Medication Sig Dispense Refill    amLODIPine (NORVASC) 5 MG tablet Take 1 tablet (5 mg total) by mouth once daily. 90 tablet 3    atorvastatin (LIPITOR) 40 MG tablet Take 1 tablet (40 mg total) by mouth once daily. 90 tablet 3    chlorhexidine (PERIDEX) 0.12 % solution 15 mLs 2 (two) times daily.      diclofenac sodium (VOLTAREN) 1 % Gel APPLY TO AFFECTED AREA TWICE A DAY (MASSAGE INTO AREA FOR 2 MINUTES)  5    fluticasone-salmeterol diskus inhaler 250-50 mcg Inhale 1 puff into the lungs 2 (two) times daily. Controller 60 each 11    glimepiride (AMARYL) 2  "MG tablet TAKE 1 TABLET EVERY MORNING  BEFORE  BREKAFAST 90 tablet 3    losartan (COZAAR) 50 MG tablet Take 1 tablet (50 mg total) by mouth once daily. 90 tablet 3    promethazine-dextromethorphan (PROMETHAZINE-DM) 6.25-15 mg/5 mL Syrp TAKE 5 MLS BY MOUTH EVERY 6 HOURS AS NEEDED FOR COUGH. Strength: 6.25-15 mg/5 mL 240 mL 1     No current facility-administered medications for this visit.     Review of patient's allergies indicates:   Allergen Reactions    Cortisone      Hearing Loss       Review of Systems   Musculoskeletal:  Positive for arthralgias, gait problem and joint swelling.   All other systems reviewed and are negative.      Objective:      Vitals:    03/26/24 1156   BP: (P) 136/85   BP Location: (P) Right arm   Patient Position: (P) Sitting   Pulse: (P) 86   Weight: 78.5 kg (173 lb 1 oz)   Height: 5' 5" (1.651 m)     Physical Exam  Vitals and nursing note reviewed.   Constitutional:       Appearance: Normal appearance.   Cardiovascular:      Pulses:           Dorsalis pedis pulses are 2+ on the right side and 2+ on the left side.        Posterior tibial pulses are 1+ on the right side and 1+ on the left side.   Pulmonary:      Effort: Pulmonary effort is normal.   Musculoskeletal:         General: Swelling, tenderness and deformity present.      Right foot: Deformity present.        Feet:    Feet:      Right foot:      Protective Sensation: 2 sites tested.  2 sites sensed.      Skin integrity: Erythema and warmth present.      Left foot:      Protective Sensation: 2 sites tested.  2 sites sensed.   Skin:     Capillary Refill: Capillary refill takes less than 2 seconds.      Findings: Erythema present.   Neurological:      General: No focal deficit present.      Mental Status: She is alert.   Psychiatric:         Mood and Affect: Mood normal.         Behavior: Behavior normal.                                                    Assessment:       1. Achilles bursitis of right lower extremity    2. Calcaneal " spur of right foot    3. Type 2 diabetes mellitus without complication, without long-term current use of insulin    4. Comprehensive diabetic foot examination, type 2 DM, encounter for        Plan:       Patient presents today for follow-up of Achilles tendinitis right with posterior calcaneal exostosis right.  Patient states she was doing fine however about 5-6 weeks ago she started having increased heel pain she states she has been doing a lot her significant other passed away she states she has had to move a lot of things do a lot of things around the house that she does not normally do work outside this seemed to flare up the back of her right heel.  Patient does wear open back tennis shoes so her shoes are not rubbing or irritating the area.  Patient states she was cleaning out his medication had some meloxicam 15 mg so she states for the past 3 days she has been taking this all of the swelling and discomfort has resolved.  Patient states the heel was worse with activity.  I did advised the patient she is going to have to be very careful about over stretching the Achilles clearly whatever she did over stretch cause inflammation in the Achilles likely irritated the spurring that she has.  Patient has had has previously been prescribed by another provider meloxicam 7.5 mg she states she is tolerating the 15 mg meloxicam I have advised her if she needs a prescription for this I can certainly provide her a prescription for short term use I did give the patient a compression sleeve so that when she has got a do these increased physical activity she puts the compression sleeve on it will give her better support it will also present prevent the inflammation and swelling over the Achilles tendon area.  Patient was advised if she has any flare up she needs to contact us immediately so we can get this settled down she is at risk for an Achilles tendon rupture because of chronic inflammation and certainly this is  definitely not something to ignore she also has extensive posterior calcaneal spurring.  Patient is doing better now we dispensed incised her for a compression sleeve I will plan to see her as needed for follow-up but if she has any flare up she is to contact us immediately again I have reminded the patient be careful about any over stretching bending squatting or walking on uneven ground she needs to make sure she is wearing good supportive shoes at all times.  Follow-up as needed.  Diabetic exam performed bilateral.  This note was created using [employer] voice recognition software that occasionally misinterpreted phrases or words.

## 2024-05-01 DIAGNOSIS — E11.9 TYPE 2 DIABETES MELLITUS WITHOUT COMPLICATION: ICD-10-CM

## 2024-05-23 ENCOUNTER — PATIENT MESSAGE (OUTPATIENT)
Dept: FAMILY MEDICINE | Facility: CLINIC | Age: 69
End: 2024-05-23
Payer: MEDICARE

## 2024-06-19 ENCOUNTER — PATIENT OUTREACH (OUTPATIENT)
Dept: ADMINISTRATIVE | Facility: HOSPITAL | Age: 69
End: 2024-06-19
Payer: MEDICARE

## 2024-06-19 DIAGNOSIS — Z12.31 ENCOUNTER FOR SCREENING MAMMOGRAM FOR BREAST CANCER: Primary | ICD-10-CM

## 2024-06-19 NOTE — PROGRESS NOTES
Population Health Chart Review & Patient Outreach Details      Additional [facility] Notes:               Updates Requested / Reviewed:      Updated Care Coordination Note, Care Everywhere, and Care Team Updated         Health Maintenance Topics Overdue:      VBHM Score: 2     Hemoglobin A1c  Mammogram    Pneumonia Vaccine  Shingles/Zoster Vaccine  RSV Vaccine                  Health Maintenance Topic(s) Outreach Outcomes & Actions Taken:    Breast Cancer Screening - Outreach Outcomes & Actions Taken  : Mammogram Order Placed

## 2024-07-23 ENCOUNTER — PATIENT MESSAGE (OUTPATIENT)
Dept: FAMILY MEDICINE | Facility: CLINIC | Age: 69
End: 2024-07-23
Payer: MEDICARE

## 2024-08-07 DIAGNOSIS — E78.2 MIXED HYPERLIPIDEMIA: ICD-10-CM

## 2024-08-07 DIAGNOSIS — E11.9 TYPE 2 DIABETES MELLITUS WITHOUT COMPLICATION, WITHOUT LONG-TERM CURRENT USE OF INSULIN: ICD-10-CM

## 2024-08-07 RX ORDER — ATORVASTATIN CALCIUM 40 MG/1
40 TABLET, FILM COATED ORAL DAILY
Qty: 90 TABLET | Refills: 0 | Status: SHIPPED | OUTPATIENT
Start: 2024-08-07

## 2024-08-07 RX ORDER — GLIMEPIRIDE 2 MG/1
2 TABLET ORAL
Qty: 90 TABLET | Refills: 0 | Status: SHIPPED | OUTPATIENT
Start: 2024-08-07

## 2024-10-18 ENCOUNTER — LAB VISIT (OUTPATIENT)
Dept: LAB | Facility: HOSPITAL | Age: 69
End: 2024-10-18
Attending: STUDENT IN AN ORGANIZED HEALTH CARE EDUCATION/TRAINING PROGRAM
Payer: MEDICARE

## 2024-10-18 ENCOUNTER — OFFICE VISIT (OUTPATIENT)
Dept: FAMILY MEDICINE | Facility: CLINIC | Age: 69
End: 2024-10-18
Payer: MEDICARE

## 2024-10-18 VITALS
BODY MASS INDEX: 27.36 KG/M2 | RESPIRATION RATE: 16 BRPM | SYSTOLIC BLOOD PRESSURE: 117 MMHG | HEART RATE: 95 BPM | DIASTOLIC BLOOD PRESSURE: 78 MMHG | WEIGHT: 164.19 LBS | HEIGHT: 65 IN | OXYGEN SATURATION: 78 %

## 2024-10-18 DIAGNOSIS — R05.9 COUGH, UNSPECIFIED TYPE: Primary | ICD-10-CM

## 2024-10-18 DIAGNOSIS — J45.40 MODERATE PERSISTENT ASTHMA WITHOUT COMPLICATION: ICD-10-CM

## 2024-10-18 DIAGNOSIS — E11.9 TYPE 2 DIABETES MELLITUS WITHOUT COMPLICATION, WITHOUT LONG-TERM CURRENT USE OF INSULIN: ICD-10-CM

## 2024-10-18 DIAGNOSIS — E78.2 MIXED HYPERLIPIDEMIA: ICD-10-CM

## 2024-10-18 DIAGNOSIS — J47.9 BRONCHIECTASIS WITHOUT COMPLICATION: ICD-10-CM

## 2024-10-18 DIAGNOSIS — Z12.11 SCREENING FOR COLON CANCER: ICD-10-CM

## 2024-10-18 DIAGNOSIS — R05.3 CHRONIC COUGH: ICD-10-CM

## 2024-10-18 LAB
ALBUMIN SERPL BCP-MCNC: 3.8 G/DL (ref 3.5–5.2)
ALBUMIN/CREAT UR: 6.4 UG/MG (ref 0–30)
ALP SERPL-CCNC: 86 U/L (ref 40–150)
ALT SERPL W/O P-5'-P-CCNC: 21 U/L (ref 10–44)
ANION GAP SERPL CALC-SCNC: 10 MMOL/L (ref 8–16)
AST SERPL-CCNC: 22 U/L (ref 10–40)
BILIRUB SERPL-MCNC: 0.8 MG/DL (ref 0.1–1)
BUN SERPL-MCNC: 11 MG/DL (ref 8–23)
CALCIUM SERPL-MCNC: 9.6 MG/DL (ref 8.7–10.5)
CHLORIDE SERPL-SCNC: 104 MMOL/L (ref 95–110)
CHOLEST SERPL-MCNC: 187 MG/DL (ref 120–199)
CHOLEST SERPL-MCNC: 187 MG/DL (ref 120–199)
CHOLEST/HDLC SERPL: 3.7 {RATIO} (ref 2–5)
CHOLEST/HDLC SERPL: 3.7 {RATIO} (ref 2–5)
CO2 SERPL-SCNC: 25 MMOL/L (ref 23–29)
CREAT SERPL-MCNC: 0.9 MG/DL (ref 0.5–1.4)
CREAT UR-MCNC: 220 MG/DL (ref 15–325)
CTP QC/QA: YES
CTP QC/QA: YES
ERYTHROCYTE [DISTWIDTH] IN BLOOD BY AUTOMATED COUNT: 13.6 % (ref 11.5–14.5)
EST. GFR  (NO RACE VARIABLE): >60 ML/MIN/1.73 M^2
ESTIMATED AVG GLUCOSE: 131 MG/DL (ref 68–131)
GLUCOSE SERPL-MCNC: 104 MG/DL (ref 70–110)
HBA1C MFR BLD: 6.2 % (ref 4–5.6)
HCT VFR BLD AUTO: 43.1 % (ref 37–48.5)
HDLC SERPL-MCNC: 51 MG/DL (ref 40–75)
HDLC SERPL-MCNC: 51 MG/DL (ref 40–75)
HDLC SERPL: 27.3 % (ref 20–50)
HDLC SERPL: 27.3 % (ref 20–50)
HGB BLD-MCNC: 13.7 G/DL (ref 12–16)
LDLC SERPL CALC-MCNC: 110.6 MG/DL (ref 63–159)
LDLC SERPL CALC-MCNC: 110.6 MG/DL (ref 63–159)
MCH RBC QN AUTO: 27.9 PG (ref 27–31)
MCHC RBC AUTO-ENTMCNC: 31.8 G/DL (ref 32–36)
MCV RBC AUTO: 88 FL (ref 82–98)
MICROALBUMIN UR DL<=1MG/L-MCNC: 14 UG/ML
NONHDLC SERPL-MCNC: 136 MG/DL
NONHDLC SERPL-MCNC: 136 MG/DL
PLATELET # BLD AUTO: 275 K/UL (ref 150–450)
PMV BLD AUTO: 9.3 FL (ref 9.2–12.9)
POC MOLECULAR INFLUENZA A AGN: NEGATIVE
POC MOLECULAR INFLUENZA B AGN: NEGATIVE
POTASSIUM SERPL-SCNC: 3.9 MMOL/L (ref 3.5–5.1)
PROT SERPL-MCNC: 7.5 G/DL (ref 6–8.4)
RBC # BLD AUTO: 4.91 M/UL (ref 4–5.4)
SARS-COV-2 RDRP RESP QL NAA+PROBE: NEGATIVE
SODIUM SERPL-SCNC: 139 MMOL/L (ref 136–145)
TRIGL SERPL-MCNC: 127 MG/DL (ref 30–150)
TRIGL SERPL-MCNC: 127 MG/DL (ref 30–150)
TSH SERPL DL<=0.005 MIU/L-ACNC: 0.91 UIU/ML (ref 0.4–4)
WBC # BLD AUTO: 7.05 K/UL (ref 3.9–12.7)

## 2024-10-18 PROCEDURE — 80061 LIPID PANEL: CPT | Performed by: STUDENT IN AN ORGANIZED HEALTH CARE EDUCATION/TRAINING PROGRAM

## 2024-10-18 PROCEDURE — 83036 HEMOGLOBIN GLYCOSYLATED A1C: CPT | Performed by: STUDENT IN AN ORGANIZED HEALTH CARE EDUCATION/TRAINING PROGRAM

## 2024-10-18 PROCEDURE — 85027 COMPLETE CBC AUTOMATED: CPT | Performed by: STUDENT IN AN ORGANIZED HEALTH CARE EDUCATION/TRAINING PROGRAM

## 2024-10-18 PROCEDURE — 80053 COMPREHEN METABOLIC PANEL: CPT | Performed by: STUDENT IN AN ORGANIZED HEALTH CARE EDUCATION/TRAINING PROGRAM

## 2024-10-18 PROCEDURE — 99999 PR PBB SHADOW E&M-EST. PATIENT-LVL III: CPT | Mod: PBBFAC,,, | Performed by: STUDENT IN AN ORGANIZED HEALTH CARE EDUCATION/TRAINING PROGRAM

## 2024-10-18 PROCEDURE — 82570 ASSAY OF URINE CREATININE: CPT | Performed by: STUDENT IN AN ORGANIZED HEALTH CARE EDUCATION/TRAINING PROGRAM

## 2024-10-18 PROCEDURE — 84443 ASSAY THYROID STIM HORMONE: CPT | Performed by: STUDENT IN AN ORGANIZED HEALTH CARE EDUCATION/TRAINING PROGRAM

## 2024-10-18 PROCEDURE — 82043 UR ALBUMIN QUANTITATIVE: CPT | Performed by: STUDENT IN AN ORGANIZED HEALTH CARE EDUCATION/TRAINING PROGRAM

## 2024-10-18 PROCEDURE — 36415 COLL VENOUS BLD VENIPUNCTURE: CPT | Performed by: STUDENT IN AN ORGANIZED HEALTH CARE EDUCATION/TRAINING PROGRAM

## 2024-10-18 RX ORDER — AZITHROMYCIN 250 MG/1
TABLET, FILM COATED ORAL
Qty: 6 TABLET | Refills: 0 | Status: SHIPPED | OUTPATIENT
Start: 2024-10-18 | End: 2024-10-23

## 2024-10-18 RX ORDER — PROMETHAZINE HYDROCHLORIDE AND DEXTROMETHORPHAN HYDROBROMIDE 6.25; 15 MG/5ML; MG/5ML
SYRUP ORAL
Qty: 240 ML | Refills: 1 | Status: SHIPPED | OUTPATIENT
Start: 2024-10-18

## 2024-10-18 NOTE — ASSESSMENT & PLAN NOTE
Seeing pulm  No wheezes but at risk for exacerbation  Does not usually take steroid due to hearing loss

## 2024-10-18 NOTE — PROGRESS NOTES
Subjective:       Patient ID: Keesha Quezada is a 69 y.o. female.    Chief Complaint: Annual Exam    Illness-  Has asthma  She has chronic cough as well  Worsening symptoms [place]  5 days of symptoms  Sore throat is better  Cough with some clear mucus.  Nasal congestion/runny nose  Ears are fine.  She did have fever to 101.8 but this resolved (Tuesday night).  She took a home covid on day 2 of symptoms and was negative  She had a party with 6 couples and had get together and a meeting        .  Patient Active Problem List   Diagnosis    Medial collateral ligament sprain of knee    Pes anserinus bursitis of both knees    Patellofemoral pain syndrome of both knees    Acute medial meniscus tear, left, initial encounter    Baker cyst, right    Acute medial meniscus tear, left, subsequent encounter    Chronic cough    Moderate persistent asthma without complication    Osteoarthritis of multiple joints    Hypertension    Hyperlipidemia    Type 2 diabetes mellitus without complication, without long-term current use of insulin    Osteopenia of multiple sites    Calcaneal spur of right foot    Bronchiectasis without complication    Achilles bursitis of right lower extremity     Keesha has a current medication list which includes the following prescription(s): amlodipine, atorvastatin, chlorhexidine, diclofenac sodium, fluticasone-salmeterol 250-50 mcg/dose, glimepiride, losartan, azithromycin, and promethazine-dextromethorphan.    Review of Systems   Constitutional:  Positive for chills and fever. Negative for activity change and appetite change.   HENT:  Positive for nasal congestion, postnasal drip, rhinorrhea and sinus pressure/congestion. Negative for sore throat.    Respiratory:  Negative for shortness of breath.    Cardiovascular:  Negative for chest pain.   Gastrointestinal:  Negative for abdominal pain.   Genitourinary:  Negative for dysuria.   Integumentary:  Negative for rash.   Psychiatric/Behavioral:   Negative for depressed mood and sleep disturbance. The patient is not nervous/anxious.          Health Maintenance Due   Topic Date Due    Hepatitis C Screening  Never done    Pneumococcal Vaccines (Age 65+) (1 of 2 - PCV) Never done    Mammogram  Never done    Shingles Vaccine (1 of 2) Never done    RSV Vaccine (Age 60+ and Pregnant patients) (1 - Risk 60-74 years 1-dose series) Never done    Hemoglobin A1c  04/19/2024    Influenza Vaccine (1) Never done    COVID-19 Vaccine (3 - 2024-25 season) 09/01/2024    Diabetes Urine Screening  10/19/2024    Lipid Panel  10/19/2024    Colorectal Cancer Screening  10/21/2024    Eye Exam  11/09/2024      Health Maintenance reviewed and discussed- encouraged her to schedule eye exam.     Objective:      Physical Exam  Constitutional:       General: She is not in acute distress.     Appearance: Normal appearance. She is not ill-appearing.   Eyes:      Conjunctiva/sclera: Conjunctivae normal.   Cardiovascular:      Rate and Rhythm: Normal rate and regular rhythm.      Heart sounds: Normal heart sounds. No murmur heard.  Pulmonary:      Effort: Pulmonary effort is normal. No respiratory distress.      Breath sounds: Normal breath sounds. No wheezing or rales.   Musculoskeletal:      Right lower leg: No edema.      Left lower leg: No edema.   Skin:     General: Skin is warm and dry.   Neurological:      Mental Status: She is alert. Mental status is at baseline.      Gait: Gait normal.   Psychiatric:         Mood and Affect: Mood normal.         Behavior: Behavior normal.         Thought Content: Thought content normal.         Judgment: Judgment normal.         Assessment:       1. Cough, unspecified type    2. Chronic cough    3. Bronchiectasis without complication    4. Moderate persistent asthma without complication    5. Screening for colon cancer        Plan:       1. Cough, unspecified type  -     POCT Influenza A/B Molecular  -     POCT COVID-19 Rapid Screening  -      azithromycin (Z-KEILA) 250 MG tablet; Take 2 tablets by mouth on day 1; Take 1 tablet by mouth on days 2-5  Dispense: 6 tablet; Refill: 0    2. Chronic cough  -     promethazine-dextromethorphan (PROMETHAZINE-DM) 6.25-15 mg/5 mL Syrp; TAKE 5 MLS BY MOUTH EVERY 6 HOURS AS NEEDED FOR COUGH. Strength: 6.25-15 mg/5 mL  Dispense: 240 mL; Refill: 1    3. Bronchiectasis without complication  Assessment & Plan:  Did well with azithromycin before and will repeat if worsening- pocket script given    Orders:  -     azithromycin (Z-KEILA) 250 MG tablet; Take 2 tablets by mouth on day 1; Take 1 tablet by mouth on days 2-5  Dispense: 6 tablet; Refill: 0  -     X-Ray Chest PA And Lateral; Future; Expected date: 10/18/2024    4. Moderate persistent asthma without complication  Assessment & Plan:  Seeing pulm  No wheezes but at risk for exacerbation  Does not usually take steroid due to hearing loss      Orders:  -     azithromycin (Z-KEILA) 250 MG tablet; Take 2 tablets by mouth on day 1; Take 1 tablet by mouth on days 2-5  Dispense: 6 tablet; Refill: 0    5. Screening for colon cancer  -     Cologuard Screening (Multitarget Stool DNA); Future; Expected date: 10/18/2024

## 2024-10-20 DIAGNOSIS — E11.9 TYPE 2 DIABETES MELLITUS WITHOUT COMPLICATION, WITHOUT LONG-TERM CURRENT USE OF INSULIN: ICD-10-CM

## 2024-10-20 DIAGNOSIS — E78.2 MIXED HYPERLIPIDEMIA: ICD-10-CM

## 2024-10-20 NOTE — TELEPHONE ENCOUNTER
No care due was identified.  [facility] Embedded Care Due Messages. Reference number: [id number].   10/20/2024 6:11:47 AM CDT

## 2024-10-21 ENCOUNTER — HOSPITAL ENCOUNTER (OUTPATIENT)
Dept: RADIOLOGY | Facility: HOSPITAL | Age: 69
Discharge: HOME | End: 2024-10-21
Attending: STUDENT IN AN ORGANIZED HEALTH CARE EDUCATION/TRAINING PROGRAM
Payer: MEDICARE

## 2024-10-21 DIAGNOSIS — J47.9 BRONCHIECTASIS WITHOUT COMPLICATION: ICD-10-CM

## 2024-10-21 PROCEDURE — 71046 X-RAY EXAM CHEST 2 VIEWS: CPT | Mod: 26,,, | Performed by: RADIOLOGY

## 2024-10-21 PROCEDURE — 71046 X-RAY EXAM CHEST 2 VIEWS: CPT | Mod: TC,PN

## 2024-10-21 RX ORDER — GLIMEPIRIDE 2 MG/1
2 TABLET ORAL
Qty: 90 TABLET | Refills: 1 | Status: SHIPPED | OUTPATIENT
Start: 2024-10-21

## 2024-10-21 RX ORDER — ATORVASTATIN CALCIUM 40 MG/1
40 TABLET, FILM COATED ORAL
Qty: 90 TABLET | Refills: 3 | Status: SHIPPED | OUTPATIENT
Start: 2024-10-21

## 2024-10-21 NOTE — TELEPHONE ENCOUNTER
Refill Decision Note   Keesha Quezada  is requesting a refill authorization.  Brief Assessment and Rationale for Refill:  Approve     Medication Therapy Plan:        Comments:     Note composed:3:11 AM 10/21/2024

## 2024-10-23 ENCOUNTER — PATIENT MESSAGE (OUTPATIENT)
Dept: FAMILY MEDICINE | Facility: CLINIC | Age: 69
End: 2024-10-23
Payer: MEDICARE

## 2024-10-28 ENCOUNTER — TELEPHONE (OUTPATIENT)
Dept: FAMILY MEDICINE | Facility: CLINIC | Age: 69
End: 2024-10-28
Payer: MEDICARE

## 2024-10-28 DIAGNOSIS — M25.561 CHRONIC PAIN OF RIGHT KNEE: Primary | ICD-10-CM

## 2024-10-28 DIAGNOSIS — G89.29 CHRONIC PAIN OF RIGHT KNEE: Primary | ICD-10-CM

## 2024-11-06 DIAGNOSIS — I10 PRIMARY HYPERTENSION: ICD-10-CM

## 2024-11-06 RX ORDER — LOSARTAN POTASSIUM 50 MG/1
50 TABLET ORAL
Qty: 90 TABLET | Refills: 3 | Status: SHIPPED | OUTPATIENT
Start: 2024-11-06

## 2024-11-06 RX ORDER — AMLODIPINE BESYLATE 5 MG/1
5 TABLET ORAL
Qty: 90 TABLET | Refills: 3 | Status: SHIPPED | OUTPATIENT
Start: 2024-11-06

## 2024-11-06 NOTE — TELEPHONE ENCOUNTER
No care due was identified.  Health [facility] Embedded Care Due Messages. Reference number: [id number].   11/06/2024 3:13:40 AM CST

## 2024-11-06 NOTE — TELEPHONE ENCOUNTER
Refill Decision Note   Keesha Quezada  is requesting a refill authorization.  Brief Assessment and Rationale for Refill:  Approve     Medication Therapy Plan:         Comments:     Note composed:2:03 PM 11/06/2024

## 2024-11-27 ENCOUNTER — PATIENT MESSAGE (OUTPATIENT)
Dept: FAMILY MEDICINE | Facility: CLINIC | Age: 69
End: 2024-11-27
Payer: MEDICARE

## 2024-12-10 ENCOUNTER — OFFICE VISIT (OUTPATIENT)
Dept: URGENT CARE | Facility: CLINIC | Age: 69
End: 2024-12-10
Payer: MEDICARE

## 2024-12-10 VITALS
DIASTOLIC BLOOD PRESSURE: 71 MMHG | BODY MASS INDEX: 26.82 KG/M2 | HEART RATE: 93 BPM | HEIGHT: 65 IN | OXYGEN SATURATION: 95 % | WEIGHT: 161 LBS | TEMPERATURE: 98 F | SYSTOLIC BLOOD PRESSURE: 106 MMHG | RESPIRATION RATE: 19 BRPM

## 2024-12-10 DIAGNOSIS — B96.89 ACUTE BACTERIAL SINUSITIS: Primary | ICD-10-CM

## 2024-12-10 DIAGNOSIS — R05.1 ACUTE COUGH: ICD-10-CM

## 2024-12-10 DIAGNOSIS — J01.90 ACUTE BACTERIAL SINUSITIS: Primary | ICD-10-CM

## 2024-12-10 PROCEDURE — 99213 OFFICE O/P EST LOW 20 MIN: CPT | Mod: S$GLB,,, | Performed by: NURSE PRACTITIONER

## 2024-12-10 RX ORDER — ALBUTEROL SULFATE 90 UG/1
2 INHALANT RESPIRATORY (INHALATION) EVERY 6 HOURS PRN
COMMUNITY

## 2024-12-10 RX ORDER — BENZONATATE 200 MG/1
200 CAPSULE ORAL 3 TIMES DAILY PRN
Qty: 21 CAPSULE | Refills: 0 | Status: SHIPPED | OUTPATIENT
Start: 2024-12-10 | End: 2024-12-20

## 2024-12-10 RX ORDER — AMOXICILLIN 875 MG/1
875 TABLET, FILM COATED ORAL EVERY 12 HOURS
Qty: 20 TABLET | Refills: 0 | Status: SHIPPED | OUTPATIENT
Start: 2024-12-10 | End: 2024-12-20

## 2024-12-10 RX ORDER — MELOXICAM 15 MG
15 TABLET ORAL
COMMUNITY
Start: 2024-11-12 | End: 2025-02-10

## 2024-12-10 NOTE — PROGRESS NOTES
"Subjective:       Patient ID: Keesha Quezada is a 69 y.o. female.    Vitals:  height is 5' 5" (1.651 m) and weight is 73 kg (161 lb). Her oral temperature is 98.1 °F (36.7 °C). Her blood pressure is 106/71 and her pulse is 93. Her respiration is 19 and oxygen saturation is 95%.     Chief Complaint: Cough    This is a 69 y.o. female who presents today with a chief complaint of chest congestion with persistent cough nasal congestion headache fatigue and overall not feeling well that has acutely worsened over the past 3-4 days.  Patient reports that coughing episodes leading to mild tightness to chest and pain in bilateral ribs associated with coughing.  Taking mucinex and promethazine DM cough syrup with only mild relief. Declines testing.      Patient presents with:  Cough         Cough  This is a new problem. The current episode started in the past 7 days. The problem has been gradually worsening. The cough is Non-productive. Associated symptoms include headaches and nasal congestion. Pertinent negatives include no shortness of breath. Treatments tried: mucinex and promethazine DM. The treatment provided moderate relief. Her past medical history is significant for bronchiectasis and bronchitis.       Constitution: Negative.   HENT:  Positive for congestion, sinus pain and sinus pressure.    Respiratory:  Positive for cough. Negative for shortness of breath.    Neurological:  Positive for headaches.           Objective:      Physical Exam   Constitutional: She is oriented to person, place, and time. She appears well-developed. She is cooperative.  Non-toxic appearance. She does not appear ill. No distress.   HENT:   Head: Normocephalic and atraumatic.   Ears:   Right Ear: Hearing, tympanic membrane, external ear and ear canal normal.   Left Ear: Hearing, tympanic membrane, external ear and ear canal normal.   Nose: Nose normal. No mucosal edema, rhinorrhea or nasal deformity. No epistaxis. Right sinus exhibits no " maxillary sinus tenderness and no frontal sinus tenderness. Left sinus exhibits no maxillary sinus tenderness and no frontal sinus tenderness.   Mouth/Throat: Uvula is midline, oropharynx is clear and moist and mucous membranes are normal. No trismus in the jaw. Normal dentition. No uvula swelling. No oropharyngeal exudate, posterior oropharyngeal edema or posterior oropharyngeal erythema.   Eyes: Conjunctivae and lids are normal. No scleral icterus.   Neck: Trachea normal and phonation normal. Neck supple. No edema present. No erythema present. No neck rigidity present.   Cardiovascular: Normal rate, regular rhythm, normal heart sounds and normal pulses.   Pulmonary/Chest: Effort normal. No respiratory distress. She has no decreased breath sounds. She has no wheezes. She has rhonchi (mild upper). She has no rales.   Abdominal: Normal appearance.   Musculoskeletal: Normal range of motion.         General: No deformity. Normal range of motion.   Neurological: She is alert and oriented to person, place, and time. She exhibits normal muscle tone. Coordination normal.   Skin: Skin is warm, dry, intact, not diaphoretic and not pale.   Psychiatric: Her speech is normal and behavior is normal. Judgment and thought content normal.   Nursing note and vitals reviewed.        Past medical history and current medications reviewed.     XR CHEST PA AND LATERAL  Narrative: EXAMINATION:  XR CHEST PA AND LATERAL    CLINICAL HISTORY:  Acute cough    TECHNIQUE:  PA and lateral views of the chest were performed.    COMPARISON:  10/21/2024.    FINDINGS:  Dependent atelectasis at the left lung base.  Right lung is clear.  Heart size top normal.  Trachea midline.  Bony thorax intact.  Impression: Dependent left lower lobe atelectasis.    Electronically signed by: Bert Adame  Date:    12/10/2024  Time:    12:41     Assessment:           1. Acute bacterial sinusitis    2. Acute cough              Plan:         Acute bacterial  sinusitis  -     amoxicillin (AMOXIL) 875 MG tablet; Take 1 tablet (875 mg total) by mouth every 12 (twelve) hours. for 10 days  Dispense: 20 tablet; Refill: 0    Acute cough  -     XR CHEST PA AND LATERAL; Future; Expected date: 12/10/2024  -     benzonatate (TESSALON) 200 MG capsule; Take 1 capsule (200 mg total) by mouth 3 (three) times daily as needed for Cough.  Dispense: 21 capsule; Refill: 0             Patient Instructions   Please return here or go to the Emergency Department for any concerns or worsening of condition.  Please drink plenty of fluids.  Please get plenty of rest.  If you were prescribed antibiotics, please take them to completion.  If you do not have Hypertension or any history of palpitations, it is ok to take over the counter Sudafed or Mucinex D or Allegra-D or Claritin-D or Zyrtec-D.  If you do take one of the above, it is ok to combine that with plain over the counter Mucinex or Allegra or Claritin or Zyrtec.  If for example you are taking Zyrtec -D, you can combine that with Mucinex, but not Mucinex-D.  If you are taking Mucinex-D, you can combine that with plain Allegra or Claritin or Zyrtec.   If you do have Hypertension or palpitations, it is safe to take Coricidin HBP for relief of sinus symptoms.  If not allergic, please take over the counter Tylenol (Acetaminophen) and/or Motrin (Ibuprofen) as directed for control of pain and/or fever.  Please follow up with your primary care doctor or specialist as needed.    If you  smoke, please stop smoking.           PEDRO HarrisonC

## 2024-12-11 DIAGNOSIS — R05.3 CHRONIC COUGH: ICD-10-CM

## 2024-12-11 NOTE — TELEPHONE ENCOUNTER
Refill Routing Note   Medication(s) are not appropriate for processing by [facility] for the following reason(s):        Outside of protocol    [facility] action(s):  Route             Appointments  past 12m or future 3m with PCP    Date Provider   Last Visit   10/18/2024 Radha Marquez MD   Next Visit   1/17/2025 Radha Marquez MD   ED visits in past 90 days: 0        Note composed:1:49 PM 12/11/2024

## 2024-12-13 RX ORDER — PROMETHAZINE HYDROCHLORIDE AND DEXTROMETHORPHAN HYDROBROMIDE 6.25; 15 MG/5ML; MG/5ML
SYRUP ORAL
Qty: 240 ML | Refills: 1 | Status: SHIPPED | OUTPATIENT
Start: 2024-12-13

## 2025-02-10 ENCOUNTER — PATIENT MESSAGE (OUTPATIENT)
Dept: FAMILY MEDICINE | Facility: CLINIC | Age: 70
End: 2025-02-10

## 2025-02-26 ENCOUNTER — PATIENT MESSAGE (OUTPATIENT)
Dept: FAMILY MEDICINE | Facility: CLINIC | Age: 70
End: 2025-02-26

## 2025-03-11 NOTE — PROGRESS NOTES
"Subjective:      Patient ID: Keesha Quezada is a 64 y.o. female.    Chief Complaint: Follow-up (Left knee sx 12/3/19)    "HPI: Ms. Luis returns today for approximately 6 week follow-up on arthroscopy of her left knee. At her last visit on 12/13/2019 she was forwarded to physical therapy.  She stated that her knee feels a little "wobbly".  She has not been doing any home exercises.  She has been wearing her brace.  Her date of surgery 12/03/2019.    ROS:  No new diagnosis/surgery/prescriptions since last office visit on 12/13/2019.      Objective:      Physical Exam:   General: AAOx3.  No acute distress  Vascular:  Pulses intact and equal bilaterally.  Capillary refill less than 3 seconds and equal bilaterally  Neurologic:  Pinprick and soft touch intact and equal bilaterally  Integment:  No ecchymosis, no errythema.  Incisions well healed  Extremity:  Knee:  Extension/flexion equal bilaterally 0/128 degrees. No effusion either knee. Mild crepitus with motion both knees.  Negative patellar load/compression bilaterally. Negative patella apprehension/relocation bilaterally. Varus/valgus stressing equal bilaterally with endpoint.  Radiography:  No new x-rays done today.      Assessment:       Impression:  Arthroscopy left knee.      Plan:       1.  Discussed physical examination with the patient. Keesha understands that she should be progressing better discussed in detail with the patient that she has to do exercises on her own as well as do physical therapy in order to progress the way she should.  Discussed with the patient that recommend she joined a gym and attend the gym daily to do quadriceps and hamstring strengthening and other exercises that can be shown to her by her physical therapist.  Discussed in detail with the patient that she needs to be an active participant in the rehab program and not just rely on 2-3 days a week of physical therapy to improve.  2.  Continue with physical therapy for 1 more month then " discharged HEP.  Physical therapy should start aggressive closed chain quadriceps and hamstring strengthening exercises.  3.  Continue with brace wear.  4.  Any pain can be treated with over-the-counter medications dosed per box instructions.  5.  Patient is workman's comp form was completed placing her at light duty.  6.  [facility] portal was discussed with the patient and information was given.  The patient was encouraged to use the portal for future encounters.  7.  Follow up in 6 weeks.  Expect to refer the patient for an FCE/disability rating as she should be at maximal medical improvement at next visit.           No

## 2025-05-09 ENCOUNTER — TELEPHONE (OUTPATIENT)
Dept: FAMILY MEDICINE | Facility: CLINIC | Age: 70
End: 2025-05-09

## 2025-05-09 NOTE — TELEPHONE ENCOUNTER
----- Message from Lory sent at 5/9/2025 11:20 AM CDT -----  Contact: insurance  Type:  RX Refill RequestWho Called:   insuranceRefill or New Rx:   refillRX Name and Strength:  glimepiride (AMARYL) 2 MG tablet How is the patient currently taking it? (ex. 1XDay):  as orderedIs this a 30 day or 90 day RX:  90Preferred Pharmacy with phone number:  [facility] - [place], [place] - [address] [place] [address]Lxhpf: [phone] Fax: [phone] or Mail Order:  localOrdering Provider:   Charmaine Call Back Number:  [phone] ptAdditional Information:

## 2025-06-25 LAB
LEFT EYE DM RETINOPATHY: POSITIVE
RIGHT EYE DM RETINOPATHY: POSITIVE

## 2025-06-26 ENCOUNTER — TELEPHONE (OUTPATIENT)
Dept: FAMILY MEDICINE | Facility: CLINIC | Age: 70
End: 2025-06-26

## 2025-06-26 DIAGNOSIS — E11.9 TYPE 2 DIABETES MELLITUS WITHOUT COMPLICATION, WITHOUT LONG-TERM CURRENT USE OF INSULIN: ICD-10-CM

## 2025-06-26 DIAGNOSIS — R05.3 CHRONIC COUGH: ICD-10-CM

## 2025-06-26 NOTE — TELEPHONE ENCOUNTER
Copied from CRM #[id number]. Topic: General Inquiry - Patient Advice  >> Jun 26, 2025  3:22 PM Nanette wrote:  Type:  Needs Medical Advice    Who Called: pt  Pharmacy name and phone #:    [facility] - [place], [place] - [address]  [address] [place] [address]  Phone: [phone] Fax: [phone]  Would the patient rather a call back or a response via [facility]? Portal   Best Call Back Number: [phone]   Additional Information: needs her cough medicine    Please advise    Thanks

## 2025-06-26 NOTE — TELEPHONE ENCOUNTER
Copied from CRM #[id number]. Topic: Medications - Medication Refill  >> Jun 26, 2025  9:36 AM Eliu wrote:  Type:  RX Refill Request    Who Called: Reg , [facility]     Refill or New Rx:refill     RX Name and Strength:  1)glimepiride (AMARYL) 2 MG tablet  2)promethazine-dextromethorphan (PROMETHAZINE-DM) 6.25-15 mg/5 mL Syrp    How is the patient currently taking it? (ex. 1XDay):as directed     Is this a 30 day or 90 day RX:100    Preferred Pharmacy with phone number:  [facility] - [place], [place] - [address]  [address] [place] [address]  Phone: [phone] Fax: [phone]    Local or Mail Order:local     Ordering Provider:Alma     Would the patient rather a call back or a response via [facility]? Call back     Best Call Back Number:[phone]     Additional Information: please contact and advise, thank you

## 2025-06-27 DIAGNOSIS — E11.9 TYPE 2 DIABETES MELLITUS WITHOUT COMPLICATION, WITHOUT LONG-TERM CURRENT USE OF INSULIN: ICD-10-CM

## 2025-06-27 DIAGNOSIS — R05.3 CHRONIC COUGH: ICD-10-CM

## 2025-06-27 RX ORDER — GLIMEPIRIDE 2 MG/1
2 TABLET ORAL
Qty: 90 TABLET | Refills: 0 | Status: SHIPPED | OUTPATIENT
Start: 2025-06-27

## 2025-06-27 RX ORDER — PROMETHAZINE HYDROCHLORIDE AND DEXTROMETHORPHAN HYDROBROMIDE 6.25; 15 MG/5ML; MG/5ML
SYRUP ORAL
Qty: 240 ML | Refills: 0 | Status: SHIPPED | OUTPATIENT
Start: 2025-06-27 | End: 2025-06-27 | Stop reason: SDUPTHER

## 2025-06-27 RX ORDER — PROMETHAZINE HYDROCHLORIDE AND DEXTROMETHORPHAN HYDROBROMIDE 6.25; 15 MG/5ML; MG/5ML
SYRUP ORAL
Qty: 240 ML | Refills: 0 | Status: SHIPPED | OUTPATIENT
Start: 2025-06-27

## 2025-06-27 NOTE — TELEPHONE ENCOUNTER
Care Due:                  Date            Visit Type   Department     Provider  --------------------------------------------------------------------------------                                EP -                              PRIMARY      [facility]  Last Visit: 10-      CARE ([facility])   MEDICINE       Radha Marquez                              EP -                              PRIMARY      [facility]  Next Visit: 08-      CARE ([facility])   MEDICINE       Radha Marquez                                                            Last  Test          Frequency    Reason                     Performed    Due Date  --------------------------------------------------------------------------------    HBA1C.......  6 months...  glimepiride..............  10-   04-    Health [facility] Embedded Care Due Messages. Reference number: [id number].   6/27/2025 8:54:52 AM CDT

## 2025-06-27 NOTE — TELEPHONE ENCOUNTER
Copied from CRM #[id number]. Topic: Medications - Medication Refill  >> Jun 27, 2025  8:50 AM Chanell wrote:  Type:  RX Refill Request    Who Called:  [facility]/ [facility]   Refill or New Rx:  refill   RX Name and Strength:  glimepiride (AMARYL) 2 MG tablet// promethazine-dextromethorphan (PROMETHAZINE-DM) 6.25-15 mg/5 mL Syrp    How is the patient currently taking it? (ex. 1XDay):  -  Is this a 30 day or 90 day RX:  90  Preferred Pharmacy with phone number:        [facility] - [place], [place] - [address]  [address] [place] [address]  Phone: [phone] Fax: [phone]      Local or Mail Order:  Local   Ordering Provider:  Alma Emery Call Back Number:  [phone]    Additional Information:  Pls call pt back and advise

## 2025-07-07 ENCOUNTER — PATIENT MESSAGE (OUTPATIENT)
Dept: ADMINISTRATIVE | Facility: HOSPITAL | Age: 70
End: 2025-07-07

## 2025-07-22 ENCOUNTER — TELEPHONE (OUTPATIENT)
Dept: FAMILY MEDICINE | Facility: CLINIC | Age: 70
End: 2025-07-22

## 2025-07-23 ENCOUNTER — PATIENT MESSAGE (OUTPATIENT)
Dept: FAMILY MEDICINE | Facility: CLINIC | Age: 70
End: 2025-07-23
Payer: COMMERCIAL

## 2025-07-30 DIAGNOSIS — E11.9 TYPE 2 DIABETES MELLITUS WITHOUT COMPLICATION: ICD-10-CM

## 2025-08-05 ENCOUNTER — OFFICE VISIT (OUTPATIENT)
Dept: FAMILY MEDICINE | Facility: CLINIC | Age: 70
End: 2025-08-05
Payer: COMMERCIAL

## 2025-08-05 VITALS
HEART RATE: 83 BPM | WEIGHT: 167.31 LBS | HEIGHT: 65 IN | SYSTOLIC BLOOD PRESSURE: 126 MMHG | OXYGEN SATURATION: 97 % | RESPIRATION RATE: 16 BRPM | BODY MASS INDEX: 27.88 KG/M2 | DIASTOLIC BLOOD PRESSURE: 76 MMHG

## 2025-08-05 DIAGNOSIS — M79.641 PAIN IN BOTH HANDS: ICD-10-CM

## 2025-08-05 DIAGNOSIS — Z11.59 ENCOUNTER FOR HEPATITIS C SCREENING TEST FOR LOW RISK PATIENT: ICD-10-CM

## 2025-08-05 DIAGNOSIS — Z12.31 SCREENING MAMMOGRAM FOR BREAST CANCER: ICD-10-CM

## 2025-08-05 DIAGNOSIS — E11.9 TYPE 2 DIABETES MELLITUS WITHOUT COMPLICATION, WITHOUT LONG-TERM CURRENT USE OF INSULIN: ICD-10-CM

## 2025-08-05 DIAGNOSIS — R91.1 LUNG NODULE: ICD-10-CM

## 2025-08-05 DIAGNOSIS — M79.642 PAIN IN BOTH HANDS: ICD-10-CM

## 2025-08-05 DIAGNOSIS — I10 PRIMARY HYPERTENSION: ICD-10-CM

## 2025-08-05 DIAGNOSIS — E78.2 MIXED HYPERLIPIDEMIA: ICD-10-CM

## 2025-08-05 DIAGNOSIS — R20.2 PARESTHESIA: Primary | ICD-10-CM

## 2025-08-05 DIAGNOSIS — M85.89 OSTEOPENIA OF MULTIPLE SITES: ICD-10-CM

## 2025-08-05 DIAGNOSIS — R05.3 CHRONIC COUGH: ICD-10-CM

## 2025-08-05 DIAGNOSIS — J47.9 BRONCHIECTASIS WITHOUT COMPLICATION: ICD-10-CM

## 2025-08-05 PROCEDURE — 99999 PR PBB SHADOW E&M-EST. PATIENT-LVL IV: CPT | Mod: PBBFAC,,, | Performed by: STUDENT IN AN ORGANIZED HEALTH CARE EDUCATION/TRAINING PROGRAM

## 2025-08-05 PROCEDURE — 99214 OFFICE O/P EST MOD 30 MIN: CPT | Mod: S$GLB,,, | Performed by: STUDENT IN AN ORGANIZED HEALTH CARE EDUCATION/TRAINING PROGRAM

## 2025-08-05 RX ORDER — GLIMEPIRIDE 2 MG/1
2 TABLET ORAL
Qty: 90 TABLET | Refills: 3 | Status: SHIPPED | OUTPATIENT
Start: 2025-08-05

## 2025-08-05 RX ORDER — PROMETHAZINE HYDROCHLORIDE AND DEXTROMETHORPHAN HYDROBROMIDE 6.25; 15 MG/5ML; MG/5ML
SYRUP ORAL
Qty: 240 ML | Refills: 0 | Status: SHIPPED | OUTPATIENT
Start: 2025-08-05

## 2025-08-05 RX ORDER — LOSARTAN POTASSIUM 50 MG/1
50 TABLET ORAL DAILY
Qty: 90 TABLET | Refills: 3 | Status: SHIPPED | OUTPATIENT
Start: 2025-08-05

## 2025-08-05 RX ORDER — AMLODIPINE BESYLATE 5 MG/1
5 TABLET ORAL DAILY
Qty: 90 TABLET | Refills: 3 | Status: SHIPPED | OUTPATIENT
Start: 2025-08-05

## 2025-08-05 RX ORDER — ALBUTEROL SULFATE 90 UG/1
2 INHALANT RESPIRATORY (INHALATION) EVERY 6 HOURS PRN
Qty: 18 G | Refills: 1 | Status: SHIPPED | OUTPATIENT
Start: 2025-08-05

## 2025-08-05 RX ORDER — ATORVASTATIN CALCIUM 40 MG/1
40 TABLET, FILM COATED ORAL DAILY
Qty: 90 TABLET | Refills: 3 | Status: SHIPPED | OUTPATIENT
Start: 2025-08-05

## 2025-08-05 RX ORDER — HYDROCHLOROTHIAZIDE 12.5 MG/1
12.5 CAPSULE ORAL DAILY
Qty: 30 CAPSULE | Refills: 11 | Status: SHIPPED | OUTPATIENT
Start: 2025-08-05 | End: 2026-08-05

## 2025-08-05 NOTE — ASSESSMENT & PLAN NOTE
Continued cough.  Has promethazine cough syrup when it is particularly bad  
Lab Results   Component Value Date    HGBA1C 6.2 (H) 10/18/2024     Diabetes Medications              glimepiride (AMARYL) 2 MG tablet Take 1 tablet (2 mg total) by mouth before breakfast.          Stable. Continue current medications and regular followup.  Repeat A1C  
Repeat and follow  
Stable. Continue current medications and regular followup.  Hyperlipidemia Medications               atorvastatin (LIPITOR) 40 MG tablet TAKE 1 TABLET EVERY DAY            
Stable. Continue current medications and regular followup.  Hypertension Medications               amLODIPine (NORVASC) 5 MG tablet TAKE 1 TABLET EVERY DAY    losartan (COZAAR) 50 MG tablet TAKE 1 TABLET EVERY DAY        Has some edema  Consider stopping the norvasc.  Consider adding hctz for fluid    
Unchanged and continued  Has been to pulmonary.  She has not been to ENT  Ct chest repeat  
TRANSFER

## 2025-08-05 NOTE — PROGRESS NOTES
Subjective:       Patient ID: Keesha Quezada is a 70 y.o. female.    Chief Complaint: Annual Exam    History of Present Illness    CHIEF COMPLAINT:  Ms. Quezada presents today for hand tingling and pain.    CARPAL TUNNEL SYMPTOMS:  She reports right hand tingling affecting three fingers (excluding the pinky) occurring nightly between 2-4 AM during sleep. Symptoms have been present for several months, with recent exacerbation after extensive gardening (repotting eleven pots and using hedge trimmers for approximately eight hours). Symptoms typically resolve with movement such as walking or stepping up and down for approximately 10 minutes. She reports relief when getting up to use the bathroom, with episodes recurring 5-6 times in the past few months. She denies tingling or pain in the pinky finger and reports symptoms are exclusively nocturnal while sleeping.    RESPIRATORY:  She has chronic bronchiectasis with persistent daily cough that interferes with sleep. She manages symptoms with cough syrup at night only, reporting daytime light-headedness with use. She notes partial airway blockage. Lung nodules were initially discovered in October 2023.    PERIPHERAL EDEMA:  She reports bilateral ankle swelling for a few weeks, more pronounced with heat and prolonged standing. Recent exacerbation occurred after increased activity at a concert and consuming hotel food. 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 She is not currently taking diuretics as she had previously. She acknowledges that amlodipine may contribute to swelling.    OPHTHALMOLOGIC:  She has macular degeneration managed by retina specialist Dr. Titus Meredith every 90 days. She reports no fluid accumulation in her eyes for the past three years with vision improvement during this time. She takes resveratrol supplement as recommended by her specialist. She has a family history of macular  degeneration in her father. She is due for annual contact lens exam in the upcoming weeks.       Review of Systems   Constitutional:  Negative for activity change and appetite change.   Respiratory:  Negative for shortness of breath.    Cardiovascular:  Positive for leg swelling. Negative for chest pain.   Gastrointestinal:  Negative for abdominal pain.   Genitourinary:  Negative for dysuria.   Integumentary:  Negative for rash.   Psychiatric/Behavioral:  Negative for sleep disturbance.       Problem List[1]  Keesha has a current medication list which includes the following prescription(s): diclofenac sodium, albuterol, amlodipine, atorvastatin, glimepiride, hydrochlorothiazide, losartan, and promethazine-dextromethorphan.        Health Maintenance Due   Topic Date Due    Hepatitis C Screening  Never done    Mammogram  Never done    Pneumococcal Vaccines (Age 50+) (1 of 2 - PCV) Never done    Shingles Vaccine (1 of 2) Never done    RSV Vaccine (Age 60+ and Pregnant patients) (1 - Risk 60-74 years 1-dose series) Never done    COVID-19 Vaccine (3 - 2024-25 season) 09/01/2024    Diabetic Eye Exam  11/09/2024    Foot Exam  03/26/2025    Hemoglobin A1c  04/18/2025    DEXA Scan  10/20/2025      Health Maintenance reviewed and discussed- encourage foot and eye exam.     Objective:      Physical Exam  Constitutional:       General: She is not in acute distress.     Appearance: Normal appearance. She is not ill-appearing.   Eyes:      Conjunctiva/sclera: Conjunctivae normal.   Cardiovascular:      Rate and Rhythm: Normal rate and regular rhythm.      Heart sounds: Normal heart sounds. No murmur heard.  Pulmonary:      Effort: Pulmonary effort is normal. No respiratory distress.      Breath sounds: Normal breath sounds.   Abdominal:      Palpations: Abdomen is soft.   Musculoskeletal:      Right lower leg: Edema (trace) present.      Left lower leg: Edema (trace) present.   Skin:     General: Skin is warm and dry.    Neurological:      Mental Status: She is alert. Mental status is at baseline.      Gait: Gait normal.   Psychiatric:         Mood and Affect: Mood normal.         Behavior: Behavior normal.                 Assessment:       Assessment & Plan    1. Suspect nerve pinch causing hand tingling and pain, likely not circulatory issue.  2. Consider carpal tunnel syndrome as potential cause.  3. Plan to investigate source of nerve compression:  4. Evaluate treatment options based on nerve study results:  5. If carpal tunnel: consider orthopedic referral for injection, bracing, or potential surgery.  6. If cervical issue: consider neurosurgery and pain management for surgical release or conservative management.  7. Reassess lung nodules with repeat CT Chest, as previous imaging from October 2024 is overdue for follow-up.  8. Suspect amlodipine as potential cause of ankle swelling.  9. Consider options for managing ankle swelling:  10. Add diuretic.  11. Discontinue amlodipine and temporarily add diuretic.  12. Discontinue amlodipine and monitor BP, adjusting other medications if needed.           Plan:       1. Paresthesia  -     EMG W/ ULTRASOUND AND NERVE CONDUCTION TEST 2 Extremities; Future  -     X-Ray Hand 3 View Bilateral; Future; Expected date: 08/05/2025  -     X-Ray Cervical Spine AP And Lateral; Future; Expected date: 08/05/2025    2. Primary hypertension  Assessment & Plan:  Stable. Continue current medications and regular followup.  Hypertension Medications               amLODIPine (NORVASC) 5 MG tablet TAKE 1 TABLET EVERY DAY    losartan (COZAAR) 50 MG tablet TAKE 1 TABLET EVERY DAY        Has some edema  Consider stopping the norvasc.  Consider adding hctz for fluid      Orders:  -     amLODIPine (NORVASC) 5 MG tablet; Take 1 tablet (5 mg total) by mouth once daily.  Dispense: 90 tablet; Refill: 3  -     losartan (COZAAR) 50 MG tablet; Take 1 tablet (50 mg total) by mouth once daily.  Dispense: 90 tablet;  Refill: 3  -     hydroCHLOROthiazide (MICROZIDE) 12.5 mg capsule; Take 1 capsule (12.5 mg total) by mouth once daily.  Dispense: 30 capsule; Refill: 11  -     CBC Without Differential; Future; Expected date: 08/05/2025  -     Comprehensive Metabolic Panel; Future; Expected date: 08/05/2025    3. Mixed hyperlipidemia  Assessment & Plan:  Stable. Continue current medications and regular followup.  Hyperlipidemia Medications               atorvastatin (LIPITOR) 40 MG tablet TAKE 1 TABLET EVERY DAY              Orders:  -     atorvastatin (LIPITOR) 40 MG tablet; Take 1 tablet (40 mg total) by mouth once daily.  Dispense: 90 tablet; Refill: 3  -     Lipid Panel; Future; Expected date: 08/05/2025    4. Type 2 diabetes mellitus without complication, without long-term current use of insulin  Assessment & Plan:  Lab Results   Component Value Date    HGBA1C 6.2 (H) 10/18/2024     Diabetes Medications              glimepiride (AMARYL) 2 MG tablet Take 1 tablet (2 mg total) by mouth before breakfast.          Stable. Continue current medications and regular followup.  Repeat A1C    Orders:  -     glimepiride (AMARYL) 2 MG tablet; Take 1 tablet (2 mg total) by mouth before breakfast.  Dispense: 90 tablet; Refill: 3  -     Hemoglobin A1C; Future; Expected date: 08/05/2025  -     TSH; Future; Expected date: 08/05/2025    5. Chronic cough  Overview:  CT chest-  Multiple lung nodules, many clustered with a tree in bud configuration, associated with mild bronchiectasis and mucus impacted bronchi.  The overall appearance is most suggestive of an atypical infection, likely PRUDENCIO or other atypical mycobacterial infection.     Additional non clustered nodules scattered throughout the lungs measuring up to 7 mm in size.  These are likely part of the same process but re-evaluation with chest CT is recommended in 6 months.     Nodular skin thickening in the left superior chest wall to be correlated with the physical exam findings.      Atherosclerosis.    Assessment & Plan:  Unchanged and continued  Has been to pulmonary.  She has not been to ENT  Ct chest repeat    Orders:  -     albuterol (PROVENTIL/VENTOLIN HFA) 90 mcg/actuation inhaler; Inhale 2 puffs into the lungs every 6 (six) hours as needed for Wheezing. Rescue  Dispense: 18 g; Refill: 1  -     promethazine-dextromethorphan (PROMETHAZINE-DM) 6.25-15 mg/5 mL Syrp; TAKE 5MLS BY MOUTH EVERY 6 HOURS AS NEEDED FOR COUGH . THANK YOU!  Dispense: 240 mL; Refill: 0    6. Pain in both hands  -     EMG W/ ULTRASOUND AND NERVE CONDUCTION TEST 2 Extremities; Future  -     X-Ray Hand 3 View Bilateral; Future; Expected date: 08/05/2025    7. Screening mammogram for breast cancer  -     Mammo Digital Screening Bilat w/ Lorenzo (XPD); Future; Expected date: 08/05/2025    8. Osteopenia of multiple sites  Overview:  Diagnosis 10/2023 dexa- offered ca/d    Assessment & Plan:  Repeat and follow    Orders:  -     DEXA Bone Density Axial Skeleton 1 or more Site W TBS XPD; Future; Expected date: 08/05/2025  -     Vitamin D; Future; Expected date: 08/05/2025    9. Bronchiectasis without complication  Assessment & Plan:  Continued cough.  Has promethazine cough syrup when it is particularly bad    Orders:  -     albuterol (PROVENTIL/VENTOLIN HFA) 90 mcg/actuation inhaler; Inhale 2 puffs into the lungs every 6 (six) hours as needed for Wheezing. Rescue  Dispense: 18 g; Refill: 1  -     CT Chest Without Contrast; Future; Expected date: 08/05/2025    10. Lung nodule  -     CT Chest Without Contrast; Future; Expected date: 08/05/2025    11. Encounter for hepatitis C screening test for low risk patient  -     Hepatitis C Antibody; Future; Expected date: 08/05/2025         This note was generated with the assistance of ambient listening technology. Verbal consent was obtained by the patient and accompanying visitor(s) for the recording of patient appointment to facilitate this note. I attest to having reviewed and edited  the generated note for accuracy, though some syntax or spelling errors may persist. Please contact the author of this note for any clarification.                    [1]   Patient Active Problem List  Diagnosis    Medial collateral ligament sprain of knee    Pes anserinus bursitis of both knees    Patellofemoral pain syndrome of both knees    Acute medial meniscus tear, left, initial encounter    Baker cyst, right    Acute medial meniscus tear, left, subsequent encounter    Chronic cough    Moderate persistent asthma without complication    Osteoarthritis of multiple joints    Hypertension    Hyperlipidemia    Type 2 diabetes mellitus without complication, without long-term current use of insulin    Osteopenia of multiple sites    Calcaneal spur of right foot    Bronchiectasis without complication    Achilles bursitis of right lower extremity

## 2025-08-06 ENCOUNTER — PATIENT OUTREACH (OUTPATIENT)
Dept: ADMINISTRATIVE | Facility: HOSPITAL | Age: 70
End: 2025-08-06
Payer: COMMERCIAL

## 2025-08-06 NOTE — LETTER
"       AUTHORIZATION FOR RELEASE OF   CONFIDENTIAL INFORMATION    Dear Dr Meredith,    We are seeing Keesha Quezada, date of birth 1955, in the clinic at [facility]. Radha Marquez MD is the patient's PCP. Keesha Quezada has an outstanding lab/procedure at the time we reviewed her chart. In order to help keep her health information updated, she has authorized us to request the following medical record(s):                                                ( X )  EYE EXAM                 Diabetic EYE EXAM           Please include the actual eye exam report along with the significant findings:    ________ No Diabetic Retinopathy  ________ Minimal Background Diabetic Retinopathy  ________ Moderate to Severe Background Diabetic Retinopathy  ________ Clinically Significant Macular Edema  ________ Proliferative Diabetic Retinopathy             Please fax records to [facility], [place], Christie H., MD, [phone].        Thank you  Marilee Hidalgo [facility]  Clinical Care Coordinator  [facility]             Patient Name: Keesha Quezada  : 1955  Patient Phone #: [phone]              A. Consent for Examination and Treatment: I hereby authorize the providers and employees of [facility] to provide medical treatment/services which includes, but is not limited to, performing and administering tests and diagnostic procedures that are deemed necessary, Including, but not limited to, imaging examinations, blood tests and other laboratory procedures as may be required by the hospital, clinic, or may be ordered by my physician(s) or persons working under the general and/or special instructions of my physician(s).                   1.      I understand and agree that this consent covers all authorized persons, including but not limited to physicians, residents, nurse practitioners, physicians' assistants, specialists, consultants, student nurses, and independently contracted " physicians, who are called upon by the physician in charge, to carry out the diagnostic procedures and medical or surgical treatment.  2.      I hereby authorize [facility] to retain or dispose of any specimens or tissue, should there be such remaining from any test or procedure.  3.      I hereby authorize and give consent for [facility] providers and employees to take photographs, images or videotapes of such diagnostic, surgical or treatment procedures of Patient as may be required by [facility] or as may be ordered by a physician.  I further acknowledge and agree that [facility] may use cameras or other devices for patient monitoring.  4.      I am aware that the practice of medicine is not an exact science, and I acknowledge that no guarantees have been made to me as to the outcome of any tests, procedures or treatment.                   B. Authorization for Release of Information:  I understand that my insurance company and/or their agents may need information necessary to make determinations about payment/reimbursement.  I hereby provide authorization to release to all insurance companies, their successors, assignees, other parties with whom they may have contracted, or others acting on their behalf, that are involved with payment for any hospital and/or clinic charges incurred by the patient, any information that they request and deem necessary for payment/reimbursement, and/or quality review.  I further authorize the release of my health information to physicians or other health care practitioners on staff who are involved in my health care now and in the future, and to other health care providers, entities, or institutions for the purpose of my continued care and treatment, including referrals.     C. Medicare Patient's Certification and Authorization to Release Information and Payment Request:  I certify that the information given by me in applying for payment under Title XVIII of the Social Security Act is correct.   I authorize any jacobson of medical or other information about me to release to the Social Security Administration, or its intermediaries or carriers, any information needed for this or a related Medicare claim.  I request that payment of authorized benefits be made on my behalf.     D. Assignment of Insurance Benefits:  I hereby authorize any and all insurance companies, health plans, defined benefit plans, health insurers or any entity that is or may be responsible for payment of my medical expenses to pay all hospital and medical benefits now due, and to become due and payable to me under any hospital benefits, sick benefits, injury benefits or any other benefit for services rendered to me, including Major Medical Benefits, direct to [facility] and all independently contracted physicians.  I assign any and all rights that I may have against any and all insurance companies, health plans, defined benefit plans, health insurers or any entity that is or may be responsible for payment of my medical expenses, including, but not limited to any right to appeal a denial of a claim, any right to bring any action, lawsuit, administrative proceeding, or other cause of action on my behalf.  I specifically assign my right to pursue litigation against any and all insurance companies, health plans, defined benefit plans, health insurers or any entity that is or may be responsible for payment of medical expenses based upon a refusal to pay charges.              REGISTRATION  AUTHORIZATION                    E. Valuables:  It is understood and agreed that [facility] is not liable for the damage to or loss of any money, jewelry, documents, dentures, eye glasses, hearing aids, prosthetics, or other property of value.     F. Computer Equipment:  I understand and agree that should I choose to use computer equipment owned by [facility] or if I choose to access the Internet via [facility]'s network, I do so at my own risk.  [facility] is not responsible  for any damage to my computer equipment or to any damages of any type that might arise from my loss of equipment or data.                   G. Acceptance of Financial Responsibility:  I agree that in consideration of the services and supplies that have been or will be furnished to the patient,  I am hereby obligated to pay all charges made for or on the account of the patient according to the standard rates (in effect at the time the services and supplies are delivered) established by [facility], including its Patient Financial Assistance Policy to the extent it is applicable.  I understand that I am responsible for all charges, or portions thereof, not covered by insurance or other sources.  Patient refunds will be distributed only after balances at all [facility] facilities are paid.                   H. Communication Authorization:  I hereby authorize [facility] and its representatives, along with any billing service or  who may work on their behalf, to contact me on my cell phone and/or home phone using prerecorded messages, artificial voice messages, automatic telephone dialing devices or other computer assisted technology, or by electronic mail, text messaging, or by any other form of electronic communication.  This includes, but is not limited to appointment reminders, yearly physical exam reminders, preventive care reminders, patient campaigns, welcome calls, and calls about account balances on my account or any account on which I am listed as a guarantor.  I understand I have the right to opt out of these communications at any time.     I.  Relationship Between Facility and Physician:  I understand that some, but not all, providers furnishing services to the patient are not employees or agents of [facility].  The patient is under the care and supervision of his/her attending physician, and it is the responsibility of the facility and its nursing staff to carry out the instructions of such physicians.   It is the responsibility of the patient's physician/designee to obtain the patient's informed consent, when required, for medical or surgical treatment, special diagnostic or therapeutic procedures, or hospital services rendered for the patient under the special instructions of the physician/designee.     J. Notice of Private Practices:  I acknowledge I have received a copy of [facility]'s Notice of Privacy Practices.     K. Facility Directory:  I have discussed with the organization my desire to be either included or excluded in the facility directory.  I understand that if my choice is to opt-out of being identified in the facility directory that the facility will not provide any information about me such as my condition (e.g. Fair, stable, etc.) or my location in the facility (e.g. Room number, department).     L. LINKS:  For [place] Residents:  [facility] is a LINKS ([place] [facility]) participating facility.  LINKS is a [facility]-sponsored confidential computer system that helps you and your doctor keep track of you and your child's immunization history.  I acknowledge that I am allowing [facility] to share this information with [facility].  For [place] Residents:  [facility] is a MIIX ([place] Immunization Information eXchange) participant.  MIIX is a [place] Department of Health-sponsored confidential computer system that helps you and your doctor keep track of you and your child's immunization history.  I acknowledge that I am allowing [facility] to share information with [employer].     M. Term:  This authorization is valid for this and subsequent care/treatment I receive at [facility] and will remain valid unless/until revoked in writing by me.      ACKNOWLEDGMENT OF POTENTIAL RISKS OF COVID-19 VACCINE  It is important that you, as the patient or patients legally authorized representative(s), understand and acknowledge the following, with regard to administration of the COVID-19 vaccine offered  by [facility]:  The SARS-CoV-2 virus (COVID-19) has caused an unprecedented modern global pandemic that has mobilized scientists and drug manufacturers to work to create safe and effective vaccines to get the crisis under control.  No vaccine is released in the [place] without undergoing rigorous, multi-layered testing and approval by the Food and Drug Administration.  During a public health emergency, however, vaccines can be released for patient administration by the FDA prior to completion of multi-phase clinical trials and approval. This is done by the FDAs granting of Emergency Use Authorization (EUA) when the vaccine meets reasonable thresholds for safety and effectiveness and people are in urgent need of care. Under an EUA, the FDA has found that known and potential benefits outweigh its known and potential risks.  The vaccine for which you are presenting to [facility] has been released under an EUA, which [facility] is honoring in its distribution of the vaccine to the public. While the FDAs authorization indicates its belief that usage is recommended over possible risks, there is still the possibility that unknown risks of the vaccine could exist.  By signing this document, you acknowledge and assume these risks. Further, you waive any and all claims of liability against and hold harmless any [facility] entity or provider for any harm caused to you by said possible unknown risks of the vaccine.        N. [facility]:  As used in this document, [facility] means all [facility] affiliated entities including all health centers, surgery centers, clinics, and hospitals.  It includes more specifically, the following entities: [facility], a not for profit [place] [employer], and its subsidiaries and affiliates, including [facility], [facility], [facility], [facility]  [facility], [facility], [facility]d/b/a [facility], L.L.C.d/b/a [facility], [employer], [facility]. As manager of [facility], [facility], [facility], L.L.C.d/b/a [facility], [facility] and [facility] as manager of [facility].                                                                Patient/Legal Guardian Signature                                                    This signature was collected at 10/11/2024      /                                                                            Printed Name/Relationship to Patient

## 2025-08-06 NOTE — PROGRESS NOTES
Population Health Chart Review & Patient Outreach Details      Additional [facility] Health Notes:               Updates Requested / Reviewed:      Updated Care Coordination Note, Care Everywhere, , Care Team Updated, and Immunizations Reconciliation Completed or Queried: [place]         Health Maintenance Topics Overdue:      [facility] Score: 3     Eye Exam  Mammogram  Foot Exam    Pneumonia Vaccine  Shingles/Zoster Vaccine  RSV Vaccine                  Health Maintenance Topic(s) Outreach Outcomes & Actions Taken:    Eye Exam - Outreach Outcomes & Actions Taken  : External Records Requested & Care Team Updated if Applicable

## 2025-08-07 ENCOUNTER — HOSPITAL ENCOUNTER (OUTPATIENT)
Dept: RADIOLOGY | Facility: HOSPITAL | Age: 70
Discharge: HOME | End: 2025-08-07
Attending: STUDENT IN AN ORGANIZED HEALTH CARE EDUCATION/TRAINING PROGRAM
Payer: COMMERCIAL

## 2025-08-07 DIAGNOSIS — J47.9 BRONCHIECTASIS WITHOUT COMPLICATION: ICD-10-CM

## 2025-08-07 DIAGNOSIS — M79.641 PAIN IN BOTH HANDS: ICD-10-CM

## 2025-08-07 DIAGNOSIS — M79.642 PAIN IN BOTH HANDS: ICD-10-CM

## 2025-08-07 DIAGNOSIS — R91.1 LUNG NODULE: ICD-10-CM

## 2025-08-07 DIAGNOSIS — R20.2 PARESTHESIA: ICD-10-CM

## 2025-08-07 PROCEDURE — 71250 CT THORAX DX C-: CPT | Mod: 26,,, | Performed by: RADIOLOGY

## 2025-08-07 PROCEDURE — 71250 CT THORAX DX C-: CPT | Mod: TC

## 2025-08-07 PROCEDURE — 72040 X-RAY EXAM NECK SPINE 2-3 VW: CPT | Mod: 26,,, | Performed by: RADIOLOGY

## 2025-08-07 PROCEDURE — 73130 X-RAY EXAM OF HAND: CPT | Mod: TC,50,PN

## 2025-08-07 PROCEDURE — 73130 X-RAY EXAM OF HAND: CPT | Mod: 26,50,, | Performed by: RADIOLOGY

## 2025-08-07 PROCEDURE — 72040 X-RAY EXAM NECK SPINE 2-3 VW: CPT | Mod: TC,PN

## 2025-08-08 ENCOUNTER — RESULTS FOLLOW-UP (OUTPATIENT)
Dept: FAMILY MEDICINE | Facility: CLINIC | Age: 70
End: 2025-08-08
Payer: MEDICARE

## 2025-08-08 DIAGNOSIS — E55.9 VITAMIN D DEFICIENCY: ICD-10-CM

## 2025-08-08 DIAGNOSIS — R05.3 CHRONIC COUGH: ICD-10-CM

## 2025-08-08 DIAGNOSIS — J47.9 BRONCHIECTASIS WITHOUT COMPLICATION: Primary | ICD-10-CM

## 2025-08-08 DIAGNOSIS — R93.89 ABNORMAL CT SCAN: ICD-10-CM

## 2025-08-08 RX ORDER — ERGOCALCIFEROL 1.25 MG/1
50000 CAPSULE ORAL
Qty: 12 CAPSULE | Refills: 0 | Status: SHIPPED | OUTPATIENT
Start: 2025-08-08

## 2025-08-13 ENCOUNTER — PATIENT MESSAGE (OUTPATIENT)
Dept: ADMINISTRATIVE | Facility: HOSPITAL | Age: 70
End: 2025-08-13
Payer: COMMERCIAL

## 2025-08-14 ENCOUNTER — OFFICE VISIT (OUTPATIENT)
Dept: PULMONOLOGY | Facility: CLINIC | Age: 70
End: 2025-08-14
Payer: COMMERCIAL

## 2025-08-14 ENCOUNTER — TELEPHONE (OUTPATIENT)
Dept: PULMONOLOGY | Facility: CLINIC | Age: 70
End: 2025-08-14
Payer: COMMERCIAL

## 2025-08-14 VITALS
WEIGHT: 164 LBS | DIASTOLIC BLOOD PRESSURE: 80 MMHG | BODY MASS INDEX: 27.32 KG/M2 | OXYGEN SATURATION: 98 % | HEIGHT: 65 IN | HEART RATE: 112 BPM | SYSTOLIC BLOOD PRESSURE: 132 MMHG

## 2025-08-14 DIAGNOSIS — J47.9 BRONCHIECTASIS WITHOUT COMPLICATION: ICD-10-CM

## 2025-08-14 DIAGNOSIS — J47.0 BRONCHIECTASIS WITH ACUTE LOWER RESPIRATORY INFECTION: Primary | ICD-10-CM

## 2025-08-14 PROCEDURE — 99999 PR PBB SHADOW E&M-EST. PATIENT-LVL III: CPT | Mod: PBBFAC,,, | Performed by: INTERNAL MEDICINE

## 2025-08-14 PROCEDURE — 99214 OFFICE O/P EST MOD 30 MIN: CPT | Mod: S$PBB,,, | Performed by: INTERNAL MEDICINE

## 2025-08-14 RX ORDER — DOXYCYCLINE HYCLATE 100 MG
100 TABLET ORAL 2 TIMES DAILY
Qty: 20 TABLET | Refills: 0 | Status: SHIPPED | OUTPATIENT
Start: 2025-08-14

## 2025-08-14 RX ORDER — PROMETHAZINE HYDROCHLORIDE AND DEXTROMETHORPHAN HYDROBROMIDE 6.25; 15 MG/5ML; MG/5ML
5 SYRUP ORAL EVERY 6 HOURS PRN
Qty: 473 ML | Refills: 0 | Status: SHIPPED | OUTPATIENT
Start: 2025-08-14 | End: 2025-08-24

## 2025-08-14 RX ORDER — PROMETHAZINE HYDROCHLORIDE AND DEXTROMETHORPHAN HYDROBROMIDE 6.25; 15 MG/5ML; MG/5ML
SYRUP ORAL
Qty: 240 ML | Refills: 0 | Status: SHIPPED | OUTPATIENT
Start: 2025-08-14

## 2025-08-19 ENCOUNTER — PATIENT MESSAGE (OUTPATIENT)
Dept: ADMINISTRATIVE | Facility: HOSPITAL | Age: 70
End: 2025-08-19
Payer: COMMERCIAL

## 2025-08-29 ENCOUNTER — PATIENT OUTREACH (OUTPATIENT)
Dept: ADMINISTRATIVE | Facility: HOSPITAL | Age: 70
End: 2025-08-29
Payer: COMMERCIAL

## (undated) DEVICE — GAUZE SPONGE 4X4 12PLY

## (undated) DEVICE — TUBING ARTHROSCOPY

## (undated) DEVICE — GLOVE SURGEONS ULTRA TOUCH 6.5

## (undated) DEVICE — DRAPE PLASTIC U 60X72

## (undated) DEVICE — SYR 30CC LUER LOCK

## (undated) DEVICE — SHEET DRAPE FAN-FOLDED 3/4

## (undated) DEVICE — NDL ANES SPINAL 18X3.5ST 18G

## (undated) DEVICE — SEE MEDLINE ITEM 157169

## (undated) DEVICE — GOWN B1 X-LG X-LONG

## (undated) DEVICE — SEE MEDLINE ITEM 157216

## (undated) DEVICE — TOURNIQUET SB QC SP 34X4IN

## (undated) DEVICE — ELECTRODE COOLPULSE 90 W/HAND

## (undated) DEVICE — SEE MEDLINE ITEM 156964

## (undated) DEVICE — TUBING SUCTION 3/16X10 2 CONN

## (undated) DEVICE — GLOVE SURG ULTRA TOUCH 8.5

## (undated) DEVICE — NDL ECLIPSE SAFETY 18GX1-1/2IN

## (undated) DEVICE — UNDERGLOVE BIOGEL PI SZ 6.5 LF

## (undated) DEVICE — NDL FILTER 19GA X 1-1/2

## (undated) DEVICE — DRESSING N ADH OIL EMUL 3X3

## (undated) DEVICE — GLOVE SURG ULTRA TOUCH 9

## (undated) DEVICE — GLOVE PI ULTRA TOUCH G SURGEON

## (undated) DEVICE — SOL LAC RINGERS IRR 3000ML

## (undated) DEVICE — SEE MEDLINE ITEM 152530

## (undated) DEVICE — SPONGE/BRUSH SCRUB SURG PCMX

## (undated) DEVICE — SYR 3CC LUER LOC

## (undated) DEVICE — CUTTER MENISCUS AGGRESSIVE 4.0

## (undated) DEVICE — SLEEVE SCD EXPRESS CALF MEDIUM

## (undated) DEVICE — DRAPE STERI U-SHAPED 47X51IN

## (undated) DEVICE — GLOVE SURG ULTRA TOUCH 7.5

## (undated) DEVICE — SEE MEDLINE ITEM 146231